# Patient Record
Sex: FEMALE | Race: BLACK OR AFRICAN AMERICAN | NOT HISPANIC OR LATINO | Employment: OTHER | URBAN - METROPOLITAN AREA
[De-identification: names, ages, dates, MRNs, and addresses within clinical notes are randomized per-mention and may not be internally consistent; named-entity substitution may affect disease eponyms.]

---

## 2019-01-09 ENCOUNTER — HOSPITAL ENCOUNTER (OUTPATIENT)
Dept: FAMILY MEDICINE CLINIC | Facility: CLINIC | Age: 69
Setting detail: SPECIMEN
Discharge: HOME | End: 2019-01-09
Attending: FAMILY MEDICINE | Admitting: FAMILY MEDICINE

## 2019-01-09 LAB
ANION GAP SERPL CALC-SCNC: 15 MMOL/L (ref 10–20)
BUN SERPL-MCNC: 8 MG/DL (ref 8–20)
BUN/CREAT SERPL: 10 (ref 5.4–26.2)
CALCIUM SERPL-MCNC: 9.6 MG/DL (ref 8.9–10.3)
CHLORIDE SERPL-SCNC: 103 MMOL/L (ref 101–111)
CONV CO2: 23 MMOL/L (ref 22–32)
CREAT UR-MCNC: 0.8 MG/DL (ref 0.4–1)
GLUCOSE SERPL-MCNC: 190 MG/DL (ref 65–99)
POTASSIUM SERPL-SCNC: 4 MMOL/L (ref 3.6–5.1)
SODIUM SERPL-SCNC: 137 MMOL/L (ref 136–144)

## 2019-01-15 ENCOUNTER — HOSPITAL ENCOUNTER (OUTPATIENT)
Dept: FAMILY MEDICINE CLINIC | Facility: CLINIC | Age: 69
Setting detail: SPECIMEN
Discharge: HOME | End: 2019-01-15
Attending: FAMILY MEDICINE | Admitting: FAMILY MEDICINE

## 2019-08-13 ENCOUNTER — TELEPHONE (OUTPATIENT)
Dept: FAMILY MEDICINE CLINIC | Facility: CLINIC | Age: 69
End: 2019-08-13

## 2019-08-13 NOTE — TELEPHONE ENCOUNTER
Call from patient's insurance nurse practitioner, patient's A1c is 6.8 from a home visit      Advised patient needs an appointment with Dr. Cisneros to discuss treatment    A1c in January was 6.4

## 2019-09-03 ENCOUNTER — LAB (OUTPATIENT)
Dept: FAMILY MEDICINE CLINIC | Facility: CLINIC | Age: 69
End: 2019-09-03

## 2019-09-03 ENCOUNTER — RESULTS ENCOUNTER (OUTPATIENT)
Dept: FAMILY MEDICINE CLINIC | Facility: CLINIC | Age: 69
End: 2019-09-03

## 2019-09-03 ENCOUNTER — APPOINTMENT (OUTPATIENT)
Dept: MAMMOGRAPHY | Facility: HOSPITAL | Age: 69
End: 2019-09-03

## 2019-09-03 ENCOUNTER — OFFICE VISIT (OUTPATIENT)
Dept: FAMILY MEDICINE CLINIC | Facility: CLINIC | Age: 69
End: 2019-09-03

## 2019-09-03 VITALS
SYSTOLIC BLOOD PRESSURE: 151 MMHG | HEART RATE: 95 BPM | HEIGHT: 63 IN | BODY MASS INDEX: 51.91 KG/M2 | OXYGEN SATURATION: 99 % | DIASTOLIC BLOOD PRESSURE: 91 MMHG | WEIGHT: 293 LBS

## 2019-09-03 DIAGNOSIS — Z12.11 SCREENING FOR COLON CANCER: ICD-10-CM

## 2019-09-03 DIAGNOSIS — R21 RASH: ICD-10-CM

## 2019-09-03 DIAGNOSIS — I10 ESSENTIAL HYPERTENSION: ICD-10-CM

## 2019-09-03 DIAGNOSIS — Z12.31 BREAST CANCER SCREENING BY MAMMOGRAM: Primary | ICD-10-CM

## 2019-09-03 DIAGNOSIS — F41.9 ANXIETY: ICD-10-CM

## 2019-09-03 DIAGNOSIS — E11.9 TYPE 2 DIABETES MELLITUS WITHOUT COMPLICATION, WITHOUT LONG-TERM CURRENT USE OF INSULIN (HCC): ICD-10-CM

## 2019-09-03 DIAGNOSIS — M25.562 ACUTE PAIN OF LEFT KNEE: ICD-10-CM

## 2019-09-03 PROBLEM — R73.02 IMPAIRED GLUCOSE TOLERANCE: Status: ACTIVE | Noted: 2019-01-16

## 2019-09-03 PROBLEM — M10.9 GOUT: Status: ACTIVE | Noted: 2019-09-03

## 2019-09-03 PROBLEM — R73.9 HYPERGLYCEMIA: Status: ACTIVE | Noted: 2019-01-12

## 2019-09-03 PROBLEM — M19.90 OSTEOARTHRITIS: Status: ACTIVE | Noted: 2019-01-09

## 2019-09-03 LAB
ALBUMIN SERPL-MCNC: 4 G/DL (ref 3.5–4.8)
ALBUMIN UR-MCNC: 19 MG/L
ALBUMIN/GLOB SERPL: 1.3 G/DL (ref 1–1.7)
ALP SERPL-CCNC: 101 U/L (ref 32–91)
ALT SERPL W P-5'-P-CCNC: 17 U/L (ref 14–54)
ANION GAP SERPL CALCULATED.3IONS-SCNC: 15.5 MMOL/L (ref 5–15)
ARTICHOKE IGE QN: 86 MG/DL (ref 0–100)
AST SERPL-CCNC: 19 U/L (ref 15–41)
BILIRUB SERPL-MCNC: 0.4 MG/DL (ref 0.3–1.2)
BUN BLD-MCNC: 8 MG/DL (ref 8–20)
BUN/CREAT SERPL: 8.9 (ref 5.4–26.2)
CALCIUM SPEC-SCNC: 9.7 MG/DL (ref 8.9–10.3)
CHLORIDE SERPL-SCNC: 104 MMOL/L (ref 101–111)
CHOLEST SERPL-MCNC: 148 MG/DL
CO2 SERPL-SCNC: 24 MMOL/L (ref 22–32)
CREAT BLD-MCNC: 0.9 MG/DL (ref 0.4–1)
GFR SERPL CREATININE-BSD FRML MDRD: 75 ML/MIN/1.73
GLOBULIN UR ELPH-MCNC: 3.1 GM/DL (ref 2.5–3.8)
GLUCOSE BLD-MCNC: 129 MG/DL (ref 65–99)
HDLC SERPL QL: 3.79
HDLC SERPL-MCNC: 39 MG/DL
LDLC/HDLC SERPL: 2.07 {RATIO}
POTASSIUM BLD-SCNC: 4.5 MMOL/L (ref 3.6–5.1)
PROT SERPL-MCNC: 7.1 G/DL (ref 6.1–7.9)
SODIUM BLD-SCNC: 139 MMOL/L (ref 136–144)
TRIGL SERPL-MCNC: 141 MG/DL
VLDLC SERPL-MCNC: 28.2 MG/DL

## 2019-09-03 PROCEDURE — 99214 OFFICE O/P EST MOD 30 MIN: CPT | Performed by: FAMILY MEDICINE

## 2019-09-03 PROCEDURE — 36415 COLL VENOUS BLD VENIPUNCTURE: CPT | Performed by: FAMILY MEDICINE

## 2019-09-03 PROCEDURE — 82043 UR ALBUMIN QUANTITATIVE: CPT | Performed by: FAMILY MEDICINE

## 2019-09-03 PROCEDURE — 80053 COMPREHEN METABOLIC PANEL: CPT | Performed by: FAMILY MEDICINE

## 2019-09-03 PROCEDURE — 80061 LIPID PANEL: CPT | Performed by: FAMILY MEDICINE

## 2019-09-03 RX ORDER — INDOMETHACIN 50 MG/1
1 CAPSULE ORAL EVERY 8 HOURS
COMMUNITY
Start: 2017-10-03 | End: 2019-09-26 | Stop reason: SDUPTHER

## 2019-09-03 RX ORDER — HYDROXYZINE HYDROCHLORIDE 25 MG/1
TABLET, FILM COATED ORAL EVERY 6 HOURS
COMMUNITY
Start: 2017-05-17 | End: 2019-09-03 | Stop reason: SDUPTHER

## 2019-09-03 RX ORDER — ALLOPURINOL 300 MG/1
TABLET ORAL EVERY 24 HOURS
COMMUNITY
Start: 2017-12-02 | End: 2019-09-26 | Stop reason: SDUPTHER

## 2019-09-03 RX ORDER — LISINOPRIL 10 MG/1
10 TABLET ORAL DAILY
Qty: 90 TABLET | Refills: 3 | Status: SHIPPED | OUTPATIENT
Start: 2019-09-03 | End: 2020-07-28

## 2019-09-03 RX ORDER — HYDROXYZINE HYDROCHLORIDE 25 MG/1
25 TABLET, FILM COATED ORAL EVERY 6 HOURS PRN
Start: 2019-09-03 | End: 2020-04-02

## 2019-09-03 RX ORDER — BUPROPION HYDROCHLORIDE 150 MG/1
150 TABLET, EXTENDED RELEASE ORAL 2 TIMES DAILY
Qty: 180 TABLET | Refills: 3 | Status: SHIPPED | OUTPATIENT
Start: 2019-09-03 | End: 2020-12-30

## 2019-09-03 NOTE — PROGRESS NOTES
Subjective   Jeanna Hudson is a 69 y.o. female.     Comes in today for follow-up after she says she had a Medicare wellness exam done at her home and some abnormalities were found  A1C went form 6.4 to 6.8  Left knee pain for a month  Makes it diff to walk  No trauma  usu wears glasses  Has not seen eye doctor in years  Rash - pruritic  No h/o seizures  Cannot take flu vaccine  Still smoking but wants to stop  anxious         The following portions of the patient's history were reviewed and updated as appropriate: allergies, current medications, past family history, past medical history, past social history, past surgical history and problem list.  Past Medical History:   Diagnosis Date   • Gout    • Sciatica      History reviewed. No pertinent surgical history.  History reviewed. No pertinent family history.  Social History     Socioeconomic History   • Marital status:      Spouse name: Not on file   • Number of children: Not on file   • Years of education: Not on file   • Highest education level: Not on file   Tobacco Use   • Smoking status: Current Every Day Smoker   • Smokeless tobacco: Never Used   Substance and Sexual Activity   • Alcohol use: No     Frequency: Never   • Drug use: No   • Sexual activity: Defer         Current Outpatient Medications:   •  allopurinol (ZYLOPRIM) 300 MG tablet, Daily., Disp: , Rfl:   •  fluocinonide (LIDEX) 0.05 % cream, Apply  topically to the appropriate area as directed Every 12 (Twelve) Hours., Disp: 15 g, Rfl: 1  •  hydrOXYzine (ATARAX) 25 MG tablet, Take 1 tablet by mouth Every 6 (Six) Hours As Needed for Itching., Disp: , Rfl:   •  indomethacin (INDOCIN) 50 MG capsule, 1 capsule Every 8 (Eight) Hours., Disp: , Rfl:   •  buPROPion SR (WELLBUTRIN SR) 150 MG 12 hr tablet, Take 1 tablet by mouth 2 (Two) Times a Day. Only take the pill once a day for the first 3 days.  Always take the 2nd dose by 5pm, Disp: 180 tablet, Rfl: 3  •  lisinopril (PRINIVIL,ZESTRIL) 10 MG  "tablet, Take 1 tablet by mouth Daily., Disp: 90 tablet, Rfl: 3  •  metFORMIN (GLUCOPHAGE) 500 MG tablet, Take 1 tablet by mouth 2 (Two) Times a Day With Meals., Disp: 180 tablet, Rfl: 3    Review of Systems   Constitutional: Negative for diaphoresis, fatigue, fever, unexpected weight gain and unexpected weight loss.   Respiratory: Negative for cough, chest tightness and shortness of breath.    Cardiovascular: Negative for chest pain, palpitations and leg swelling.   Gastrointestinal: Negative for nausea and vomiting.   Endocrine: Positive for polydipsia, polyphagia and polyuria. Negative for cold intolerance and heat intolerance.   Musculoskeletal: Positive for arthralgias. Negative for joint swelling.   Skin: Positive for rash.   Neurological: Positive for numbness. Negative for dizziness, syncope and headache.     /91 (BP Location: Left arm, Cuff Size: Large Adult)   Pulse 95   Ht 158.8 cm (62.5\")   Wt (!) 137 kg (301 lb 11.2 oz)   SpO2 99%   BMI 54.30 kg/m²       Objective   Physical Exam   Constitutional: She appears well-developed and well-nourished. No distress. She is obese.  HENT:   Head: Normocephalic and atraumatic.   Neck: Neck supple. No JVD present. No thyromegaly present.   Cardiovascular: Normal rate, regular rhythm, normal heart sounds and intact distal pulses. Exam reveals no gallop and no friction rub.   No murmur heard.  Pulmonary/Chest: Effort normal and breath sounds normal. No respiratory distress. She has no wheezes. She has no rales.   Musculoskeletal: She exhibits no edema.        Left knee: She exhibits decreased range of motion. She exhibits no swelling, no effusion, no ecchymosis, no deformity, no laceration and no erythema. Tenderness found.   Left knee: no laxity  Neg drawer sign     Lymphadenopathy:     She has no cervical adenopathy.   Neurological: She is alert.   Skin: Skin is warm and dry. Rash (few isolated papules on both forearms ) noted.   Psychiatric: Her mood " appears anxious.   Nursing note and vitals reviewed.        Assessment/Plan   Problems Addressed this Visit        Cardiovascular and Mediastinum    Hypertension    Relevant Medications    lisinopril (PRINIVIL,ZESTRIL) 10 MG tablet    Other Relevant Orders    Comprehensive Metabolic Panel (Completed)    Lipid Panel (Completed)       Endocrine    Type 2 diabetes mellitus without complication, without long-term current use of insulin (CMS/[facility])    Relevant Medications    metFORMIN (GLUCOPHAGE) 500 MG tablet    Other Relevant Orders    Comprehensive Metabolic Panel (Completed)    Lipid Panel (Completed)    MicroAlbumin, Urine, Random - Urine, Clean Catch (Completed)    Ambulatory Referral to Diabetic Education       Other    Breast cancer screening by mammogram - Primary    Relevant Orders    Mammo Screening Digital Tomosynthesis Bilateral With CAD    Screening for colon cancer    Relevant Orders    Cologuard - Stool, Per Rectum      Other Visit Diagnoses     Acute pain of left knee        send for x-ray    Relevant Orders    XR Knee 3 View Left    Rash        reassured    Relevant Medications    fluocinonide (LIDEX) 0.05 % cream    Anxiety        will start wellbutrin (generic)  this may also help her stop smoking

## 2019-09-03 NOTE — PATIENT INSTRUCTIONS
Stop smoking  Keep working to lose weight through healthy eating and exercise.  No fried foods and limit pasta, bread, and sweets.  See your eye doctor  Test your blood sugar once a day before a meal  Ok to take 2 ES tylenol 3 times a day if needed for pain

## 2019-09-10 ENCOUNTER — APPOINTMENT (OUTPATIENT)
Dept: MAMMOGRAPHY | Facility: HOSPITAL | Age: 69
End: 2019-09-10

## 2019-09-16 ENCOUNTER — TELEPHONE (OUTPATIENT)
Dept: ENDOCRINOLOGY | Facility: CLINIC | Age: 69
End: 2019-09-16

## 2019-09-16 NOTE — TELEPHONE ENCOUNTER
CALLED PATIENT TO GIVE AN APPOINTMENT REMINDER CALL FOR TOMORROW 09/17 @ NOON. NOT ABLE TO LEAVE HER ANY KIND OF A MESSAGE

## 2019-09-17 ENCOUNTER — HOSPITAL ENCOUNTER (OUTPATIENT)
Dept: GENERAL RADIOLOGY | Facility: HOSPITAL | Age: 69
Discharge: HOME | End: 2019-09-17

## 2019-09-17 ENCOUNTER — HOSPITAL ENCOUNTER (OUTPATIENT)
Dept: MAMMOGRAPHY | Facility: HOSPITAL | Age: 69
Discharge: HOME | End: 2019-09-17
Admitting: FAMILY MEDICINE

## 2019-09-17 DIAGNOSIS — M25.562 ACUTE PAIN OF LEFT KNEE: ICD-10-CM

## 2019-09-17 DIAGNOSIS — Z12.31 BREAST CANCER SCREENING BY MAMMOGRAM: ICD-10-CM

## 2019-09-17 PROCEDURE — 77063 BREAST TOMOSYNTHESIS BI: CPT

## 2019-09-17 PROCEDURE — 73562 X-RAY EXAM OF KNEE 3: CPT

## 2019-09-17 PROCEDURE — 77067 SCR MAMMO BI INCL CAD: CPT

## 2019-09-19 ENCOUNTER — TELEPHONE (OUTPATIENT)
Dept: FAMILY MEDICINE CLINIC | Facility: CLINIC | Age: 69
End: 2019-09-19

## 2019-09-19 DIAGNOSIS — M25.562 ACUTE PAIN OF LEFT KNEE: Primary | ICD-10-CM

## 2019-09-19 NOTE — TELEPHONE ENCOUNTER
PT CALLED BACK. SHE WOULD LIKE THE ORTHO REFERRAL PLEASE. ALSO, SHE IS IN TERRIBLE PAIN AND CAN'T WALK. SHE WANTS TO KNOW IF YOU CAN CALL HER IN SOMETHING.

## 2019-09-23 ENCOUNTER — TELEPHONE (OUTPATIENT)
Dept: ENDOCRINOLOGY | Facility: CLINIC | Age: 69
End: 2019-09-23

## 2019-09-24 ENCOUNTER — OFFICE VISIT (OUTPATIENT)
Dept: ENDOCRINOLOGY | Facility: CLINIC | Age: 69
End: 2019-09-24

## 2019-09-24 DIAGNOSIS — E11.9 TYPE 2 DIABETES MELLITUS WITHOUT COMPLICATION, WITHOUT LONG-TERM CURRENT USE OF INSULIN (HCC): ICD-10-CM

## 2019-09-24 PROCEDURE — G0108 DIAB MANAGE TRN  PER INDIV: HCPCS | Performed by: DIETITIAN, REGISTERED

## 2019-09-24 RX ORDER — INDOMETHACIN 50 MG/1
CAPSULE ORAL
Qty: 270 CAPSULE | OUTPATIENT
Start: 2019-09-24

## 2019-09-24 RX ORDER — ALLOPURINOL 300 MG/1
TABLET ORAL
Qty: 90 TABLET | OUTPATIENT
Start: 2019-09-24

## 2019-09-26 RX ORDER — INDOMETHACIN 50 MG/1
50 CAPSULE ORAL EVERY 8 HOURS
Qty: 270 CAPSULE | Refills: 0 | Status: SHIPPED | OUTPATIENT
Start: 2019-09-26 | End: 2020-11-27

## 2019-09-26 RX ORDER — ALLOPURINOL 300 MG/1
300 TABLET ORAL DAILY
Qty: 90 TABLET | Refills: 0 | Status: SHIPPED | OUTPATIENT
Start: 2019-09-26 | End: 2020-01-26

## 2019-10-10 ENCOUNTER — OFFICE VISIT (OUTPATIENT)
Dept: ORTHOPEDIC SURGERY | Facility: CLINIC | Age: 69
End: 2019-10-10

## 2019-10-10 VITALS
HEIGHT: 55 IN | BODY MASS INDEX: 67.81 KG/M2 | WEIGHT: 293 LBS | HEART RATE: 125 BPM | DIASTOLIC BLOOD PRESSURE: 79 MMHG | SYSTOLIC BLOOD PRESSURE: 149 MMHG

## 2019-10-10 DIAGNOSIS — M17.12 PRIMARY LOCALIZED OSTEOARTHROSIS OF THE KNEE, LEFT: Primary | ICD-10-CM

## 2019-10-10 PROCEDURE — 99203 OFFICE O/P NEW LOW 30 MIN: CPT | Performed by: ORTHOPAEDIC SURGERY

## 2019-10-10 PROCEDURE — 20610 DRAIN/INJ JOINT/BURSA W/O US: CPT | Performed by: ORTHOPAEDIC SURGERY

## 2019-10-10 RX ORDER — MELOXICAM 7.5 MG/1
7.5 TABLET ORAL DAILY PRN
Qty: 30 TABLET | Refills: 4 | Status: SHIPPED | OUTPATIENT
Start: 2019-10-10 | End: 2019-11-01

## 2019-10-10 RX ORDER — TRIAMCINOLONE ACETONIDE 40 MG/ML
80 INJECTION, SUSPENSION INTRA-ARTICULAR; INTRAMUSCULAR ONCE
Status: COMPLETED | OUTPATIENT
Start: 2019-10-10 | End: 2019-10-10

## 2019-10-10 RX ADMIN — TRIAMCINOLONE ACETONIDE 80 MG: 40 INJECTION, SUSPENSION INTRA-ARTICULAR; INTRAMUSCULAR at 13:35

## 2019-10-10 NOTE — PROGRESS NOTES
"     Patient ID: Jeanna Hudson is a 69 y.o. female.    Chief Complaint:    Chief Complaint   Patient presents with   • Consult     left knee pain       HPI:  Jeanna is a 69-year-old female here with several months of left knee pain.  There is no injury.  It is worse when walking.  It pops with bending.  Pain is sharp and a 6/10.  There is been no treatment to date.  Past Medical History:   Diagnosis Date   • Gout    • Sciatica        History reviewed. No pertinent surgical history.    History reviewed. No pertinent family history.       Social History     Occupational History   • Not on file   Tobacco Use   • Smoking status: Current Every Day Smoker   • Smokeless tobacco: Never Used   Substance and Sexual Activity   • Alcohol use: No     Frequency: Never   • Drug use: No   • Sexual activity: Defer      Review of Systems   Cardiovascular: Negative for chest pain.   Musculoskeletal: Positive for arthralgias.       Objective:    /79   Pulse (!) 125   Ht 62.5 cm (24.61\")   Wt 135 kg (297 lb)   .89 kg/m²     Physical Examination:  She is a pleasant female in no distress. She is alert and oriented x3 and appears her stated age.  Left knee demonstrates no scars and no atrophy with mild effusion with medial and lateral joint line tenderness.  Range of motion is 0 to 110 degrees without instability.Sensory and motor exam are intact all distributions. Dorsalis pedis and posterior tibialis pulses are palpable and capillary refill is less than two seconds to all digits    Imaging:  Moderate to severe degenerative joint disease    Assessment:  Left knee degenerative joint disease    Plan: Options discussed.  I wrote for meloxicam.I recommend injection after today's eval.  Risks and benefits were discussed. Under sterile technique and written consent I injected 80mg of Kenalog and 2cc of 1% Lidocaine plain into the knee. It was well tolerated. Postinjection instructions were given.  "

## 2019-11-01 RX ORDER — MELOXICAM 7.5 MG/1
7.5 TABLET ORAL DAILY PRN
Qty: 90 TABLET | Refills: 4 | Status: SHIPPED | OUTPATIENT
Start: 2019-11-01 | End: 2021-01-01 | Stop reason: HOSPADM

## 2019-12-13 ENCOUNTER — TELEPHONE (OUTPATIENT)
Dept: ORTHOPEDIC SURGERY | Facility: CLINIC | Age: 69
End: 2019-12-13

## 2019-12-16 ENCOUNTER — TELEPHONE (OUTPATIENT)
Dept: ORTHOPEDIC SURGERY | Facility: CLINIC | Age: 69
End: 2019-12-16

## 2019-12-16 NOTE — TELEPHONE ENCOUNTER
She will not be able to get visco inj until the beginning of the year, she is having quite a bit of pain, can we do a compound cream to help get her by until she can get the injections?

## 2019-12-16 NOTE — TELEPHONE ENCOUNTER
Called patient went over SOB wants, to proceed with injs. Will order orthovisc. Transferred to make an appointment.

## 2020-01-03 ENCOUNTER — TELEPHONE (OUTPATIENT)
Dept: ORTHOPEDIC SURGERY | Facility: CLINIC | Age: 70
End: 2020-01-03

## 2020-01-10 NOTE — TELEPHONE ENCOUNTER
orthovisc  Was denied, it is not a preferred product by [facility], submitting for euflexxa.

## 2020-01-12 RX ORDER — INDOMETHACIN 50 MG/1
CAPSULE ORAL
Qty: 270 CAPSULE | Refills: 0 | OUTPATIENT
Start: 2020-01-12

## 2020-01-14 ENCOUNTER — TELEPHONE (OUTPATIENT)
Dept: ORTHOPEDIC SURGERY | Facility: CLINIC | Age: 70
End: 2020-01-14

## 2020-01-14 NOTE — TELEPHONE ENCOUNTER
No having any luck with getting visco covered through her insurance, she states that the regular steroid inj gave her about 2 months of relief, and is really wanting some type of injection.  will run for zilretta to see if insurance will cover that. Her last steroid inj, was 10/10/19.

## 2020-01-21 NOTE — TELEPHONE ENCOUNTER
Zilretta covered at 80%, no PA required. Will have office co pay.    Spoke with patient an she would like to proceed. Was transferred up front to make an appointment.

## 2020-01-26 RX ORDER — ALLOPURINOL 300 MG/1
300 TABLET ORAL DAILY
Qty: 90 TABLET | Refills: 1 | Status: SHIPPED | OUTPATIENT
Start: 2020-01-26 | End: 2020-07-28

## 2020-02-06 ENCOUNTER — OFFICE VISIT (OUTPATIENT)
Dept: ORTHOPEDIC SURGERY | Facility: CLINIC | Age: 70
End: 2020-02-06

## 2020-02-06 VITALS
BODY MASS INDEX: 50.02 KG/M2 | HEIGHT: 64 IN | HEART RATE: 118 BPM | SYSTOLIC BLOOD PRESSURE: 158 MMHG | WEIGHT: 293 LBS | DIASTOLIC BLOOD PRESSURE: 77 MMHG

## 2020-02-06 DIAGNOSIS — M17.12 PRIMARY OSTEOARTHRITIS OF LEFT KNEE: Primary | ICD-10-CM

## 2020-02-06 PROCEDURE — 20610 DRAIN/INJ JOINT/BURSA W/O US: CPT | Performed by: ORTHOPAEDIC SURGERY

## 2020-02-06 NOTE — PROGRESS NOTES
"     Patient ID: Jeanan Hudson is a 69 y.o. female.    Left knee pain  Here for Zilretta    Objective:    /77   Pulse 118   Ht 161.3 cm (63.5\")   Wt 135 kg (297 lb)   BMI 51.79 kg/m²     Physical Examination:    Left knee demonstrates no redness mild effusion    Imaging:      Assessment:  Left knee degenerative joint disease    Plan:  Large Joint Arthrocentesis: L knee  Date/Time: 2/6/2020 1:19 PM  Procedure Details  Location: knee - L knee  Needle size: 22 G  Medications administered: 32 mg Triamcinolone Acetonide 32 MG  Patient tolerance: patient tolerated the procedure well with no immediate complications        "

## 2020-04-02 ENCOUNTER — OFFICE VISIT (OUTPATIENT)
Dept: FAMILY MEDICINE CLINIC | Facility: CLINIC | Age: 70
End: 2020-04-02

## 2020-04-02 VITALS
OXYGEN SATURATION: 99 % | TEMPERATURE: 97.5 F | DIASTOLIC BLOOD PRESSURE: 88 MMHG | BODY MASS INDEX: 50.02 KG/M2 | HEART RATE: 99 BPM | WEIGHT: 293 LBS | SYSTOLIC BLOOD PRESSURE: 159 MMHG | HEIGHT: 64 IN

## 2020-04-02 DIAGNOSIS — I10 ESSENTIAL HYPERTENSION: Primary | ICD-10-CM

## 2020-04-02 DIAGNOSIS — E11.9 TYPE 2 DIABETES MELLITUS WITHOUT COMPLICATION, WITHOUT LONG-TERM CURRENT USE OF INSULIN (HCC): ICD-10-CM

## 2020-04-02 DIAGNOSIS — F41.1 GENERALIZED ANXIETY DISORDER: ICD-10-CM

## 2020-04-02 LAB
ALBUMIN SERPL-MCNC: 4.3 G/DL (ref 3.5–5.2)
ALBUMIN/GLOB SERPL: 1.2 G/DL
ALP SERPL-CCNC: 103 U/L (ref 39–117)
ALT SERPL W P-5'-P-CCNC: 11 U/L (ref 1–33)
ANION GAP SERPL CALCULATED.3IONS-SCNC: 14 MMOL/L (ref 5–15)
AST SERPL-CCNC: 13 U/L (ref 1–32)
BILIRUB SERPL-MCNC: 0.2 MG/DL (ref 0.2–1.2)
BUN BLD-MCNC: 10 MG/DL (ref 8–23)
BUN/CREAT SERPL: 11.8 (ref 7–25)
CALCIUM SPEC-SCNC: 9.8 MG/DL (ref 8.6–10.5)
CHLORIDE SERPL-SCNC: 101 MMOL/L (ref 98–107)
CO2 SERPL-SCNC: 23 MMOL/L (ref 22–29)
CREAT BLD-MCNC: 0.85 MG/DL (ref 0.57–1)
GFR SERPL CREATININE-BSD FRML MDRD: 81 ML/MIN/1.73
GLOBULIN UR ELPH-MCNC: 3.7 GM/DL
GLUCOSE BLD-MCNC: 132 MG/DL (ref 65–99)
POTASSIUM BLD-SCNC: 4.5 MMOL/L (ref 3.5–5.2)
PROT SERPL-MCNC: 8 G/DL (ref 6–8.5)
SODIUM BLD-SCNC: 138 MMOL/L (ref 136–145)

## 2020-04-02 PROCEDURE — 36415 COLL VENOUS BLD VENIPUNCTURE: CPT | Performed by: FAMILY MEDICINE

## 2020-04-02 PROCEDURE — 83036 HEMOGLOBIN GLYCOSYLATED A1C: CPT | Performed by: FAMILY MEDICINE

## 2020-04-02 PROCEDURE — 80053 COMPREHEN METABOLIC PANEL: CPT | Performed by: FAMILY MEDICINE

## 2020-04-02 PROCEDURE — 99213 OFFICE O/P EST LOW 20 MIN: CPT | Performed by: FAMILY MEDICINE

## 2020-04-02 NOTE — PATIENT INSTRUCTIONS
Keep working to lose weight through healthy eating and exercise.  See your eye doctor  No fried foods and limit pasta, bread, and sweets.

## 2020-04-02 NOTE — PROGRESS NOTES
Eleno Hudson is a 69 y.o. female.     Here for follow-up on blood pressure and diabetes and anxiety  Does not check BS   Needs eye appt  Feels well  Has cologuard kit at home but has not done it       The following portions of the patient's history were reviewed and updated as appropriate: allergies, current medications, past family history, past medical history, past social history, past surgical history and problem list.  Past Medical History:   Diagnosis Date   • Gout    • Sciatica      History reviewed. No pertinent surgical history.  History reviewed. No pertinent family history.  Social History     Socioeconomic History   • Marital status:      Spouse name: Not on file   • Number of children: Not on file   • Years of education: Not on file   • Highest education level: Not on file   Tobacco Use   • Smoking status: Current Every Day Smoker     Packs/day: 0.25     Types: Cigarettes   • Smokeless tobacco: Never Used   • Tobacco comment: cigs/day trying to quit   Substance and Sexual Activity   • Alcohol use: No     Frequency: Never   • Drug use: No   • Sexual activity: Defer         Current Outpatient Medications:   •  allopurinol (ZYLOPRIM) 300 MG tablet, TAKE 1 TABLET BY MOUTH  DAILY, Disp: 90 tablet, Rfl: 1  •  buPROPion SR (WELLBUTRIN SR) 150 MG 12 hr tablet, Take 1 tablet by mouth 2 (Two) Times a Day. Only take the pill once a day for the first 3 days.  Always take the 2nd dose by 5pm, Disp: 180 tablet, Rfl: 3  •  fluocinonide (LIDEX) 0.05 % cream, APPLY TOPICALLY TO THE  APPROPRIATE AREA AS  DIRECTED EVERY 12 HOURS., Disp: 30 g, Rfl: 0  •  indomethacin (INDOCIN) 50 MG capsule, Take 1 capsule by mouth Every 8 (Eight) Hours., Disp: 270 capsule, Rfl: 0  •  lisinopril (PRINIVIL,ZESTRIL) 10 MG tablet, Take 1 tablet by mouth Daily., Disp: 90 tablet, Rfl: 3  •  metFORMIN (GLUCOPHAGE) 500 MG tablet, Take 1 tablet by mouth 2 (Two) Times a Day With Meals., Disp: 180 tablet, Rfl: 3  •   "meloxicam (MOBIC) 7.5 MG tablet, Take 1 tablet by mouth Daily As Needed for Moderate Pain . 90 day supply, Disp: 90 tablet, Rfl: 4    Review of Systems   Constitutional: Negative for diaphoresis, fatigue, fever, unexpected weight gain and unexpected weight loss.   Respiratory: Negative for cough, chest tightness and shortness of breath.    Cardiovascular: Negative for chest pain, palpitations and leg swelling.   Gastrointestinal: Negative for nausea and vomiting.   Endocrine: Negative.    Neurological: Positive for numbness. Negative for dizziness, syncope and headache.     /88 (BP Location: Left arm, Patient Position: Sitting, Cuff Size: Large Adult)   Pulse 99   Temp 97.5 °F (36.4 °C) (Oral)   Ht 161.3 cm (63.5\")   Wt 135 kg (297 lb)   SpO2 99%   Breastfeeding No   BMI 51.79 kg/m²       Objective   Physical Exam   Constitutional: She appears well-developed and well-nourished. No distress.   HENT:   Head: Normocephalic and atraumatic.   Neck: Neck supple. No JVD present. No thyromegaly present.   Cardiovascular: Normal rate, regular rhythm, normal heart sounds and intact distal pulses. Exam reveals no gallop and no friction rub.   No murmur heard.  Pulmonary/Chest: Effort normal and breath sounds normal. No respiratory distress. She has no wheezes. She has no rales.   Musculoskeletal: She exhibits no edema.    [place] had a diabetic foot exam performed today.   During the foot exam she had a monofilament test not performed.  Vascular Status -  Her right foot exhibits normal foot vasculature  and no edema. Her left foot exhibits normal foot vasculature  and no edema.  Skin Integrity  -  Her right foot skin is intact. She has right foot onychomycosis and callous right foot.  She has no right foot ulcer, no ingrown toenail on right foot, right heel is not dry and cracked, no right foot warmth, no right foot blister and no right foot gangrenous changes.Her left foot skin is intact.She has left foot " onychomycosis and callous left foot. She has no left foot ulcer, no left ingrown toenail, no left heel dry and cracked, no left foot warmth, no left foot blister and no left foot gangrenous changes..   Foot Structure and Biomechanics -  Her right foot has no hallux valgus, no pes cavus, no claw toes and no hammer toes present. Her left foot has no hallux valgus, no pes cavus, no claw toes and no hammer toes.  Lymphadenopathy:     She has no cervical adenopathy.   Neurological: She is alert.   Skin: Skin is warm and dry.   Psychiatric: She has a normal mood and affect.   Nursing note and vitals reviewed.        Assessment/Plan   Problems Addressed this Visit        Cardiovascular and Mediastinum    Hypertension - Primary    Relevant Orders    Comprehensive Metabolic Panel       Endocrine    Type 2 diabetes mellitus without complication, without long-term current use of insulin (CMS/[facility])    Relevant Orders    Comprehensive Metabolic Panel    Hemoglobin A1c       Other    Generalized anxiety disorder        She was counseled on need for improved diet, exercise, weight loss and routine eye appt  She will continue current meds  Will see her back in 6 mo for follow up and medicare wellness  Anxiety is staying well controlled

## 2020-04-03 LAB — HBA1C MFR BLD: 5.5 % (ref 3.5–5.6)

## 2020-05-26 ENCOUNTER — TELEPHONE (OUTPATIENT)
Dept: FAMILY MEDICINE CLINIC | Facility: CLINIC | Age: 70
End: 2020-05-26

## 2020-07-28 RX ORDER — LISINOPRIL 10 MG/1
10 TABLET ORAL DAILY
Qty: 90 TABLET | Refills: 2 | Status: SHIPPED | OUTPATIENT
Start: 2020-07-28 | End: 2020-12-15

## 2020-07-28 RX ORDER — ALLOPURINOL 300 MG/1
300 TABLET ORAL DAILY
Qty: 90 TABLET | Refills: 2 | Status: SHIPPED | OUTPATIENT
Start: 2020-07-28 | End: 2021-02-19

## 2020-10-02 ENCOUNTER — TELEPHONE (OUTPATIENT)
Dept: FAMILY MEDICINE CLINIC | Facility: CLINIC | Age: 70
End: 2020-10-02

## 2020-10-02 NOTE — TELEPHONE ENCOUNTER
No - it is a virus and requires symptomatic care: tylenol, advil, mucinex dm  If she is having trouble breathing, she needs to be seen at the [facility] or the ER

## 2020-10-19 ENCOUNTER — OFFICE VISIT (OUTPATIENT)
Dept: FAMILY MEDICINE CLINIC | Facility: CLINIC | Age: 70
End: 2020-10-19

## 2020-10-19 VITALS
HEIGHT: 64 IN | DIASTOLIC BLOOD PRESSURE: 76 MMHG | HEART RATE: 116 BPM | TEMPERATURE: 96.9 F | BODY MASS INDEX: 49.68 KG/M2 | SYSTOLIC BLOOD PRESSURE: 124 MMHG | OXYGEN SATURATION: 100 % | WEIGHT: 291 LBS

## 2020-10-19 DIAGNOSIS — E11.9 TYPE 2 DIABETES MELLITUS WITHOUT COMPLICATION, WITHOUT LONG-TERM CURRENT USE OF INSULIN (HCC): ICD-10-CM

## 2020-10-19 DIAGNOSIS — Z11.59 NEED FOR HEPATITIS C SCREENING TEST: ICD-10-CM

## 2020-10-19 DIAGNOSIS — I10 ESSENTIAL HYPERTENSION: ICD-10-CM

## 2020-10-19 DIAGNOSIS — Z00.00 ENCOUNTER FOR GENERAL ADULT MEDICAL EXAMINATION WITHOUT ABNORMAL FINDINGS: Primary | ICD-10-CM

## 2020-10-19 PROCEDURE — G0439 PPPS, SUBSEQ VISIT: HCPCS | Performed by: FAMILY MEDICINE

## 2020-10-19 PROCEDURE — 99213 OFFICE O/P EST LOW 20 MIN: CPT | Performed by: FAMILY MEDICINE

## 2020-10-19 NOTE — PATIENT INSTRUCTIONS
Keep working to lose weight through healthy eating and exercise.  Keep working to stop smoking  No fried foods and limit pasta, bread, and sweets.  SEE YOUR EYE DOCTOR SOON    Advance Directive    Advance directives are legal documents that let you make choices ahead of time about your health care and medical treatment in case you become unable to communicate for yourself. Advance directives are a way for you to make known your wishes to family, friends, and health care providers. This can let others know about your end-of-life care if you become unable to communicate.  Discussing and writing advance directives should happen over time rather than all at once. Advance directives can be changed depending on your situation and what you want, even after you have signed the advance directives.  There are different types of advance directives, such as:  · Medical power of .  · Living will.  · Do not resuscitate (DNR) or do not attempt resuscitation (DNAR) order.  Health care proxy and medical power of   A health care proxy is also called a health care agent. This is a person who is appointed to make medical decisions for you in cases where you are unable to make the decisions yourself. Generally, people choose someone they know well and trust to represent their preferences. Make sure to ask this person for an agreement to act as your proxy. A proxy may have to exercise judgment in the event of a medical decision for which your wishes are not known.  A medical power of  is a legal document that names your health care proxy. Depending on the laws in your state, after the document is written, it may also need to be:  · Signed.  · Notarized.  · Dated.  · Copied.  · Witnessed.  · Incorporated into your medical record.  You may also want to appoint someone to manage your money in a situation in which you are unable to do so. This is called a durable power of  for finances. It is a separate legal  document from the durable power of  for health care. You may choose the same person or someone different from your health care proxy to act as your agent in money matters.  If you do not appoint a proxy, or if there is a concern that the proxy is not acting in your best interests, a court may appoint a guardian to act on your behalf.  Living will  A living will is a set of instructions that state your wishes about medical care when you cannot express them yourself. Health care providers should keep a copy of your living will in your medical record. You may want to give a copy to family members or friends. To alert caregivers in case of an emergency, you can place a card in your wallet to let them know that you have a living will and where they can find it. A living will is used if you become:  · Terminally ill.  · Disabled.  · Unable to communicate or make decisions.  Items to consider in your living will include:  · To use or not to use life-support equipment, such as dialysis machines and breathing machines (ventilators).  · A DNR or DNAR order. This tells health care providers not to use cardiopulmonary resuscitation (CPR) if breathing or heartbeat stops.  · To use or not to use tube feeding.  · To be given or not to be given food and fluids.  · Comfort (palliative) care when the goal becomes comfort rather than a cure.  · Donation of organs and tissues.  A living will does not give instructions for distributing your money and property if you should pass away.  DNR or DNAR  A DNR or DNAR order is a request not to have CPR in the event that your heart stops beating or you stop breathing. If a DNR or DNAR order has not been made and shared, a health care provider will try to help any patient whose heart has stopped or who has stopped breathing. If you plan to have surgery, talk with your health care provider about how your DNR or DNAR order will be followed if problems occur.  What if I do not have an  advance directive?  If you do not have an advance directive, some states assign family decision makers to act on your behalf based on how closely you are related to them. Each state has its own laws about advance directives. You may want to check with your health care provider, , or state representative about the laws in your state.  Summary  · Advance directives are the legal documents that allow you to make choices ahead of time about your health care and medical treatment in case you become unable to tell others about your care.  · The process of discussing and writing advance directives should happen over time. You can change the advance directives, even after you have signed them.  · Advance directives include DNR or DNAR orders, living fletcher, and designating an agent as your medical power of .  This information is not intended to replace advice given to you by your health care provider. Make sure you discuss any questions you have with your health care provider.  Document Released: 03/26/2009 Document Revised: 07/16/2020 Document Reviewed: 07/16/2020  [employer]vier Patient Education © 2020 [employer]

## 2020-10-19 NOTE — PROGRESS NOTES
The ABCs of the Annual Wellness Visit  Subsequent Medicare Wellness Visit    Chief Complaint   Patient presents with   • Medicare Wellness-subsequent   • Earache     finished antibx    • Diabetes     wants to stop metformin       Subjective   History of Present Illness:  Jeanna Hudson is a 70 y.o. female who presents for a Subsequent Medicare Wellness Visit.  Also needs f/u on dm and htn  Recently on antibiotics for her ear and her teeth and has finished these  Wants to stop her metformin  She is concerned about the recall even though she is on the immediate release  Was tested for covid at the [facility] by Bernard  After she was exposed  Worked in a  but quit  Pain in right ear  She is awaiting word from dentist  More than a year since her last eye exam      HEALTH RISK ASSESSMENT    Recent Hospitalizations:  No hospitalization(s) within the last year.    Current Medical Providers:  Patient Care Team:  Rene Hawk MD as PCP - General    Smoking Status:  Social History     Tobacco Use   Smoking Status Current Every Day Smoker   • Packs/day: 0.25   • Types: Cigarettes   Smokeless Tobacco Never Used   Tobacco Comment    cigs/day trying to quit 3-4 cigs       Alcohol Consumption:  Social History     Substance and Sexual Activity   Alcohol Use No   • Frequency: Never       Depression Screen:   PHQ-2/PHQ-9 Depression Screening 10/19/2020   Little interest or pleasure in doing things 0   Feeling down, depressed, or hopeless 0   Total Score 0       Fall Risk Screen:  MIKEY Fall Risk Assessment was completed, and patient is at LOW risk for falls.Assessment completed on:4/2/2020    Health Habits and Functional and Cognitive Screening:  Functional & Cognitive Status 10/19/2020   Do you have difficulty preparing food and eating? No   Do you have difficulty bathing yourself, getting dressed or grooming yourself? No   Do you have difficulty using the toilet? No   Do you have difficulty moving around from place  to place? No   Do you have trouble with steps or getting out of a bed or a chair? No   Current Diet Limited Junk Food   Dental Exam Up to date   Eye Exam Not up to date   Exercise (times per week) 0 times per week   Current Exercise Activities Include None   Do you need help using the phone?  No   Are you deaf or do you have serious difficulty hearing?  No   Do you need help with transportation? No   Do you need help shopping? No   Do you need help preparing meals?  No   Do you need help with housework?  No   Do you need help with laundry? No   Do you need help taking your medications? No   Do you need help managing money? No   Do you ever drive or ride in a car without wearing a seat belt? No   Have you felt unusual stress, anger or loneliness in the last month? No   Who do you live with? Child   If you need help, do you have trouble finding someone available to you? No   Have you been bothered in the last four weeks by sexual problems? No   Do you have difficulty concentrating, remembering or making decisions? No         Does the patient have evidence of cognitive impairment? No   mmse done 28/30    Asprin use counseling:Does not need ASA (and currently is not on it)    Age-appropriate Screening Schedule:  Refer to the list below for future screening recommendations based on patient's age, sex and/or medical conditions. Orders for these recommended tests are listed in the plan section. The patient has been provided with a written plan.    Health Maintenance   Topic Date Due   • DIABETIC EYE EXAM  08/13/2019   • URINE MICROALBUMIN  09/03/2020   • HEMOGLOBIN A1C  10/02/2020   • COLONOSCOPY  10/01/2021 (Originally 1950)   • TDAP/TD VACCINES (1 - Tdap) 10/01/2021 (Originally 7/31/1969)   • ZOSTER VACCINE (1 of 2) 10/01/2021 (Originally 7/31/2001)   • DIABETIC FOOT EXAM  04/02/2021   • MAMMOGRAM  09/17/2021   • INFLUENZA VACCINE  Discontinued          The following portions of the patient's history were reviewed  and updated as appropriate: allergies, current medications, past family history, past medical history, past social history, past surgical history and problem list.    Outpatient Medications Prior to Visit   Medication Sig Dispense Refill   • allopurinol (ZYLOPRIM) 300 MG tablet TAKE 1 TABLET BY MOUTH  DAILY 90 tablet 2   • buPROPion SR (WELLBUTRIN SR) 150 MG 12 hr tablet Take 1 tablet by mouth 2 (Two) Times a Day. Only take the pill once a day for the first 3 days.  Always take the 2nd dose by 5pm 180 tablet 3   • fluocinonide (LIDEX) 0.05 % cream APPLY TOPICALLY TO THE  APPROPRIATE AREA AS  DIRECTED EVERY 12 HOURS. 30 g 0   • lisinopril (PRINIVIL,ZESTRIL) 10 MG tablet TAKE 1 TABLET BY MOUTH  DAILY 90 tablet 2   • meloxicam (MOBIC) 7.5 MG tablet Take 1 tablet by mouth Daily As Needed for Moderate Pain . 90 day supply 90 tablet 4   • metFORMIN (GLUCOPHAGE) 500 MG tablet TAKE 1 TABLET BY MOUTH TWO  TIMES DAILY WITH MEALS 180 tablet 2   • indomethacin (INDOCIN) 50 MG capsule Take 1 capsule by mouth Every 8 (Eight) Hours. 270 capsule 0     No facility-administered medications prior to visit.        Patient Active Problem List   Diagnosis   • Encounter for general adult medical examination without abnormal findings   • Gout   • Hypertension   • Lumbar radiculopathy   • Osteoarthritis   • Encounter for screening for malignant neoplasm of colon   • Spondylolisthesis, acquired   • Type 2 diabetes mellitus without complication, without long-term current use of insulin (CMS/[facility])   • Breast cancer screening by mammogram   • Screening for colon cancer   • Generalized anxiety disorder       Advanced Care Planning:  ACP discussion was held with the patient during this visit. Patient does not have an advance directive, information provided.    Review of Systems   Constitutional: Negative.    HENT: Positive for ear pain.    Respiratory: Positive for shortness of breath (occ). Negative for cough, chest tightness, wheezing and  "stridor.    Cardiovascular: Negative.    Gastrointestinal: Negative for nausea and vomiting.   Endocrine: Negative.    Neurological: Negative for dizziness, syncope, light-headedness and headaches.   Psychiatric/Behavioral: Negative.        Compared to one year ago, the patient feels her physical health is better.  Compared to one year ago, the patient feels her mental health is better.    Reviewed chart for potential of high risk medication in the elderly: yes  Reviewed chart for potential of harmful drug interactions in the elderly:yes    Objective         Vitals:    10/19/20 1226   BP: 124/76   BP Location: Left arm   Patient Position: Sitting   Cuff Size: Large Adult   Pulse: 116   Temp: 96.9 °F (36.1 °C)   TempSrc: Temporal   SpO2: 100%   Weight: 132 kg (291 lb)   Height: 161.3 cm (63.5\")       Body mass index is 50.74 kg/m².  Discussed the patient's BMI with her. The BMI is above average; BMI management plan is completed.    Physical Exam  Vitals signs and nursing note reviewed.   Constitutional:       General: She is not in acute distress.     Appearance: Normal appearance.   HENT:      Head: Normocephalic and atraumatic.      Right Ear: Tympanic membrane, ear canal and external ear normal.      Left Ear: Tympanic membrane, ear canal and external ear normal.   Cardiovascular:      Rate and Rhythm: Normal rate and regular rhythm.      Heart sounds: Normal heart sounds.   Pulmonary:      Effort: Pulmonary effort is normal. No respiratory distress.      Breath sounds: Normal breath sounds.   Musculoskeletal:      Right lower leg: No edema.      Left lower leg: No edema.   Skin:     General: Skin is warm and dry.      Findings: No rash.   Neurological:      Mental Status: She is alert and oriented to person, place, and time.   Psychiatric:         Mood and Affect: Mood normal.               Assessment/Plan   Medicare Risks and Personalized Health Plan  CMS Preventative Services Quick Reference  Obesity/Overweight "     The above risks/problems have been discussed with the patient.  Pertinent information has been shared with the patient in the After Visit Summary.  Follow up plans and orders are seen below in the Assessment/Plan Section.    Diagnoses and all orders for this visit:    1. Encounter for general adult medical examination without abnormal findings (Primary)    2. Type 2 diabetes mellitus without complication, without long-term current use of insulin (CMS/[facility])  -     Comprehensive Metabolic Panel  -     Hemoglobin A1c  -     MicroAlbumin, Urine, Random - Urine, Clean Catch; Future    3. Essential hypertension  -     Comprehensive Metabolic Panel    4. Need for hepatitis C screening test  -     Hepatitis C Antibody; Future      Follow Up:  Return in about 6 months (around 4/19/2021) for Recheck.     An After Visit Summary and PPPS were given to the patient.       She was counseled on the need for weight loss and smoking cessation  She was encouraged to see her eye doctor  She was counseled on dietary compliance  She decided not to go off her Metformin when she found out that it was not the immediate release that was recalled  She refuses all vaccines and colonoscopy  Her last mammogram was in 2019  Labs were ordered  I will see her back in 6 months  I feel her ear pain is sec to her poor dentition and encouraged her to see her dentist as soon as possible

## 2020-11-27 ENCOUNTER — TELEPHONE (OUTPATIENT)
Dept: FAMILY MEDICINE CLINIC | Facility: CLINIC | Age: 70
End: 2020-11-27

## 2020-11-27 NOTE — TELEPHONE ENCOUNTER
Pt states having increasing SOB since this am; some better w/ coffee and rescue HFA but still having issues; Pt w/ hx/o Tob abuse and agreeable to go to  for full eval

## 2020-12-15 ENCOUNTER — OFFICE VISIT (OUTPATIENT)
Dept: FAMILY MEDICINE CLINIC | Facility: CLINIC | Age: 70
End: 2020-12-15

## 2020-12-15 ENCOUNTER — LAB (OUTPATIENT)
Dept: FAMILY MEDICINE CLINIC | Facility: CLINIC | Age: 70
End: 2020-12-15

## 2020-12-15 VITALS
WEIGHT: 287 LBS | TEMPERATURE: 96.9 F | HEART RATE: 120 BPM | OXYGEN SATURATION: 99 % | SYSTOLIC BLOOD PRESSURE: 145 MMHG | BODY MASS INDEX: 50.84 KG/M2 | DIASTOLIC BLOOD PRESSURE: 94 MMHG

## 2020-12-15 DIAGNOSIS — R06.02 SHORTNESS OF BREATH: Primary | ICD-10-CM

## 2020-12-15 DIAGNOSIS — R21 RASH: ICD-10-CM

## 2020-12-15 DIAGNOSIS — R06.02 SHORTNESS OF BREATH: ICD-10-CM

## 2020-12-15 PROCEDURE — 99214 OFFICE O/P EST MOD 30 MIN: CPT | Performed by: NURSE PRACTITIONER

## 2020-12-15 PROCEDURE — 85025 COMPLETE CBC W/AUTO DIFF WBC: CPT | Performed by: NURSE PRACTITIONER

## 2020-12-15 PROCEDURE — 36415 COLL VENOUS BLD VENIPUNCTURE: CPT

## 2020-12-15 PROCEDURE — 80048 BASIC METABOLIC PNL TOTAL CA: CPT | Performed by: NURSE PRACTITIONER

## 2020-12-15 RX ORDER — BUDESONIDE AND FORMOTEROL FUMARATE DIHYDRATE 80; 4.5 UG/1; UG/1
2 AEROSOL RESPIRATORY (INHALATION)
Qty: 10.2 G | Refills: 12 | Status: SHIPPED | OUTPATIENT
Start: 2020-12-15 | End: 2022-01-24 | Stop reason: SDUPTHER

## 2020-12-15 RX ORDER — CLOTRIMAZOLE 1 %
CREAM (GRAM) TOPICAL 2 TIMES DAILY
Qty: 24 G | Refills: 0 | Status: SHIPPED | OUTPATIENT
Start: 2020-12-15 | End: 2021-02-19

## 2020-12-15 NOTE — PROGRESS NOTES
Subjective   Jeanna Hudson is a 70 y.o. female.     Patient is here today to follow-up on right lung pneumonia.  She reports that she went to the urgent care center on November 27 with shortness of breath.  She had a chest x-ray completed which showed pneumonia in the right lung and pulmonary edema.  She was sent home on prednisone and doxycycline.  Pt reports that her symptoms improved with the medication.  She states that on Saturday she started feeling some tightness in her chest.  She is not coughing.  She is having some shortness of air.    Tightness is in the epigastric area.  Denies chest pain.  Does not feel like it is her heart.  She was a smoker until 2-3 weeks ago.  She has never been diagnosed with COPD.  She has albuterol.  She has been using it twice a day  It helps to relieve her symptoms.   Denies fever or chills.  She has been having some wheezing at night.  Denies swelling in the legs.  Has had a negative covid test.    Pt reports that she has a rash in the groin.  She states it has been there a week.  It itches occasionally.        The following portions of the patient's history were reviewed and updated as appropriate: allergies, current medications, past family history, past medical history, past social history, past surgical history and problem list.    Review of Systems   Constitutional: Negative for chills and fever.   HENT: Negative for congestion, dental problem, ear discharge, ear pain and sore throat.    Respiratory: Positive for shortness of breath (occasional) and wheezing. Negative for cough and chest tightness.    Cardiovascular: Negative for chest pain and palpitations.   Gastrointestinal: Negative for abdominal pain, diarrhea, nausea and vomiting.   Skin: Positive for rash.   Neurological: Negative for dizziness and headache.       Objective   /94 (BP Location: Left arm, Patient Position: Sitting, Cuff Size: Large Adult)   Pulse 120   Temp 96.9 °F (36.1 °C) (Tympanic)    Wt 130 kg (287 lb)   SpO2 99%   BMI 50.84 kg/m²   Physical Exam  Vitals signs reviewed.   Constitutional:       General: She is not in acute distress.     Appearance: Normal appearance. She is well-developed. She is not diaphoretic.   HENT:      Head: Normocephalic and atraumatic.   Eyes:      General:         Right eye: No discharge.         Left eye: No discharge.      Conjunctiva/sclera: Conjunctivae normal.      Pupils: Pupils are equal, round, and reactive to light.   Neck:      Musculoskeletal: Normal range of motion and neck supple.   Cardiovascular:      Rate and Rhythm: Normal rate and regular rhythm.   Pulmonary:      Effort: Pulmonary effort is normal. No respiratory distress.      Breath sounds: Normal breath sounds. No wheezing or rales.   Abdominal:      General: Bowel sounds are normal. There is no distension.      Palpations: Abdomen is soft.      Tenderness: There is no abdominal tenderness.   Musculoskeletal: Normal range of motion.   Skin:     General: Skin is warm and dry.      Findings: Rash (pink skin discoloration in groin) present.   Neurological:      General: No focal deficit present.      Mental Status: She is alert and oriented to person, place, and time.   Psychiatric:         Mood and Affect: Mood normal.         Behavior: Behavior normal.         Thought Content: Thought content normal.         Judgment: Judgment normal.           Assessment/Plan     Diagnoses and all orders for this visit:    1. Shortness of breath (Primary)  Comments:  recent PNA  feeling well, just SOA at times  recheck CXR  recently quit smoking- get PFTs  start symbicort for possible COPD  check labs  echo d/t last CXR showing possible pulmonary edema    2. Rash  Comments:  possibly fungal  treat with clotrimazole  call if no improvement

## 2020-12-15 NOTE — PATIENT INSTRUCTIONS
Start using symbicort twice daily  Get chest xray  Complete echocardiogram and pulmonary function test  Complete blood work  Go to ER for worsening symptoms  Call for any issues or concerns  Apply clotrimazole cream 2 times daily- call if no improvement  Keep area clean and dry

## 2020-12-16 LAB
ANION GAP SERPL CALCULATED.3IONS-SCNC: 11.6 MMOL/L (ref 5–15)
BASOPHILS # BLD AUTO: 0.06 10*3/MM3 (ref 0–0.2)
BASOPHILS NFR BLD AUTO: 0.7 % (ref 0–1.5)
BUN SERPL-MCNC: 10 MG/DL (ref 8–23)
BUN/CREAT SERPL: 10.8 (ref 7–25)
CALCIUM SPEC-SCNC: 9.9 MG/DL (ref 8.6–10.5)
CHLORIDE SERPL-SCNC: 105 MMOL/L (ref 98–107)
CO2 SERPL-SCNC: 25.4 MMOL/L (ref 22–29)
CREAT SERPL-MCNC: 0.93 MG/DL (ref 0.57–1)
DEPRECATED RDW RBC AUTO: 40.8 FL (ref 37–54)
EOSINOPHIL # BLD AUTO: 0 10*3/MM3 (ref 0–0.4)
EOSINOPHIL NFR BLD AUTO: 0 % (ref 0.3–6.2)
ERYTHROCYTE [DISTWIDTH] IN BLOOD BY AUTOMATED COUNT: 13.3 % (ref 12.3–15.4)
GFR SERPL CREATININE-BSD FRML MDRD: 72 ML/MIN/1.73
GLUCOSE SERPL-MCNC: 94 MG/DL (ref 65–99)
HCT VFR BLD AUTO: 36.5 % (ref 34–46.6)
HGB BLD-MCNC: 11.9 G/DL (ref 12–15.9)
LYMPHOCYTES # BLD AUTO: 1.67 10*3/MM3 (ref 0.7–3.1)
LYMPHOCYTES NFR BLD AUTO: 18.6 % (ref 19.6–45.3)
MCH RBC QN AUTO: 28.3 PG (ref 26.6–33)
MCHC RBC AUTO-ENTMCNC: 32.6 G/DL (ref 31.5–35.7)
MCV RBC AUTO: 86.7 FL (ref 79–97)
MONOCYTES # BLD AUTO: 0.69 10*3/MM3 (ref 0.1–0.9)
MONOCYTES NFR BLD AUTO: 7.7 % (ref 5–12)
NEUTROPHILS NFR BLD AUTO: 6.45 10*3/MM3 (ref 1.7–7)
NEUTROPHILS NFR BLD AUTO: 71.9 % (ref 42.7–76)
PLATELET # BLD AUTO: 199 10*3/MM3 (ref 140–450)
PMV BLD AUTO: 13.6 FL (ref 6–12)
POTASSIUM SERPL-SCNC: 4.6 MMOL/L (ref 3.5–5.2)
RBC # BLD AUTO: 4.21 10*6/MM3 (ref 3.77–5.28)
SODIUM SERPL-SCNC: 142 MMOL/L (ref 136–145)
WBC # BLD AUTO: 8.97 10*3/MM3 (ref 3.4–10.8)

## 2020-12-17 ENCOUNTER — TRANSCRIBE ORDERS (OUTPATIENT)
Dept: PULMONOLOGY | Facility: HOSPITAL | Age: 70
End: 2020-12-17

## 2020-12-17 DIAGNOSIS — Z01.818 OTHER SPECIFIED PRE-OPERATIVE EXAMINATION: Primary | ICD-10-CM

## 2020-12-18 DIAGNOSIS — D70.9: Primary | ICD-10-CM

## 2020-12-26 ENCOUNTER — LAB (OUTPATIENT)
Dept: LAB | Facility: HOSPITAL | Age: 70
End: 2020-12-26

## 2020-12-26 ENCOUNTER — HOSPITAL ENCOUNTER (OUTPATIENT)
Dept: GENERAL RADIOLOGY | Facility: HOSPITAL | Age: 70
Discharge: HOME | End: 2020-12-26

## 2020-12-26 DIAGNOSIS — R06.02 SHORTNESS OF BREATH: ICD-10-CM

## 2020-12-26 DIAGNOSIS — I51.7 CARDIOMEGALY: Primary | ICD-10-CM

## 2020-12-26 DIAGNOSIS — R93.89 ABNORMAL CHEST X-RAY: ICD-10-CM

## 2020-12-26 DIAGNOSIS — Z00.00 ENCOUNTER FOR GENERAL ADULT MEDICAL EXAMINATION WITHOUT ABNORMAL FINDINGS: Primary | ICD-10-CM

## 2020-12-26 LAB — SARS-COV-2 ORF1AB RESP QL NAA+PROBE: NOT DETECTED

## 2020-12-26 PROCEDURE — U0004 COV-19 TEST NON-CDC HGH THRU: HCPCS

## 2020-12-26 PROCEDURE — 71046 X-RAY EXAM CHEST 2 VIEWS: CPT

## 2020-12-26 PROCEDURE — C9803 HOPD COVID-19 SPEC COLLECT: HCPCS

## 2020-12-29 ENCOUNTER — HOSPITAL ENCOUNTER (OUTPATIENT)
Dept: RESPIRATORY THERAPY | Facility: HOSPITAL | Age: 70
Discharge: HOME | End: 2020-12-29

## 2020-12-29 ENCOUNTER — HOSPITAL ENCOUNTER (OUTPATIENT)
Dept: CARDIOLOGY | Facility: HOSPITAL | Age: 70
Discharge: HOME | End: 2020-12-29

## 2020-12-29 VITALS
SYSTOLIC BLOOD PRESSURE: 141 MMHG | HEIGHT: 68 IN | BODY MASS INDEX: 43.5 KG/M2 | DIASTOLIC BLOOD PRESSURE: 83 MMHG | WEIGHT: 287 LBS | HEART RATE: 114 BPM

## 2020-12-29 DIAGNOSIS — R06.02 SHORTNESS OF BREATH: ICD-10-CM

## 2020-12-29 PROCEDURE — 63710000001 ALBUTEROL SULFATE HFA 108 (90 BASE) MCG/ACT AEROSOL SOLUTION 6.7 G INHALER: Performed by: NURSE PRACTITIONER

## 2020-12-29 PROCEDURE — 94060 EVALUATION OF WHEEZING: CPT

## 2020-12-29 PROCEDURE — A9270 NON-COVERED ITEM OR SERVICE: HCPCS | Performed by: NURSE PRACTITIONER

## 2020-12-29 PROCEDURE — 94727 GAS DIL/WSHOT DETER LNG VOL: CPT

## 2020-12-29 PROCEDURE — 25010000002 SULFUR HEXAFLUORIDE MICROSPH 60.7-25 MG RECONSTITUTED SUSPENSION: Performed by: INTERNAL MEDICINE

## 2020-12-29 PROCEDURE — 93306 TTE W/DOPPLER COMPLETE: CPT

## 2020-12-29 PROCEDURE — 94729 DIFFUSING CAPACITY: CPT

## 2020-12-29 PROCEDURE — 93306 TTE W/DOPPLER COMPLETE: CPT | Performed by: INTERNAL MEDICINE

## 2020-12-29 RX ORDER — ALBUTEROL SULFATE 90 UG/1
2 AEROSOL, METERED RESPIRATORY (INHALATION) ONCE
Status: COMPLETED | OUTPATIENT
Start: 2020-12-29 | End: 2020-12-29

## 2020-12-29 RX ADMIN — SULFUR HEXAFLUORIDE 2 ML: KIT at 15:45

## 2020-12-29 RX ADMIN — ALBUTEROL SULFATE 2 PUFF: 90 AEROSOL, METERED RESPIRATORY (INHALATION) at 16:21

## 2020-12-30 ENCOUNTER — HOSPITAL ENCOUNTER (INPATIENT)
Facility: HOSPITAL | Age: 70
LOS: 1 days | Discharge: HOME HEALTH | End: 2021-01-01
Attending: EMERGENCY MEDICINE | Admitting: INTERNAL MEDICINE

## 2020-12-30 ENCOUNTER — APPOINTMENT (OUTPATIENT)
Dept: CT IMAGING | Facility: HOSPITAL | Age: 70
End: 2020-12-30

## 2020-12-30 DIAGNOSIS — I50.9 CONGESTIVE HEART FAILURE, UNSPECIFIED HF CHRONICITY, UNSPECIFIED HEART FAILURE TYPE (HCC): Primary | ICD-10-CM

## 2020-12-30 PROBLEM — E66.01 OBESITY, MORBID, BMI 50 OR HIGHER: Chronic | Status: ACTIVE | Noted: 2020-12-30

## 2020-12-30 PROBLEM — E11.9 TYPE 2 DIABETES MELLITUS WITHOUT COMPLICATION, WITHOUT LONG-TERM CURRENT USE OF INSULIN: Chronic | Status: ACTIVE | Noted: 2019-09-03

## 2020-12-30 PROBLEM — F41.1 GENERALIZED ANXIETY DISORDER: Chronic | Status: ACTIVE | Noted: 2020-04-02

## 2020-12-30 PROBLEM — M10.9 GOUT: Chronic | Status: ACTIVE | Noted: 2019-09-03

## 2020-12-30 LAB
ALBUMIN SERPL-MCNC: 3.8 G/DL (ref 3.5–5.2)
ALBUMIN/GLOB SERPL: 1.4 G/DL
ALP SERPL-CCNC: 97 U/L (ref 39–117)
ALT SERPL W P-5'-P-CCNC: 20 U/L (ref 1–33)
ANION GAP SERPL CALCULATED.3IONS-SCNC: 11 MMOL/L (ref 5–15)
APTT PPP: 23.2 SECONDS (ref 24–31)
AST SERPL-CCNC: 20 U/L (ref 1–32)
BASOPHILS # BLD AUTO: 0.1 10*3/MM3 (ref 0–0.2)
BASOPHILS NFR BLD AUTO: 0.6 % (ref 0–1.5)
BH CV ECHO MEAS - ACS: 2 CM
BH CV ECHO MEAS - AO MAX PG (FULL): 0.3 MMHG
BH CV ECHO MEAS - AO MAX PG: 4.6 MMHG
BH CV ECHO MEAS - AO MEAN PG (FULL): 1.3 MMHG
BH CV ECHO MEAS - AO MEAN PG: 3.4 MMHG
BH CV ECHO MEAS - AO ROOT AREA (BSA CORRECTED): 1.2
BH CV ECHO MEAS - AO ROOT AREA: 6.2 CM^2
BH CV ECHO MEAS - AO ROOT DIAM: 2.8 CM
BH CV ECHO MEAS - AO V2 MAX: 107.6 CM/SEC
BH CV ECHO MEAS - AO V2 MEAN: 90.7 CM/SEC
BH CV ECHO MEAS - AO V2 VTI: 20.1 CM
BH CV ECHO MEAS - ASC AORTA: 3.3 CM
BH CV ECHO MEAS - AVA(I,A): 2.7 CM^2
BH CV ECHO MEAS - AVA(I,D): 2.7 CM^2
BH CV ECHO MEAS - AVA(V,A): 3.1 CM^2
BH CV ECHO MEAS - AVA(V,D): 3.1 CM^2
BH CV ECHO MEAS - BSA(HAYCOCK): 2.6 M^2
BH CV ECHO MEAS - BSA: 2.4 M^2
BH CV ECHO MEAS - BZI_BMI: 43.6 KILOGRAMS/M^2
BH CV ECHO MEAS - BZI_METRIC_HEIGHT: 172.7 CM
BH CV ECHO MEAS - BZI_METRIC_WEIGHT: 130.2 KG
BH CV ECHO MEAS - EDV(CUBED): 137.8 ML
BH CV ECHO MEAS - EDV(MOD-SP2): 86.6 ML
BH CV ECHO MEAS - EDV(MOD-SP4): 69 ML
BH CV ECHO MEAS - EDV(TEICH): 127.5 ML
BH CV ECHO MEAS - EF(CUBED): 38.9 %
BH CV ECHO MEAS - EF(MOD-BP): 40 %
BH CV ECHO MEAS - EF(MOD-SP2): 39.7 %
BH CV ECHO MEAS - EF(MOD-SP4): 39 %
BH CV ECHO MEAS - EF(TEICH): 31.9 %
BH CV ECHO MEAS - ESV(CUBED): 84.1 ML
BH CV ECHO MEAS - ESV(MOD-SP2): 52.2 ML
BH CV ECHO MEAS - ESV(MOD-SP4): 42.1 ML
BH CV ECHO MEAS - ESV(TEICH): 86.8 ML
BH CV ECHO MEAS - FS: 15.2 %
BH CV ECHO MEAS - IVS/LVPW: 1
BH CV ECHO MEAS - IVSD: 1.4 CM
BH CV ECHO MEAS - LA DIMENSION(2D): 4 CM
BH CV ECHO MEAS - LA DIMENSION: 3.7 CM
BH CV ECHO MEAS - LA/AO: 1.3
BH CV ECHO MEAS - LV DIASTOLIC VOL/BSA (35-75): 29 ML/M^2
BH CV ECHO MEAS - LV MASS(C)D: 290.6 GRAMS
BH CV ECHO MEAS - LV MASS(C)DI: 121.9 GRAMS/M^2
BH CV ECHO MEAS - LV MAX PG: 4.3 MMHG
BH CV ECHO MEAS - LV MEAN PG: 2.1 MMHG
BH CV ECHO MEAS - LV SYSTOLIC VOL/BSA (12-30): 17.7 ML/M^2
BH CV ECHO MEAS - LV V1 MAX: 104 CM/SEC
BH CV ECHO MEAS - LV V1 MEAN: 63.5 CM/SEC
BH CV ECHO MEAS - LV V1 VTI: 16.9 CM
BH CV ECHO MEAS - LVIDD: 5.2 CM
BH CV ECHO MEAS - LVIDS: 4.4 CM
BH CV ECHO MEAS - LVOT AREA: 3.2 CM^2
BH CV ECHO MEAS - LVOT DIAM: 2 CM
BH CV ECHO MEAS - LVPWD: 1.3 CM
BH CV ECHO MEAS - MR MAX PG: 129.5 MMHG
BH CV ECHO MEAS - MR MAX VEL: 569 CM/SEC
BH CV ECHO MEAS - MR MEAN PG: 92.6 MMHG
BH CV ECHO MEAS - MR MEAN VEL: 451.1 CM/SEC
BH CV ECHO MEAS - MR VTI: 162.3 CM
BH CV ECHO MEAS - MV A MAX VEL: 81.5 CM/SEC
BH CV ECHO MEAS - MV DEC SLOPE: 930.8 CM/SEC^2
BH CV ECHO MEAS - MV DEC TIME: 0.13 SEC
BH CV ECHO MEAS - MV E MAX VEL: 125.2 CM/SEC
BH CV ECHO MEAS - MV E/A: 1.5
BH CV ECHO MEAS - MV MAX PG: 10.9 MMHG
BH CV ECHO MEAS - MV MEAN PG: 4.6 MMHG
BH CV ECHO MEAS - MV V2 MAX: 164.9 CM/SEC
BH CV ECHO MEAS - MV V2 MEAN: 100 CM/SEC
BH CV ECHO MEAS - MV V2 VTI: 26.3 CM
BH CV ECHO MEAS - MVA(VTI): 2.1 CM^2
BH CV ECHO MEAS - PA MAX PG: 6.8 MMHG
BH CV ECHO MEAS - PA MEAN PG: 3.8 MMHG
BH CV ECHO MEAS - PA V2 MAX: 130.5 CM/SEC
BH CV ECHO MEAS - PA V2 MEAN: 90.6 CM/SEC
BH CV ECHO MEAS - PA V2 VTI: 21.7 CM
BH CV ECHO MEAS - PI END-D VEL: 178.8 CM/SEC
BH CV ECHO MEAS - PI MAX PG: 29.3 MMHG
BH CV ECHO MEAS - PI MAX VEL: 270.7 CM/SEC
BH CV ECHO MEAS - RAP SYSTOLE: 15 MMHG
BH CV ECHO MEAS - RVDD: 2.9 CM
BH CV ECHO MEAS - RVSP: 72.7 MMHG
BH CV ECHO MEAS - SI(AO): 52.1 ML/M^2
BH CV ECHO MEAS - SI(CUBED): 22.5 ML/M^2
BH CV ECHO MEAS - SI(LVOT): 22.9 ML/M^2
BH CV ECHO MEAS - SI(MOD-SP2): 14.4 ML/M^2
BH CV ECHO MEAS - SI(MOD-SP4): 11.3 ML/M^2
BH CV ECHO MEAS - SI(TEICH): 17.1 ML/M^2
BH CV ECHO MEAS - SV(AO): 124.2 ML
BH CV ECHO MEAS - SV(CUBED): 53.6 ML
BH CV ECHO MEAS - SV(LVOT): 54.6 ML
BH CV ECHO MEAS - SV(MOD-SP2): 34.4 ML
BH CV ECHO MEAS - SV(MOD-SP4): 26.9 ML
BH CV ECHO MEAS - SV(TEICH): 40.7 ML
BH CV ECHO MEAS - TR MAX VEL: 379.5 CM/SEC
BILIRUB SERPL-MCNC: 0.4 MG/DL (ref 0–1.2)
BILIRUB UR QL STRIP: NEGATIVE
BUN SERPL-MCNC: 12 MG/DL (ref 8–23)
BUN/CREAT SERPL: 13.2 (ref 7–25)
CALCIUM SPEC-SCNC: 9.2 MG/DL (ref 8.6–10.5)
CHLORIDE SERPL-SCNC: 105 MMOL/L (ref 98–107)
CLARITY UR: CLEAR
CO2 SERPL-SCNC: 24 MMOL/L (ref 22–29)
COLOR UR: YELLOW
CREAT SERPL-MCNC: 0.91 MG/DL (ref 0.57–1)
DEPRECATED RDW RBC AUTO: 48.6 FL (ref 37–54)
EOSINOPHIL # BLD AUTO: 0 10*3/MM3 (ref 0–0.4)
EOSINOPHIL NFR BLD AUTO: 0.1 % (ref 0.3–6.2)
ERYTHROCYTE [DISTWIDTH] IN BLOOD BY AUTOMATED COUNT: 15.7 % (ref 12.3–15.4)
GFR SERPL CREATININE-BSD FRML MDRD: 74 ML/MIN/1.73
GLOBULIN UR ELPH-MCNC: 2.7 GM/DL
GLUCOSE BLDC GLUCOMTR-MCNC: 95 MG/DL (ref 70–105)
GLUCOSE SERPL-MCNC: 130 MG/DL (ref 65–99)
GLUCOSE UR STRIP-MCNC: NEGATIVE MG/DL
HBA1C MFR BLD: 5.4 % (ref 3.5–5.6)
HCT VFR BLD AUTO: 35.9 % (ref 34–46.6)
HGB BLD-MCNC: 11.6 G/DL (ref 12–15.9)
HGB UR QL STRIP.AUTO: NEGATIVE
INR PPP: 0.95 (ref 0.93–1.1)
KETONES UR QL STRIP: NEGATIVE
LEUKOCYTE ESTERASE UR QL STRIP.AUTO: NEGATIVE
LYMPHOCYTES # BLD AUTO: 1.4 10*3/MM3 (ref 0.7–3.1)
LYMPHOCYTES NFR BLD AUTO: 14.9 % (ref 19.6–45.3)
MCH RBC QN AUTO: 28.9 PG (ref 26.6–33)
MCHC RBC AUTO-ENTMCNC: 32.4 G/DL (ref 31.5–35.7)
MCV RBC AUTO: 89.1 FL (ref 79–97)
MONOCYTES # BLD AUTO: 0.5 10*3/MM3 (ref 0.1–0.9)
MONOCYTES NFR BLD AUTO: 6 % (ref 5–12)
NEUTROPHILS NFR BLD AUTO: 7.2 10*3/MM3 (ref 1.7–7)
NEUTROPHILS NFR BLD AUTO: 78.4 % (ref 42.7–76)
NITRITE UR QL STRIP: NEGATIVE
NRBC BLD AUTO-RTO: 0.1 /100 WBC (ref 0–0.2)
NT-PROBNP SERPL-MCNC: 1716 PG/ML (ref 0–900)
PH UR STRIP.AUTO: 7 [PH] (ref 5–8)
PLATELET # BLD AUTO: 232 10*3/MM3 (ref 140–450)
PMV BLD AUTO: 9.7 FL (ref 6–12)
POTASSIUM SERPL-SCNC: 3.9 MMOL/L (ref 3.5–5.2)
PROCALCITONIN SERPL-MCNC: 0.1 NG/ML (ref 0–0.25)
PROT SERPL-MCNC: 6.5 G/DL (ref 6–8.5)
PROT UR QL STRIP: NEGATIVE
PROTHROMBIN TIME: 10.5 SECONDS (ref 9.6–11.7)
RBC # BLD AUTO: 4.03 10*6/MM3 (ref 3.77–5.28)
SARS-COV-2 RNA PNL SPEC NAA+PROBE: NORMAL
SODIUM SERPL-SCNC: 140 MMOL/L (ref 136–145)
SP GR UR STRIP: 1.01 (ref 1–1.03)
TROPONIN T SERPL-MCNC: <0.01 NG/ML (ref 0–0.03)
UROBILINOGEN UR QL STRIP: NORMAL
WBC # BLD AUTO: 9.2 10*3/MM3 (ref 3.4–10.8)

## 2020-12-30 PROCEDURE — 71275 CT ANGIOGRAPHY CHEST: CPT

## 2020-12-30 PROCEDURE — 81003 URINALYSIS AUTO W/O SCOPE: CPT | Performed by: EMERGENCY MEDICINE

## 2020-12-30 PROCEDURE — 99223 1ST HOSP IP/OBS HIGH 75: CPT | Performed by: INTERNAL MEDICINE

## 2020-12-30 PROCEDURE — 83880 ASSAY OF NATRIURETIC PEPTIDE: CPT | Performed by: EMERGENCY MEDICINE

## 2020-12-30 PROCEDURE — 99222 1ST HOSP IP/OBS MODERATE 55: CPT | Performed by: INTERNAL MEDICINE

## 2020-12-30 PROCEDURE — 84145 PROCALCITONIN (PCT): CPT | Performed by: NURSE PRACTITIONER

## 2020-12-30 PROCEDURE — 83036 HEMOGLOBIN GLYCOSYLATED A1C: CPT | Performed by: NURSE PRACTITIONER

## 2020-12-30 PROCEDURE — 85025 COMPLETE CBC W/AUTO DIFF WBC: CPT | Performed by: EMERGENCY MEDICINE

## 2020-12-30 PROCEDURE — 85610 PROTHROMBIN TIME: CPT | Performed by: EMERGENCY MEDICINE

## 2020-12-30 PROCEDURE — 25010000002 FUROSEMIDE PER 20 MG: Performed by: EMERGENCY MEDICINE

## 2020-12-30 PROCEDURE — 0 IOPAMIDOL PER 1 ML: Performed by: EMERGENCY MEDICINE

## 2020-12-30 PROCEDURE — 82962 GLUCOSE BLOOD TEST: CPT

## 2020-12-30 PROCEDURE — 85730 THROMBOPLASTIN TIME PARTIAL: CPT | Performed by: EMERGENCY MEDICINE

## 2020-12-30 PROCEDURE — 93005 ELECTROCARDIOGRAM TRACING: CPT | Performed by: EMERGENCY MEDICINE

## 2020-12-30 PROCEDURE — 84484 ASSAY OF TROPONIN QUANT: CPT | Performed by: EMERGENCY MEDICINE

## 2020-12-30 PROCEDURE — 80053 COMPREHEN METABOLIC PANEL: CPT | Performed by: EMERGENCY MEDICINE

## 2020-12-30 PROCEDURE — 87635 SARS-COV-2 COVID-19 AMP PRB: CPT | Performed by: EMERGENCY MEDICINE

## 2020-12-30 PROCEDURE — 99285 EMERGENCY DEPT VISIT HI MDM: CPT

## 2020-12-30 RX ORDER — ONDANSETRON 2 MG/ML
4 INJECTION INTRAMUSCULAR; INTRAVENOUS EVERY 6 HOURS PRN
Status: DISCONTINUED | OUTPATIENT
Start: 2020-12-30 | End: 2021-01-01 | Stop reason: HOSPADM

## 2020-12-30 RX ORDER — ALLOPURINOL 300 MG/1
300 TABLET ORAL DAILY
Status: DISCONTINUED | OUTPATIENT
Start: 2020-12-30 | End: 2021-01-01 | Stop reason: HOSPADM

## 2020-12-30 RX ORDER — SODIUM CHLORIDE 0.9 % (FLUSH) 0.9 %
10 SYRINGE (ML) INJECTION AS NEEDED
Status: DISCONTINUED | OUTPATIENT
Start: 2020-12-30 | End: 2021-01-01 | Stop reason: HOSPADM

## 2020-12-30 RX ORDER — ACETAMINOPHEN 325 MG/1
650 TABLET ORAL EVERY 4 HOURS PRN
Status: DISCONTINUED | OUTPATIENT
Start: 2020-12-30 | End: 2021-01-01 | Stop reason: HOSPADM

## 2020-12-30 RX ORDER — INSULIN LISPRO 100 [IU]/ML
0-9 INJECTION, SOLUTION INTRAVENOUS; SUBCUTANEOUS
Status: DISCONTINUED | OUTPATIENT
Start: 2020-12-30 | End: 2021-01-01 | Stop reason: HOSPADM

## 2020-12-30 RX ORDER — FUROSEMIDE 10 MG/ML
40 INJECTION INTRAMUSCULAR; INTRAVENOUS ONCE
Status: COMPLETED | OUTPATIENT
Start: 2020-12-30 | End: 2020-12-30

## 2020-12-30 RX ORDER — ONDANSETRON 4 MG/1
4 TABLET, FILM COATED ORAL EVERY 6 HOURS PRN
Status: DISCONTINUED | OUTPATIENT
Start: 2020-12-30 | End: 2021-01-01 | Stop reason: HOSPADM

## 2020-12-30 RX ORDER — ACETAMINOPHEN 650 MG/1
650 SUPPOSITORY RECTAL EVERY 4 HOURS PRN
Status: DISCONTINUED | OUTPATIENT
Start: 2020-12-30 | End: 2021-01-01 | Stop reason: HOSPADM

## 2020-12-30 RX ORDER — DEXTROSE MONOHYDRATE 25 G/50ML
25 INJECTION, SOLUTION INTRAVENOUS
Status: DISCONTINUED | OUTPATIENT
Start: 2020-12-30 | End: 2021-01-01 | Stop reason: HOSPADM

## 2020-12-30 RX ORDER — INSULIN LISPRO 100 [IU]/ML
0-9 INJECTION, SOLUTION INTRAVENOUS; SUBCUTANEOUS AS NEEDED
Status: DISCONTINUED | OUTPATIENT
Start: 2020-12-30 | End: 2021-01-01 | Stop reason: HOSPADM

## 2020-12-30 RX ORDER — NICOTINE POLACRILEX 4 MG
15 LOZENGE BUCCAL
Status: DISCONTINUED | OUTPATIENT
Start: 2020-12-30 | End: 2021-01-01 | Stop reason: HOSPADM

## 2020-12-30 RX ORDER — LOSARTAN POTASSIUM 25 MG/1
25 TABLET ORAL
Status: DISCONTINUED | OUTPATIENT
Start: 2020-12-30 | End: 2021-01-01 | Stop reason: HOSPADM

## 2020-12-30 RX ORDER — ACETAMINOPHEN 160 MG/5ML
650 SOLUTION ORAL EVERY 4 HOURS PRN
Status: DISCONTINUED | OUTPATIENT
Start: 2020-12-30 | End: 2021-01-01 | Stop reason: HOSPADM

## 2020-12-30 RX ORDER — SODIUM CHLORIDE 0.9 % (FLUSH) 0.9 %
10 SYRINGE (ML) INJECTION EVERY 12 HOURS SCHEDULED
Status: DISCONTINUED | OUTPATIENT
Start: 2020-12-30 | End: 2021-01-01 | Stop reason: HOSPADM

## 2020-12-30 RX ADMIN — IOPAMIDOL 100 ML: 755 INJECTION, SOLUTION INTRAVENOUS at 13:49

## 2020-12-30 RX ADMIN — METOPROLOL TARTRATE 12.5 MG: 25 TABLET, FILM COATED ORAL at 21:06

## 2020-12-30 RX ADMIN — FUROSEMIDE 40 MG: 10 INJECTION, SOLUTION INTRAMUSCULAR; INTRAVENOUS at 14:50

## 2020-12-31 LAB
ANION GAP SERPL CALCULATED.3IONS-SCNC: 11 MMOL/L (ref 5–15)
B PARAPERT DNA SPEC QL NAA+PROBE: NOT DETECTED
B PERT DNA SPEC QL NAA+PROBE: NOT DETECTED
BUN SERPL-MCNC: 12 MG/DL (ref 8–23)
BUN/CREAT SERPL: 11.8 (ref 7–25)
C PNEUM DNA NPH QL NAA+NON-PROBE: NOT DETECTED
CALCIUM SPEC-SCNC: 9.5 MG/DL (ref 8.6–10.5)
CHLORIDE SERPL-SCNC: 104 MMOL/L (ref 98–107)
CHOLEST SERPL-MCNC: 137 MG/DL (ref 0–200)
CO2 SERPL-SCNC: 26 MMOL/L (ref 22–29)
CREAT SERPL-MCNC: 1.02 MG/DL (ref 0.57–1)
DEPRECATED RDW RBC AUTO: 51.6 FL (ref 37–54)
ERYTHROCYTE [DISTWIDTH] IN BLOOD BY AUTOMATED COUNT: 16.3 % (ref 12.3–15.4)
FLUAV SUBTYP SPEC NAA+PROBE: NOT DETECTED
FLUBV RNA ISLT QL NAA+PROBE: NOT DETECTED
GFR SERPL CREATININE-BSD FRML MDRD: 65 ML/MIN/1.73
GLUCOSE BLDC GLUCOMTR-MCNC: 116 MG/DL (ref 70–105)
GLUCOSE BLDC GLUCOMTR-MCNC: 117 MG/DL (ref 70–105)
GLUCOSE BLDC GLUCOMTR-MCNC: 155 MG/DL (ref 70–105)
GLUCOSE BLDC GLUCOMTR-MCNC: 97 MG/DL (ref 70–105)
GLUCOSE SERPL-MCNC: 121 MG/DL (ref 65–99)
HADV DNA SPEC NAA+PROBE: NOT DETECTED
HCOV 229E RNA SPEC QL NAA+PROBE: NOT DETECTED
HCOV HKU1 RNA SPEC QL NAA+PROBE: NOT DETECTED
HCOV NL63 RNA SPEC QL NAA+PROBE: NOT DETECTED
HCOV OC43 RNA SPEC QL NAA+PROBE: NOT DETECTED
HCT VFR BLD AUTO: 33.4 % (ref 34–46.6)
HDLC SERPL-MCNC: 46 MG/DL (ref 40–60)
HGB BLD-MCNC: 10.8 G/DL (ref 12–15.9)
HMPV RNA NPH QL NAA+NON-PROBE: NOT DETECTED
HPIV1 RNA SPEC QL NAA+PROBE: NOT DETECTED
HPIV2 RNA SPEC QL NAA+PROBE: NOT DETECTED
HPIV3 RNA NPH QL NAA+PROBE: NOT DETECTED
HPIV4 P GENE NPH QL NAA+PROBE: NOT DETECTED
LDLC SERPL CALC-MCNC: 68 MG/DL (ref 0–100)
LDLC/HDLC SERPL: 1.42 {RATIO}
M PNEUMO IGG SER IA-ACNC: NOT DETECTED
MAGNESIUM SERPL-MCNC: 1.8 MG/DL (ref 1.6–2.4)
MCH RBC QN AUTO: 28.7 PG (ref 26.6–33)
MCHC RBC AUTO-ENTMCNC: 32.2 G/DL (ref 31.5–35.7)
MCV RBC AUTO: 88.9 FL (ref 79–97)
NT-PROBNP SERPL-MCNC: 1603 PG/ML (ref 0–900)
PLATELET # BLD AUTO: 227 10*3/MM3 (ref 140–450)
PMV BLD AUTO: 9.8 FL (ref 6–12)
POTASSIUM SERPL-SCNC: 3.4 MMOL/L (ref 3.5–5.2)
RBC # BLD AUTO: 3.76 10*6/MM3 (ref 3.77–5.28)
RHINOVIRUS RNA SPEC NAA+PROBE: NOT DETECTED
RSV RNA NPH QL NAA+NON-PROBE: NOT DETECTED
SARS-COV-2 RNA NPH QL NAA+NON-PROBE: NOT DETECTED
SODIUM SERPL-SCNC: 141 MMOL/L (ref 136–145)
TRIGL SERPL-MCNC: 129 MG/DL (ref 0–150)
TROPONIN T SERPL-MCNC: <0.01 NG/ML (ref 0–0.03)
VLDLC SERPL-MCNC: 23 MG/DL (ref 5–40)
WBC # BLD AUTO: 9 10*3/MM3 (ref 3.4–10.8)

## 2020-12-31 PROCEDURE — 25010000002 ENOXAPARIN PER 10 MG: Performed by: INTERNAL MEDICINE

## 2020-12-31 PROCEDURE — 80061 LIPID PANEL: CPT | Performed by: NURSE PRACTITIONER

## 2020-12-31 PROCEDURE — 80048 BASIC METABOLIC PNL TOTAL CA: CPT | Performed by: NURSE PRACTITIONER

## 2020-12-31 PROCEDURE — 99233 SBSQ HOSP IP/OBS HIGH 50: CPT | Performed by: INTERNAL MEDICINE

## 2020-12-31 PROCEDURE — 83880 ASSAY OF NATRIURETIC PEPTIDE: CPT | Performed by: NURSE PRACTITIONER

## 2020-12-31 PROCEDURE — 25010000002 FUROSEMIDE PER 20 MG: Performed by: INTERNAL MEDICINE

## 2020-12-31 PROCEDURE — 83735 ASSAY OF MAGNESIUM: CPT | Performed by: NURSE PRACTITIONER

## 2020-12-31 PROCEDURE — 25010000002 ENOXAPARIN PER 10 MG: Performed by: NURSE PRACTITIONER

## 2020-12-31 PROCEDURE — 84484 ASSAY OF TROPONIN QUANT: CPT | Performed by: NURSE PRACTITIONER

## 2020-12-31 PROCEDURE — 0202U NFCT DS 22 TRGT SARS-COV-2: CPT | Performed by: EMERGENCY MEDICINE

## 2020-12-31 PROCEDURE — 63710000001 INSULIN LISPRO (HUMAN) PER 5 UNITS: Performed by: NURSE PRACTITIONER

## 2020-12-31 PROCEDURE — 82962 GLUCOSE BLOOD TEST: CPT

## 2020-12-31 PROCEDURE — 85027 COMPLETE CBC AUTOMATED: CPT | Performed by: NURSE PRACTITIONER

## 2020-12-31 RX ORDER — FUROSEMIDE 10 MG/ML
40 INJECTION INTRAMUSCULAR; INTRAVENOUS EVERY 12 HOURS SCHEDULED
Status: DISCONTINUED | OUTPATIENT
Start: 2020-12-31 | End: 2021-01-01 | Stop reason: HOSPADM

## 2020-12-31 RX ORDER — LISINOPRIL 5 MG/1
5 TABLET ORAL DAILY
Qty: 30 TABLET | Refills: 11 | Status: SHIPPED | OUTPATIENT
Start: 2020-12-31 | End: 2020-12-31 | Stop reason: HOSPADM

## 2020-12-31 RX ORDER — NYSTATIN 100000 [USP'U]/G
POWDER TOPICAL EVERY 8 HOURS SCHEDULED
Status: DISCONTINUED | OUTPATIENT
Start: 2020-12-31 | End: 2021-01-01 | Stop reason: HOSPADM

## 2020-12-31 RX ADMIN — LOSARTAN POTASSIUM 25 MG: 25 TABLET, FILM COATED ORAL at 00:34

## 2020-12-31 RX ADMIN — Medication 10 ML: at 00:34

## 2020-12-31 RX ADMIN — INSULIN LISPRO 2 UNITS: 100 INJECTION, SOLUTION INTRAVENOUS; SUBCUTANEOUS at 13:22

## 2020-12-31 RX ADMIN — ENOXAPARIN SODIUM 40 MG: 40 INJECTION SUBCUTANEOUS at 00:57

## 2020-12-31 RX ADMIN — NYSTATIN: 100000 POWDER TOPICAL at 21:28

## 2020-12-31 RX ADMIN — METOPROLOL TARTRATE 12.5 MG: 25 TABLET, FILM COATED ORAL at 10:14

## 2020-12-31 RX ADMIN — FUROSEMIDE 40 MG: 10 INJECTION, SOLUTION INTRAMUSCULAR; INTRAVENOUS at 21:23

## 2020-12-31 RX ADMIN — Medication 10 ML: at 10:14

## 2020-12-31 RX ADMIN — Medication 10 ML: at 21:29

## 2020-12-31 RX ADMIN — ENOXAPARIN SODIUM 40 MG: 40 INJECTION SUBCUTANEOUS at 13:23

## 2020-12-31 RX ADMIN — ALLOPURINOL 300 MG: 300 TABLET ORAL at 00:34

## 2020-12-31 RX ADMIN — METOPROLOL TARTRATE 12.5 MG: 25 TABLET, FILM COATED ORAL at 21:24

## 2021-01-01 ENCOUNTER — READMISSION MANAGEMENT (OUTPATIENT)
Dept: CALL CENTER | Facility: HOSPITAL | Age: 71
End: 2021-01-01

## 2021-01-01 VITALS
WEIGHT: 283.07 LBS | OXYGEN SATURATION: 95 % | TEMPERATURE: 97.4 F | HEIGHT: 63 IN | BODY MASS INDEX: 50.16 KG/M2 | HEART RATE: 86 BPM | RESPIRATION RATE: 19 BRPM | DIASTOLIC BLOOD PRESSURE: 90 MMHG | SYSTOLIC BLOOD PRESSURE: 125 MMHG

## 2021-01-01 LAB
ANION GAP SERPL CALCULATED.3IONS-SCNC: 8 MMOL/L (ref 5–15)
BUN SERPL-MCNC: 15 MG/DL (ref 8–23)
BUN/CREAT SERPL: 14.7 (ref 7–25)
CALCIUM SPEC-SCNC: 9.6 MG/DL (ref 8.6–10.5)
CHLORIDE SERPL-SCNC: 106 MMOL/L (ref 98–107)
CO2 SERPL-SCNC: 27 MMOL/L (ref 22–29)
CREAT SERPL-MCNC: 1.02 MG/DL (ref 0.57–1)
GFR SERPL CREATININE-BSD FRML MDRD: 65 ML/MIN/1.73
GLUCOSE BLDC GLUCOMTR-MCNC: 128 MG/DL (ref 70–105)
GLUCOSE BLDC GLUCOMTR-MCNC: 132 MG/DL (ref 70–105)
GLUCOSE BLDC GLUCOMTR-MCNC: 139 MG/DL (ref 70–105)
GLUCOSE SERPL-MCNC: 108 MG/DL (ref 65–99)
MAGNESIUM SERPL-MCNC: 2.1 MG/DL (ref 1.6–2.4)
POTASSIUM SERPL-SCNC: 3.7 MMOL/L (ref 3.5–5.2)
QT INTERVAL: 356 MS
SODIUM SERPL-SCNC: 141 MMOL/L (ref 136–145)

## 2021-01-01 PROCEDURE — 80048 BASIC METABOLIC PNL TOTAL CA: CPT | Performed by: NURSE PRACTITIONER

## 2021-01-01 PROCEDURE — 25010000002 FUROSEMIDE PER 20 MG: Performed by: INTERNAL MEDICINE

## 2021-01-01 PROCEDURE — 25010000002 ENOXAPARIN PER 10 MG: Performed by: INTERNAL MEDICINE

## 2021-01-01 PROCEDURE — 99233 SBSQ HOSP IP/OBS HIGH 50: CPT | Performed by: INTERNAL MEDICINE

## 2021-01-01 PROCEDURE — 82962 GLUCOSE BLOOD TEST: CPT

## 2021-01-01 PROCEDURE — 99239 HOSP IP/OBS DSCHRG MGMT >30: CPT | Performed by: INTERNAL MEDICINE

## 2021-01-01 PROCEDURE — 83735 ASSAY OF MAGNESIUM: CPT | Performed by: NURSE PRACTITIONER

## 2021-01-01 RX ORDER — BUMETANIDE 2 MG/1
2 TABLET ORAL DAILY
Qty: 30 TABLET | Refills: 1 | Status: SHIPPED | OUTPATIENT
Start: 2021-01-01 | End: 2021-02-19

## 2021-01-01 RX ORDER — LOSARTAN POTASSIUM 25 MG/1
25 TABLET ORAL
Qty: 30 TABLET | Refills: 1 | Status: SHIPPED | OUTPATIENT
Start: 2021-01-02 | End: 2021-02-19

## 2021-01-01 RX ORDER — NYSTATIN 100000 [USP'U]/G
POWDER TOPICAL EVERY 8 HOURS SCHEDULED
Qty: 30 G | Refills: 3 | Status: SHIPPED | OUTPATIENT
Start: 2021-01-01 | End: 2021-02-19

## 2021-01-01 RX ADMIN — ENOXAPARIN SODIUM 40 MG: 40 INJECTION SUBCUTANEOUS at 13:37

## 2021-01-01 RX ADMIN — ACETAMINOPHEN 650 MG: 325 TABLET, FILM COATED ORAL at 08:34

## 2021-01-01 RX ADMIN — LOSARTAN POTASSIUM 25 MG: 25 TABLET, FILM COATED ORAL at 08:34

## 2021-01-01 RX ADMIN — FUROSEMIDE 40 MG: 10 INJECTION, SOLUTION INTRAMUSCULAR; INTRAVENOUS at 05:13

## 2021-01-01 RX ADMIN — ALLOPURINOL 300 MG: 300 TABLET ORAL at 08:34

## 2021-01-01 RX ADMIN — ENOXAPARIN SODIUM 40 MG: 40 INJECTION SUBCUTANEOUS at 02:15

## 2021-01-01 RX ADMIN — NYSTATIN: 100000 POWDER TOPICAL at 13:37

## 2021-01-01 RX ADMIN — NYSTATIN: 100000 POWDER TOPICAL at 05:14

## 2021-01-01 RX ADMIN — Medication 10 ML: at 09:18

## 2021-01-01 RX ADMIN — METOPROLOL TARTRATE 12.5 MG: 25 TABLET, FILM COATED ORAL at 08:34

## 2021-01-01 NOTE — PLAN OF CARE
Problem: Adult Inpatient Plan of Care  Goal: Plan of Care Review  Outcome: Ongoing, Progressing  Goal: Patient-Specific Goal (Individualized)  Outcome: Ongoing, Progressing  Goal: Absence of Hospital-Acquired Illness or Injury  Outcome: Ongoing, Progressing  Goal: Optimal Comfort and Wellbeing  Outcome: Ongoing, Progressing  Goal: Readiness for Transition of Care  Outcome: Ongoing, Progressing     Problem: Fluid Imbalance (Heart Failure)  Goal: Fluid Balance  Outcome: Ongoing, Progressing     Problem: Adult Inpatient Plan of Care  Goal: Plan of Care Review  Outcome: Ongoing, Progressing  Goal: Patient-Specific Goal (Individualized)  Outcome: Ongoing, Progressing  Goal: Absence of Hospital-Acquired Illness or Injury  Outcome: Ongoing, Progressing  Goal: Optimal Comfort and Wellbeing  Outcome: Ongoing, Progressing  Goal: Readiness for Transition of Care  Outcome: Ongoing, Progressing     Problem: Fluid Imbalance (Heart Failure)  Goal: Fluid Balance  Outcome: Ongoing, Progressing   Goal Outcome Evaluation:

## 2021-01-01 NOTE — PROGRESS NOTES
"    Reason for follow-up: Acute systolic congestive heart failure  Severe pulmonary hypertension  Mitral insufficiency     Patient Care Team:  Latanya Hawk MD as PCP - General (Family Medicine)    Subjective .  Patient seen and examined.  Chart reviewed.  Labs reviewed.  Patient is feeling better wants to go home and come back as outpatient for cardiac ischemic work-up.     Review of Systems   Constitution: Negative for chills and fever.   Cardiovascular: Negative for chest pain and palpitations.   Respiratory: Negative for cough and hemoptysis.    Gastrointestinal: Negative for nausea and vomiting.         Patient has no known allergies.    Scheduled Meds:allopurinol, 300 mg, Oral, Daily  enoxaparin, 40 mg, Subcutaneous, Q12H  furosemide, 40 mg, Intravenous, Q12H  insulin lispro, 0-9 Units, Subcutaneous, TID AC  losartan, 25 mg, Oral, Q24H  metoprolol tartrate, 12.5 mg, Oral, Q12H  nystatin, , Topical, Q8H  sodium chloride, 10 mL, Intravenous, Q12H      Continuous Infusions:   PRN Meds:.•  acetaminophen **OR** acetaminophen **OR** acetaminophen  •  dextrose  •  dextrose  •  glucagon (human recombinant)  •  insulin lispro **AND** insulin lispro  •  ondansetron **OR** ondansetron  •  [COMPLETED] Insert peripheral IV **AND** sodium chloride  •  sodium chloride    Objective   Looks comfortable lying in the bed    VITAL SIGNS  Vitals:    12/31/20 2124 12/31/20 2215 01/01/21 0212 01/01/21 0606   BP: 112/69  122/88 108/67   BP Location:   Right arm Right arm   Patient Position:   Lying Lying   Pulse: 95 90 89 82   Resp:  16 16 20   Temp:  97.8 °F (36.6 °C) 98.2 °F (36.8 °C) 98.2 °F (36.8 °C)   TempSrc:  Oral  Oral   SpO2:  93%  94%   Weight:    128 kg (283 lb 1.1 oz)   Height:           Flowsheet Rows      First Filed Value   Admission Height  160 cm (63\") Documented at 12/30/2020 1215   Admission Weight  133 kg (293 lb 10.4 oz) Documented at 12/30/2020 1215           TELEMETRY: Sinus rhythm    Physical " Exam:  Physical Exam   Constitutional:       Appearance: Well-developed.,  Obese  Eyes:      General: No scleral icterus.     Conjunctiva/sclera: Conjunctivae normal.   HENT:      Head: Normocephalic and atraumatic.    Mouth/Throat:      Mouth: No oral lesions.      Pharynx: Uvula midline.   Neck:      Musculoskeletal: Neck supple.      Thyroid: No thyromegaly.      Vascular: No carotid bruit or JVD.      Trachea: Trachea normal.   Pulmonary:      Effort: Pulmonary effort is normal.      Breath sounds: Rhonchi present. Rales present.   Cardiovascular:      Normal rate. Regular rhythm.      Murmurs: There is a systolic murmur.      No gallop.   Pulses:     Intact distal pulses.   Edema:     Pretibial: bilateral 1+ edema of the pretibial area.     Ankle: bilateral 1+ edema of the ankle.  Abdominal:      General: Bowel sounds are normal.      Palpations: Abdomen is soft.   Musculoskeletal: Normal range of motion.   Skin:     General: Skin is warm. There is no cyanosis.   Neurological:      Mental Status: Alert and oriented to person, place, and time.      Comments: No focal deficits   Psychiatric:         Behavior: Behavior is cooperative          LAB RESULTS (LAST 7 DAYS)    CBC  Results from last 7 days   Lab Units 12/31/20  0351 12/30/20  1256   WBC 10*3/mm3 9.00 9.20   RBC 10*6/mm3 3.76* 4.03   HEMOGLOBIN g/dL 10.8* 11.6*   HEMATOCRIT % 33.4* 35.9   MCV fL 88.9 89.1   PLATELETS 10*3/mm3 227 232       BMP  Results from last 7 days   Lab Units 01/01/21  0320 12/31/20  0351 12/30/20  1351   SODIUM mmol/L 141 141 140   POTASSIUM mmol/L 3.7 3.4* 3.9   CHLORIDE mmol/L 106 104 105   CO2 mmol/L 27.0 26.0 24.0   BUN mg/dL 15 12 12   CREATININE mg/dL 1.02* 1.02* 0.91   GLUCOSE mg/dL 108* 121* 130*   MAGNESIUM mg/dL 2.1 1.8  --        CMP   Results from last 7 days   Lab Units 01/01/21  0320 12/31/20  0351 12/30/20  1351   SODIUM mmol/L 141 141 140   POTASSIUM mmol/L 3.7 3.4* 3.9   CHLORIDE mmol/L 106 104 105   CO2 mmol/L  27.0 26.0 24.0   BUN mg/dL 15 12 12   CREATININE mg/dL 1.02* 1.02* 0.91   GLUCOSE mg/dL 108* 121* 130*   ALBUMIN g/dL  --   --  3.80   BILIRUBIN mg/dL  --   --  0.4   ALK PHOS U/L  --   --  97   AST (SGOT) U/L  --   --  20   ALT (SGPT) U/L  --   --  20         BNP        TROPONIN  Results from last 7 days   Lab Units 12/31/20  0351   TROPONIN T ng/mL <0.010       CoAg  Results from last 7 days   Lab Units 12/30/20  1256   INR  0.95   APTT seconds 23.2*       Creatinine Clearance  Estimated Creatinine Clearance: 67.9 mL/min (A) (by C-G formula based on SCr of 1.02 mg/dL (H)).    ABG        Radiology  Ct Chest Pulmonary Embolism    Result Date: 12/30/2020   1. No pulmonary embolism. 2. No right hilar mass or adenopathy. The right heart or fullness on the previous chest x-ray likely accounted for by prominence of pulmonary vascular structures. 3. The central pulmonary arteries appear mildly dilated. Correlate clinically for pulmonary artery hypertension. 4. Mild interstitial thickening favored to reflect mild interstitial edema. Small bilateral pleural effusions. 4. Mild linear atelectatic changes in the lower lobes and within the lingular segment left upper lobe.  Electronically Signed By-Daiana Brown MD On:12/30/2020 1:55 PM This report was finalized on [id number] by  Daiana Brown MD.            EKG        I personally viewed and interpreted the patient's EKG/Telemetry data:    ECHOCARDIOGRAM:    Results for orders placed during the hospital encounter of 12/29/20   Adult Transthoracic Echo Complete W/ Cont if Necessary Per Protocol    Narrative · Estimated right ventricular systolic pressure from tricuspid   regurgitation is markedly elevated (>55 mmHg).  · Severe pulmonary hypertension is present.  · Moderate to severe tricuspid valve regurgitation is present.  · Moderate to severe mitral valve regurgitation is present with a   posteriorly-directed jet noted.  · The left ventricular cavity is mildly  dilated.  · Estimated left ventricular EF was in agreement with the calculated left   ventricular EF. Left ventricular ejection fraction appears to be 36 - 40%.   Left ventricular systolic function is moderately decreased.  · Left ventricular diastolic function is consistent with (grade III w/high   LAP) reversible restrictive pattern.  · Left atrial volume is mildly increased.  · The right ventricular cavity is mild to moderately dilated.  · The right atrial cavity is mildly dilated.        STRESS MYOVIEW:    CARDIAC CATHETERIZATION:    OTHER:         Assessment/Plan       Acute exacerbation of CHF (congestive heart failure) (CMS/[facility])    Gout    Hypertension    Type 2 diabetes mellitus without complication, without long-term current use of insulin (CMS/[facility])    Obesity, morbid, BMI 50 or higher (CMS/[facility])      Acute systolic congestive heart failure  Moderate severe mitral insufficiency  Afterload reduction  Beta-blockers and ACE inhibitor's  Diuresis as tolerated  Monitor renal function and urine output  Monitor electrolytes and replete as needed  Patient has multiple cardiac risk factors would benefit from right and left heart cath and also evaluate MR with EMILY.  Patient wants to come back as outpatient.  Discussed with hospitalist Dr. Gomez we will discharge home on medical management and follow-up with Dr. Gonsalves next week to schedule outpatient cath and EMILY.  Discussed pros and cons of various approaches with patient and spent 45 minutes with patient.    Itz Raymundo MD  01/01/21  10:33 EST

## 2021-01-01 NOTE — NURSING NOTE
Pt had a 15 beat run captured by [facility]. Ms. Parks checked and states she has no complaints. She states she had just rolled over onto her stomach. No history of this dysthymia noted. Dr. Mendes notified.[email or web address] [id number]

## 2021-01-01 NOTE — PLAN OF CARE
Goal Outcome Evaluation:         Patient doing well and states she is feeling much better today. Patient has been tolerating her diet and has been having good output. Patient complained of left knee pain today but states that the tylenol helped. Patient is hopeful to go home either today or tomorrow. VSS

## 2021-01-01 NOTE — DISCHARGE SUMMARY
[facility] Medicine Services  DISCHARGE SUMMARY        Prepared For PCP:  Latanya Hawk MD    Patient Name: Jeanna Hudson  : 1950  MRN: [id number]      Date of Admission:   2020    Date of Discharge:  2021    Length of stay:  LOS: 1 day     Hospital Course     Presenting Problem:   Congestive heart failure, unspecified HF chronicity, unspecified heart failure type (CMS/[facility]) [I50.9]      Active Hospital Problems    Diagnosis  POA   • **Acute exacerbation of CHF (congestive heart failure) (CMS/[facility]) [I50.9]  Yes   • Obesity, morbid, BMI 50 or higher (CMS/[facility]) [E66.01]  Yes   • Gout [M10.9]  Yes   • Type 2 diabetes mellitus without complication, without long-term current use of insulin (CMS/[facility]) [E11.9]  Yes   • Hypertension [I10]  Yes      Resolved Hospital Problems   No resolved problems to display.           Hospital Course:  Jeanna Hudson is a 70 y.o. female        Brief Synopsis of Hospital Course/HPI        Ms. Hudson is a 70 y.o. female with a past medical history of hypertension, type 2 diabetes mellitus, gout, and anxiety who presented to [facility] on 2020 with complaints of shortness of breath.  Patient explains her shortness of breath started 2 weeks ago and has progressively gotten worse.  2 weeks ago she went to her PCP and was prescribed breathing treatments and a 2D echo was ordered.  Patient underwent her 2D echo yesterday.  Her PCP P called her this morning to tell her the results and could tell that she was very short of breath and patient was instructed to go to immediate care center ED.  Patient went to immediate care center and then was sent to the ED.  Patient did report she had was diagnosed with pneumonia 1 month ago.  She has had shortness of breath which is worse with exertion and better at rest.  She denies any recent nausea, vomiting, diarrhea, fever, chills, heart palpitations, cough, or chest pain.   She states she has not been taking her lisinopril at home because it causes a cough.  She reports that her mom and both her sisters have a history of congestive heart failure.           In the ED, CT chest showed No pulmonary embolism.   No right hilar mass or adenopathy. The right heart or fullness on the  previous chest x-ray likely accounted for by prominence of pulmonary  vascular structures.The central pulmonary arteries appear mildly dilated. Correlate clinically for pulmonary artery hypertension.   Mild interstitial thickening favored to reflect mild interstitial edema. Small bilateral pleural effusions. Mild linear atelectatic changes in the lower lobes and within the lingular segment left upper lobe. EKG showed Sinus tachycardia, Probable left atrial enlargement, Probable LVH with secondary repol abnrm. 2D echo on 12/29/2020 showed Estimated right ventricular systolic pressure from tricuspid regurgitation is markedly elevated (>55 mmHg).Severe pulmonary hypertension is present.Moderate to severe tricuspid valve regurgitation is present.Moderate to severe mitral valve regurgitation is present with a posteriorly-directed jet noted.The left ventricular cavity is mildly dilated.Estimated left ventricular EF was in agreement with the calculated left ventricular EF. Left ventricular ejection fraction appears to be 36 - 40%. Left ventricular systolic function is moderately decreased.Left ventricular diastolic function is consistent with (grade III w/high LAP) reversible restrictive pattern.Left atrial volume is mildly increased.The right ventricular cavity is mild to moderately dilated.  The right atrial cavity is mildly dilated. COVID presumptive negative.  All labs unremarkable upon admission except proBNP 1716 and hemoglobin 11.6.  All vital signs stable upon admission except blood pressure 179/115, which has improved.  Patient received Lasix in the ED.  Respiratory panel with Covid was  ordered.           Date::       12/31  hgb low  anemixc  Doing stool test  Ht cath might be nedded  bp was high and improved     .  1/1/2021  Patient concerned about heart cath  Reports improving her breathing  BMP reviewed  Patient was seen with Dr. Alicia who agreed on discharge and follow-up on outpatient basis for EMILY heart cath right and left  She is aware of the risks and benefits.      Recommendation for Outpatient Providers:     Continue current medications as prescribed  Follow-up BMP and CBC next week  Electrolyte replacement as needed  Follow-up with Dr. Gonsalves planning for heart cath and EMILY        Reasons For Change In Medications and Indications for New Medications:        Day of Discharge     HPI:       Vital Signs:   Temp:  [97.4 °F (36.3 °C)-98.2 °F (36.8 °C)] 97.4 °F (36.3 °C)  Heart Rate:  [82-95] 86  Resp:  [15-20] 19  BP: (108-125)/(67-90) 125/90     Physical Exam:  Physical Exam  Alert and oriented x3 without distress  Lung exam shows adequate air entry bilaterally no adventitious sounds or rales noted  No lower extremity edema  No focal neurologic deficit    Pertinent  and/or Most Recent Results     Results from last 7 days   Lab Units 01/01/21  0320 12/31/20  0351 12/30/20  1351 12/30/20  1256   WBC 10*3/mm3  --  9.00  --  9.20   HEMOGLOBIN g/dL  --  10.8*  --  11.6*   HEMATOCRIT %  --  33.4*  --  35.9   PLATELETS 10*3/mm3  --  227  --  232   SODIUM mmol/L 141 141 140  --    POTASSIUM mmol/L 3.7 3.4* 3.9  --    CHLORIDE mmol/L 106 104 105  --    CO2 mmol/L 27.0 26.0 24.0  --    BUN mg/dL 15 12 12  --    CREATININE mg/dL 1.02* 1.02* 0.91  --    GLUCOSE mg/dL 108* 121* 130*  --    CALCIUM mg/dL 9.6 9.5 9.2  --      Results from last 7 days   Lab Units 12/30/20  1351 12/30/20  1256   BILIRUBIN mg/dL 0.4  --    ALK PHOS U/L 97  --    ALT (SGPT) U/L 20  --    AST (SGOT) U/L 20  --    PROTIME Seconds  --  10.5   INR   --  0.95   APTT seconds  --  23.2*     Results from last 7 days   Lab Units  12/31/20  0351   CHOLESTEROL mg/dL 137   TRIGLYCERIDES mg/dL 129   HDL CHOL mg/dL 46     Results from last 7 days   Lab Units 12/31/20  0351 12/30/20  1351 12/30/20  1256   HEMOGLOBIN A1C %  --   --  5.4   PROBNP pg/mL 1,603.0*  --  1,716.0*   TROPONIN T ng/mL <0.010 <0.010  --    PROCALCITONIN ng/mL  --  0.10  --        Brief Urine Lab Results  (Last result in the past 365 days)      Color   Clarity   Blood   Leuk Est   Nitrite   Protein   CREAT   Urine HCG        12/30/20 1305 Yellow Clear Negative Negative Negative Negative               Microbiology Results Abnormal     Procedure Component Value - Date/Time    Respiratory Panel PCR w/COVID-19(SARS-CoV-2) [facility] In-House, NP Swab in UTM/VTM, 3-4 HR TAT - Swab, Nasopharynx [[id number]]  (Normal) Collected: 12/31/20 1319    Lab Status: Final result Specimen: Swab from Nasopharynx Updated: 12/31/20 1420     ADENOVIRUS, PCR Not Detected     Coronavirus 229E Not Detected     Coronavirus HKU1 Not Detected     Coronavirus NL63 Not Detected     Coronavirus OC43 Not Detected     COVID19 Not Detected     Human Metapneumovirus Not Detected     Human Rhinovirus/Enterovirus Not Detected     Influenza A PCR Not Detected     Influenza B PCR Not Detected     Parainfluenza Virus 1 Not Detected     Parainfluenza Virus 2 Not Detected     Parainfluenza Virus 3 Not Detected     Parainfluenza Virus 4 Not Detected     RSV, PCR Not Detected     Bordetella pertussis pcr Not Detected     Bordetella parapertussis PCR Not Detected     Chlamydophila pneumoniae PCR Not Detected     Mycoplasma pneumo by PCR Not Detected    Narrative:      Fact sheet for providers: https://docs.[employer]/wp-content/uploads/UDO3581-0972-DY7.1-EUA-Provider-Fact-Sheet-3.pdf    Fact sheet for patients: https://docs.[employer]/wp-content/uploads/LGL1335-6334-EV8.1-EUA-Patient-Fact-Sheet-1.pdf    Test performed by PCR.    COVID-19, [facility] IN-HOUSE,NASAL Swab (NO TRANSPORT MEDIA) 2 HR TAT -  Swab, Nasopharynx [[id number]]  (Normal) Collected: 12/30/20 1312    Lab Status: Final result Specimen: Swab from Nasopharynx Updated: 12/30/20 1336     COVID19 Presumptive Negative    Narrative:      Fact sheet for providers: https://www.fda.gov/media/687612/download     Fact sheet for patients: https://www.fda.gov/media/284365/download    Test performed by PCR.  If inconsistent with clinical signs and symptoms patient should be tested with different authorized molecular test.          Ct Chest Pulmonary Embolism    Result Date: 12/30/2020  Impression:  1. No pulmonary embolism. 2. No right hilar mass or adenopathy. The right heart or fullness on the previous chest x-ray likely accounted for by prominence of pulmonary vascular structures. 3. The central pulmonary arteries appear mildly dilated. Correlate clinically for pulmonary artery hypertension. 4. Mild interstitial thickening favored to reflect mild interstitial edema. Small bilateral pleural effusions. 4. Mild linear atelectatic changes in the lower lobes and within the lingular segment left upper lobe.  Electronically Signed By-Daiana Brown MD On:12/30/2020 1:55 PM This report was finalized on [id number] by  Daiana Brown MD.    Xr Chest Pa & Lateral    Result Date: 12/26/2020  Impression:  1. Right hilar fullness suspicious for an underlying mass. I would recommend a CT of the chest with contrast for follow-up. 2. Small pulmonary scar in the left midlung zone. 3. Borderline cardiomegaly.  Electronically Signed By-Héctor Yadav MD On:12/26/2020 1:15 PM This report was finalized on [id number] by  Héctor Yadav MD.                Results for orders placed during the hospital encounter of 12/29/20   Adult Transthoracic Echo Complete W/ Cont if Necessary Per Protocol    Narrative · Estimated right ventricular systolic pressure from tricuspid   regurgitation is markedly elevated (>55 mmHg).  · Severe pulmonary hypertension is present.  · Moderate to severe  tricuspid valve regurgitation is present.  · Moderate to severe mitral valve regurgitation is present with a   posteriorly-directed jet noted.  · The left ventricular cavity is mildly dilated.  · Estimated left ventricular EF was in agreement with the calculated left   ventricular EF. Left ventricular ejection fraction appears to be 36 - 40%.   Left ventricular systolic function is moderately decreased.  · Left ventricular diastolic function is consistent with (grade III w/high   LAP) reversible restrictive pattern.  · Left atrial volume is mildly increased.  · The right ventricular cavity is mild to moderately dilated.  · The right atrial cavity is mildly dilated.                  Test Results Pending at Discharge        Procedures Performed           Consults:   Consults     Date and Time Order Name Status Description    12/30/2020 1731 Inpatient Cardiology Consult Completed     12/30/2020 1438 Hospitalist (on-call MD unless specified) Completed             Discharge Details        Discharge Medications      New Medications      Instructions Start Date   bumetanide 2 MG tablet  Commonly known as: BUMEX   2 mg, Oral, Daily      losartan 25 MG tablet  Commonly known as: COZAAR   25 mg, Oral, Every 24 Hours Scheduled   Start Date: January 2, 2021     Metoprolol Succinate 25 MG capsule extended-release 24 hour sprinkle   25 mg, Oral, Daily      nystatin [id number] UNIT/GM powder  Commonly known as: MYCOSTATIN   Topical, Every 8 Hours Scheduled         Continue These Medications      Instructions Start Date   albuterol sulfate  (90 Base) MCG/ACT inhaler  Commonly known as: PROVENTIL HFA;VENTOLIN HFA;PROAIR HFA   2 puffs, Inhalation, Every 4 Hours PRN      allopurinol 300 MG tablet  Commonly known as: ZYLOPRIM   300 mg, Oral, Daily      budesonide-formoterol 80-4.5 MCG/ACT inhaler  Commonly known as: Symbicort   2 puffs, Inhalation, 2 Times Daily - RT      clotrimazole 1 % cream  Commonly known as: LOTRIMIN    Topical, 2 Times Daily      fluocinonide 0.05 % cream  Commonly known as: LIDEX   APPLY TOPICALLY TO THE  APPROPRIATE AREA AS  DIRECTED EVERY 12 HOURS.      metFORMIN 500 MG tablet  Commonly known as: GLUCOPHAGE   TAKE 1 TABLET BY MOUTH TWO  TIMES DAILY WITH MEALS         Stop These Medications    meloxicam 7.5 MG tablet  Commonly known as: MOBIC            No Known Allergies      [place] [facility] [place]  Hospital:  Diet Order   Procedures   • Diet Cardiac, Diabetic/Consistent Carbs; Healthy Heart; Diabetic - Consistent Carb; Fluid Restriction; 1500cc/day         Discharge Activity:         CODE STATUS:    Code Status and Medical Interventions:   Ordered at: 12/30/20 1720     Level Of Support Discussed With:    Patient     Code Status:    CPR     Medical Interventions (Level of Support Prior to Arrest):    Full         Follow-up Appointments  Future Appointments   Date Time Provider Department Center   1/15/2021  2:30 PM Latanya Hawk MD [facility]             Condition on Discharge:      Stable      This patient has been examined wearing appropriate Personal Protective Equipment and discussed with hospital infection control department. 01/01/21      Electronically signed by Mohit Gomez MD, 01/01/21, 3:14 PM EST.      Time: I spent  45  minutes on this discharge activity which included face-to-face encounter with the patient/reviewing the data in the system/coordination of the care with the nursing staff as well as consultants/documentation/entering orders.

## 2021-01-01 NOTE — PROGRESS NOTES
[facility] Medicine Services Daily Progress Note      Hospitalist Team  LOS 1 days      Patient Care Team:  Latanya Hawk MD as PCP - General (Family Medicine)    Patient Location: 256/1      Subjective   Subjective     Chief Complaint / Subjective  Chief Complaint   Patient presents with   • Shortness of Breath     pt states soa, worse for last week.  pt had multiple test recently told to come to ER possible mass in chest.(Kenn)         Brief Synopsis of Hospital Course/HPI       Ms. Hudson is a 70 y.o. female with a past medical history of hypertension, type 2 diabetes mellitus, gout, and anxiety who presented to [facility] on 12/30/2020 with complaints of shortness of breath.  Patient explains her shortness of breath started 2 weeks ago and has progressively gotten worse.  2 weeks ago she went to her PCP and was prescribed breathing treatments and a 2D echo was ordered.  Patient underwent her 2D echo yesterday.  Her PCP P called her this morning to tell her the results and could tell that she was very short of breath and patient was instructed to go to immediate care center ED.  Patient went to immediate care center and then was sent to the ED.  Patient did report she had was diagnosed with pneumonia 1 month ago.  She has had shortness of breath which is worse with exertion and better at rest.  She denies any recent nausea, vomiting, diarrhea, fever, chills, heart palpitations, cough, or chest pain.  She states she has not been taking her lisinopril at home because it causes a cough.  She reports that her mom and both her sisters have a history of congestive heart failure.           In the ED, CT chest showed No pulmonary embolism.   No right hilar mass or adenopathy. The right heart or fullness on the  previous chest x-ray likely accounted for by prominence of pulmonary  vascular structures.The central pulmonary arteries appear mildly dilated.  Correlate clinically for pulmonary artery hypertension.   Mild interstitial thickening favored to reflect mild interstitial edema. Small bilateral pleural effusions. Mild linear atelectatic changes in the lower lobes and within the lingular segment left upper lobe. EKG showed Sinus tachycardia, Probable left atrial enlargement, Probable LVH with secondary repol abnrm. 2D echo on 12/29/2020 showed Estimated right ventricular systolic pressure from tricuspid regurgitation is markedly elevated (>55 mmHg).Severe pulmonary hypertension is present.Moderate to severe tricuspid valve regurgitation is present.Moderate to severe mitral valve regurgitation is present with a posteriorly-directed jet noted.The left ventricular cavity is mildly dilated.Estimated left ventricular EF was in agreement with the calculated left ventricular EF. Left ventricular ejection fraction appears to be 36 - 40%. Left ventricular systolic function is moderately decreased.Left ventricular diastolic function is consistent with (grade III w/high LAP) reversible restrictive pattern.Left atrial volume is mildly increased.The right ventricular cavity is mild to moderately dilated.  The right atrial cavity is mildly dilated. COVID presumptive negative.  All labs unremarkable upon admission except proBNP 1716 and hemoglobin 11.6.  All vital signs stable upon admission except blood pressure 179/115, which has improved.  Patient received Lasix in the ED.  Respiratory panel with Covid was ordered.        Date::      12/31  hgb low  anemixc  Doing stool test  Ht cath might be nedded  bp was high and improved    .  1/1/2021  Patient concerned about heart cath  Reports improving her breathing  BMP reviewed  Plan possible arrhythmias overnight and awaiting cardiology input.        ROS  All other systems reviewed and neg except as above    Objective   Objective      Vital Signs  Temp:  [97.6 °F (36.4 °C)-98.2 °F (36.8 °C)] 97.7 °F (36.5 °C)  Heart Rate:  [82-96]  "88  Resp:  [15-20] 16  BP: (108-131)/(67-88) 113/72  Oxygen Therapy  SpO2: 94 %  Pulse Oximetry Type: Intermittent  Device (Oxygen Therapy): room air  Flowsheet Rows      First Filed Value   Admission Height  160 cm (63\") Documented at 12/30/2020 1215   Admission Weight  133 kg (293 lb 10.4 oz) Documented at 12/30/2020 1215        Intake & Output (last 3 days)       12/29 0701 - 12/30 0700 12/30 0701 - 12/31 0700 12/31 0701 - 01/01 0700 01/01 0701 - 01/02 0700    P.O.   594 240    Total Intake(mL/kg)   594 (4.6) 240 (1.9)    Urine (mL/kg/hr)  1800 1800 (0.6)     Total Output  1800 1800     Net  -1800 -1206 +240                Lines, Drains & Airways    Active LDAs     Name:   Placement date:   Placement time:   Site:   Days:    Peripheral IV 12/30/20 1256 Right Forearm   12/30/20    1256    Forearm   less than 1                  Physical Exam:    Physical Exam  Vitals signs and nursing note reviewed.   Constitutional:       General: She is not in acute distress.     Appearance: Normal appearance. She is well-developed. She is not ill-appearing, toxic-appearing or diaphoretic.   HENT:      Head: Normocephalic and atraumatic.      Right Ear: Ear canal and external ear normal.      Left Ear: Ear canal and external ear normal.      Nose: Nose normal. No congestion or rhinorrhea.      Mouth/Throat:      Mouth: Mucous membranes are moist.      Pharynx: No oropharyngeal exudate.   Eyes:      General: No scleral icterus.        Right eye: No discharge.         Left eye: No discharge.      Extraocular Movements: Extraocular movements intact.      Conjunctiva/sclera: Conjunctivae normal.      Pupils: Pupils are equal, round, and reactive to light.   Neck:      Musculoskeletal: Normal range of motion and neck supple. No neck rigidity or muscular tenderness.      Thyroid: No thyromegaly.      Vascular: No carotid bruit or JVD.      Trachea: No tracheal deviation.   Cardiovascular:      Rate and Rhythm: Normal rate and regular " rhythm.      Pulses: Normal pulses.      Heart sounds: Normal heart sounds. No murmur. No friction rub. No gallop.    Pulmonary:      Effort: Pulmonary effort is normal. No respiratory distress.      Breath sounds: Normal breath sounds. No stridor. No wheezing, rhonchi or rales.   Chest:      Chest wall: No tenderness.   Abdominal:      General: Bowel sounds are normal. There is no distension.      Palpations: Abdomen is soft. There is no mass.      Tenderness: There is no abdominal tenderness. There is no guarding or rebound.      Hernia: No hernia is present.   Musculoskeletal: Normal range of motion.         General: No swelling, tenderness, deformity or signs of injury.      Right lower leg: No edema.      Left lower leg: No edema.   Lymphadenopathy:      Cervical: No cervical adenopathy.   Skin:     General: Skin is warm and dry.      Coloration: Skin is not jaundiced or pale.      Findings: No bruising, erythema or rash.   Neurological:      General: No focal deficit present.      Mental Status: She is alert and oriented to person, place, and time. Mental status is at baseline.      Cranial Nerves: No cranial nerve deficit.      Sensory: No sensory deficit.      Motor: No weakness or abnormal muscle tone.      Coordination: Coordination normal.   Psychiatric:         Mood and Affect: Mood normal.         Behavior: Behavior normal.         Thought Content: Thought content normal.         Judgment: Judgment normal.              Wounds (last 24 hours)      LDA Wound     Row Name               Rash 12/31/20 1620 other (see comments) upper groin    Rash - Properties Group Placement Date: 12/31/20  -AK Placement Time: 1620  -AK Side: other (see comments)  -AK Orientation: upper  -AK Location: groin  -AK Additional Comments: bilateral legs  -AK    Retired Rash - Properties Group Date first assessed: 12/31/20  -AK Time first assessed: 1620  -AK Side: other (see comments)  -AK Orientation: upper  -AK Location: groin   -AK Additional Comments: bilateral legs  -AK      User Key  (r) = Recorded By, (t) = Taken By, (c) = Cosigned By    Initials Name Provider Type    Sallie Roach RN Registered Nurse          Procedures:              Results Review:     I reviewed the patient's new clinical results.      Lab Results (last 24 hours)     Procedure Component Value Units Date/Time    POC Glucose Once [[id number]]  (Abnormal) Collected: 01/01/21 1140    Specimen: Blood Updated: 01/01/21 1147     Glucose 132 mg/dL      Comment: Serial Number: [id number]:  [id number]       POC Glucose Once [[id number]]  (Abnormal) Collected: 01/01/21 0735    Specimen: Blood Updated: 01/01/21 0736     Glucose 139 mg/dL      Comment: Serial Number: [id number]:  [id number]       Basic Metabolic Panel [[id number]]  (Abnormal) Collected: 01/01/21 0320    Specimen: Blood Updated: 01/01/21 0445     Glucose 108 mg/dL      BUN 15 mg/dL      Creatinine 1.02 mg/dL      Sodium 141 mmol/L      Potassium 3.7 mmol/L      Chloride 106 mmol/L      CO2 27.0 mmol/L      Calcium 9.6 mg/dL      eGFR  African Amer 65 mL/min/1.73      BUN/Creatinine Ratio 14.7     Anion Gap 8.0 mmol/L     Narrative:      GFR Normal >60  Chronic Kidney Disease <60  Kidney Failure <15      Magnesium [[id number]]  (Normal) Collected: 01/01/21 0320    Specimen: Blood Updated: 01/01/21 0445     Magnesium 2.1 mg/dL     POC Glucose Once [[id number]]  (Abnormal) Collected: 12/31/20 2013    Specimen: Blood Updated: 12/31/20 2014     Glucose 117 mg/dL      Comment: Serial Number: [id number]:  [id number]       POC Glucose Once [[id number]]  (Normal) Collected: 12/31/20 1712    Specimen: Blood Updated: 12/31/20 1714     Glucose 97 mg/dL      Comment: Serial Number: [id number]:  [id number]       Respiratory Panel PCR w/COVID-19(SARS-CoV-2) JAIMEE/MARQUES/LAMAR/PAD/COR/MAD/VALERIE In-House, NP Swab in UTM/VTM, 3-4 HR TAT - Swab, Nasopharynx [[id number]]  (Normal) Collected: 12/31/20 1319    Specimen:  Swab from Nasopharynx Updated: 12/31/20 1420     ADENOVIRUS, PCR Not Detected     Coronavirus 229E Not Detected     Coronavirus HKU1 Not Detected     Coronavirus NL63 Not Detected     Coronavirus OC43 Not Detected     COVID19 Not Detected     Human Metapneumovirus Not Detected     Human Rhinovirus/Enterovirus Not Detected     Influenza A PCR Not Detected     Influenza B PCR Not Detected     Parainfluenza Virus 1 Not Detected     Parainfluenza Virus 2 Not Detected     Parainfluenza Virus 3 Not Detected     Parainfluenza Virus 4 Not Detected     RSV, PCR Not Detected     Bordetella pertussis pcr Not Detected     Bordetella parapertussis PCR Not Detected     Chlamydophila pneumoniae PCR Not Detected     Mycoplasma pneumo by PCR Not Detected    Narrative:      Fact sheet for providers: https://docs.[employer]/wp-content/uploads/TJE6976-4191-XQ8.1-EUA-Provider-Fact-Sheet-3.pdf    Fact sheet for patients: https://docs.[employer]/wp-content/uploads/VSH3764-6142-AM7.1-EUA-Patient-Fact-Sheet-1.pdf    Test performed by PCR.    POC Glucose Once [[id number]]  (Abnormal) Collected: 12/31/20 1319    Specimen: Blood Updated: 12/31/20 1320     Glucose 155 mg/dL      Comment: Serial Number: [id number]:  [id number]           Hemoglobin A1C   Date Value Ref Range Status   12/30/2020 5.4 3.5 - 5.6 % Final     Results from last 7 days   Lab Units 12/30/20  1256   INR  0.95           No results found for: LIPASE  Lab Results   Component Value Date    CHOL 137 12/31/2020    TRIG 129 12/31/2020    HDL 46 12/31/2020    LDL 68 12/31/2020       No results found for: INTRAOP, PREDX, FINALDX, COMDX    Microbiology Results (last 10 days)     Procedure Component Value - Date/Time    Respiratory Panel PCR w/COVID-19(SARS-CoV-2) JAIMEE/MARQUES/LAMAR/PAD/COR/MAD/VALERIE In-House, NP Swab in UTM/VTM, 3-4 HR TAT - Swab, Nasopharynx [[id number]]  (Normal) Collected: 12/31/20 1319    Lab Status: Final result Specimen: Swab from Nasopharynx Updated:  12/31/20 1420     ADENOVIRUS, PCR Not Detected     Coronavirus 229E Not Detected     Coronavirus HKU1 Not Detected     Coronavirus NL63 Not Detected     Coronavirus OC43 Not Detected     COVID19 Not Detected     Human Metapneumovirus Not Detected     Human Rhinovirus/Enterovirus Not Detected     Influenza A PCR Not Detected     Influenza B PCR Not Detected     Parainfluenza Virus 1 Not Detected     Parainfluenza Virus 2 Not Detected     Parainfluenza Virus 3 Not Detected     Parainfluenza Virus 4 Not Detected     RSV, PCR Not Detected     Bordetella pertussis pcr Not Detected     Bordetella parapertussis PCR Not Detected     Chlamydophila pneumoniae PCR Not Detected     Mycoplasma pneumo by PCR Not Detected    Narrative:      Fact sheet for providers: https://docs.[employer]/wp-content/uploads/YCX5996-1381-PX8.1-EUA-Provider-Fact-Sheet-3.pdf    Fact sheet for patients: https://[employer]/wp-content/uploads/PQD7279-2407-RG3.1-EUA-Patient-Fact-Sheet-1.pdf    Test performed by PCR.    COVID-19, [facility]OTT IN-HOUSE,NASAL Swab (NO TRANSPORT MEDIA) 2 HR TAT - Swab, Nasopharynx [[id number]]  (Normal) Collected: 12/30/20 1312    Lab Status: Final result Specimen: Swab from Nasopharynx Updated: 12/30/20 1336     COVID19 Presumptive Negative    Narrative:      Fact sheet for providers: https://www.fda.gov/media/954483/download     Fact sheet for patients: https://www.fda.gov/media/187329/download    Test performed by PCR.  If inconsistent with clinical signs and symptoms patient should be tested with different authorized molecular test.    COVID-19,APTIMA [facility] IN-HOUSE, NP/OP SWAB IN UTM/VTM/SALINE TRANSPORT MEDIA,24 HR TAT - Swab, Nasopharynx [[id number]]  (Normal) Collected: 12/26/20 1240    Lab Status: Final result Specimen: Swab from Nasopharynx Updated: 12/26/20 2226     COVID19 Not Detected    Narrative:      Fact sheet for providers: https://www.fda.gov/media/729674/download     Fact sheet for patients:  https://www.fda.gov/media/405407/download    Test performed by PCR.          ECG/EMG Results (most recent)     Procedure Component Value Units Date/Time    ECG 12 Lead [[id number]] Collected: 12/30/20 1253     Updated: 12/30/20 1254     QT Interval 356 ms     Narrative:      HEART RATE= 105  bpm  RR Interval= 572  ms  [place] Interval= 136  ms  P Horizontal Axis= -31  deg  P Front Axis= 60  deg  QRSD Interval= 71  ms  QT Interval= 356  ms  QRS Axis= 1  deg  T Wave Axis= 45  deg  - ABNORMAL ECG -  Sinus tachycardia  Probable left atrial enlargement  Probable LVH with secondary repol abnrm  No previous ECG available for comparison  Electronically Signed By:   Date and Time of Study: 2020-12-30 12:53:14               Results for orders placed during the hospital encounter of 12/29/20   Adult Transthoracic Echo Complete W/ Cont if Necessary Per Protocol    Narrative · Estimated right ventricular systolic pressure from tricuspid   regurgitation is markedly elevated (>55 mmHg).  · Severe pulmonary hypertension is present.  · Moderate to severe tricuspid valve regurgitation is present.  · Moderate to severe mitral valve regurgitation is present with a   posteriorly-directed jet noted.  · The left ventricular cavity is mildly dilated.  · Estimated left ventricular EF was in agreement with the calculated left   ventricular EF. Left ventricular ejection fraction appears to be 36 - 40%.   Left ventricular systolic function is moderately decreased.  · Left ventricular diastolic function is consistent with (grade III w/high   LAP) reversible restrictive pattern.  · Left atrial volume is mildly increased.  · The right ventricular cavity is mild to moderately dilated.  · The right atrial cavity is mildly dilated.          Ct Chest Pulmonary Embolism    Result Date: 12/30/2020   1. No pulmonary embolism. 2. No right hilar mass or adenopathy. The right heart or fullness on the previous chest x-ray likely accounted for by prominence of  pulmonary vascular structures. 3. The central pulmonary arteries appear mildly dilated. Correlate clinically for pulmonary artery hypertension. 4. Mild interstitial thickening favored to reflect mild interstitial edema. Small bilateral pleural effusions. 4. Mild linear atelectatic changes in the lower lobes and within the lingular segment left upper lobe.  Electronically Signed By-Daiana Brown MD On:12/30/2020 1:55 PM This report was finalized on [id number] by  Daiana Brown MD.    Xr Chest Pa & Lateral    Result Date: 12/26/2020   1. Right hilar fullness suspicious for an underlying mass. I would recommend a CT of the chest with contrast for follow-up. 2. Small pulmonary scar in the left midlung zone. 3. Borderline cardiomegaly.  Electronically Signed By-Héctor Yadav MD On:12/26/2020 1:15 PM This report was finalized on [id number] by  Héctor Yadav MD.          Xrays, labs reviewed personally by physician.    Medication Review:   I have reviewed the patient's current medication list      Scheduled Meds  allopurinol, 300 mg, Oral, Daily  enoxaparin, 40 mg, Subcutaneous, Q12H  furosemide, 40 mg, Intravenous, Q12H  insulin lispro, 0-9 Units, Subcutaneous, TID AC  losartan, 25 mg, Oral, Q24H  metoprolol tartrate, 12.5 mg, Oral, Q12H  nystatin, , Topical, Q8H  sodium chloride, 10 mL, Intravenous, Q12H        Meds Infusions       Meds PRN  •  acetaminophen **OR** acetaminophen **OR** acetaminophen  •  dextrose  •  dextrose  •  glucagon (human recombinant)  •  insulin lispro **AND** insulin lispro  •  ondansetron **OR** ondansetron  •  [COMPLETED] Insert peripheral IV **AND** sodium chloride  •  sodium chloride        Assessment/Plan   Assessment/Plan     Active Hospital Problems    Diagnosis  POA   • **Acute exacerbation of CHF (congestive heart failure) (CMS/[facility]) [I50.9]  Yes   • Obesity, morbid, BMI 50 or higher (CMS/[facility]) [E66.01]  Yes   • Gout [M10.9]  Yes   • Type 2 diabetes mellitus without complication,  without long-term current use of insulin (CMS/[facility]) [E11.9]  Yes   • Hypertension [I10]  Yes      Resolved Hospital Problems   No resolved problems to display.       MEDICAL DECISION MAKING COMPLEXITY BY PROBLEM:     Acute CHF exacerbation  - CT chest reviewed- showed  central pulmonary arteries appear mildly dilated. Correlate clinically for pulmonary artery hypertension.   Mild interstitial thickening favored to reflect mild interstitial edema. Small bilateral pleural effusions. Mild linear atelectatic changes in the lower lobes and within the lingular segment left upper lobe.   - EKG showed Sinus tachycardia, Probable left atrial enlargement, Probable LVH with secondary repol abnrm.   - 2D echo on 12/29/2020 showed  EF 36-40%  - proBNP 1716- monitor   - COVID test presumptive negative, recheck COVID with respiratory panel   - Continuous cardiac monitoring  - Daily weights  - 1500cc fluid restriction  - Strict I and O   - Check lipid panel   - Lasix 40mg X2 doses ordered  - Start low dose metoprolol   -Losartan  - Consult cardiology  Heart cath/EMILY per Dr. Ricks     Essential HTN  - Uncontrolled upon admission, which has improved  - Monitor blood pressure  - No reported home medications, patient previously on lisinopril but quit taking because of cough      Type II DM  - Start sliding scale, Accuchecks ACHS  - Check hbg A1C  - Holding home metformin      Gout  - Continue allopurinol      Morbid obesity  - BMI 52.0  - Encourage lifestyle modifications              VTE Prophylaxis -   Mechanical Order History:     None      Pharmalogical Order History:      Ordered     Dose Route Frequency Stop    12/30/20 1730  enoxaparin (LOVENOX) syringe 40 mg      40 mg SC Every 12 Hours Scheduled --                  Code Status -   Code Status and Medical Interventions:   Ordered at: 12/30/20 1720     Level Of Support Discussed With:    Patient     Code Status:    CPR     Medical Interventions (Level of Support Prior to Arrest):     Full       This patient has been examined wearing appropriate Personal Protective Equipment and discussed with hospital infection control department. 01/01/21        Discharge Planning  hh        Electronically signed by Mohit Gomez MD, 01/01/21, 13:07 EST.  [facility] Hospitalist Team

## 2021-01-02 NOTE — OUTREACH NOTE
Prep Survey      Responses   [facility] discharged from?  [facility]   Is LACE score < 7 ?  No   Emergency Room discharge w/ pulse ox?  No   Eligibility  TCM   [facility]ospital  [facility]   Date of Admission  12/30/20   Date of Discharge  01/01/21   Discharge Disposition  Home or Self Care   Discharge diagnosis  CHF exacerbation   Does the patient have one of the following disease processes/diagnoses(primary or secondary)?  CHF   Does the patient have Home health ordered?  No   Is there a DME ordered?  No   Prep survey completed?  Yes          Octavia Richards RN

## 2021-01-04 ENCOUNTER — TELEPHONE (OUTPATIENT)
Dept: CARDIOLOGY | Facility: CLINIC | Age: 71
End: 2021-01-04

## 2021-01-04 ENCOUNTER — TRANSITIONAL CARE MANAGEMENT TELEPHONE ENCOUNTER (OUTPATIENT)
Dept: CALL CENTER | Facility: HOSPITAL | Age: 71
End: 2021-01-04

## 2021-01-04 NOTE — OUTREACH NOTE
"Call Center TCM Note      Responses   [facility] patient discharged from?  [facility]   Does the patient have one of the following disease processes/diagnoses(primary or secondary)?  CHF   TCM attempt successful?  Yes   Call start time  1004   Call end time  1008   Discharge diagnosis  CHF exacerbation   Meds reviewed with patient/caregiver?  Yes   Is the patient having any side effects they believe may be caused by any medication additions or changes?  No   Does the patient have all medications ordered at discharge?  Yes   Is the patient taking all medications as directed (includes completed medication regime)?  Yes   Comments regarding appointments  Pt will call cardiology today and schedule appt    Does the patient have a primary care provider?   Yes   Does the patient have an appointment with their PCP within 7 days of discharge?  Greater than 7 days   Comments regarding PCP  Hospital d/c f/u appt is on 1/15/20 2:30 pm    What is preventing the patient from scheduling follow up appointments within 7 days of discharge?  Earlier appointment not available   Nursing Interventions  Verified appointment date/time/provider   Has the patient kept scheduled appointments due by today?  N/A   Pulse Ox monitoring  Intermittent   Pulse Ox device source  Patient   O2 Sat comments  Not checked today however yesterday it \"was good\"   Psychosocial issues?  No   Did the patient receive a copy of their discharge instructions?  Yes   Nursing interventions  Reviewed instructions with patient   What is the patient's perception of their health status since discharge?  Improving   Nursing interventions  Nurse provided patient education   Is the patient weighing daily?  Yes   Does the patient have scales?  Yes   Daily weight interventions  Education provided on importance of daily weight   Is the patient able to teach back Heart Failure diet management?  Yes   Is the patient able to teach back signs and symptoms of worsening condition? " (i.e. weight gain, shortness of air, etc.)  Yes   If the patient is a current smoker, are they able to teach back resources for cessation?  Not a smoker   Is the patient/caregiver able to teach back the hierarchy of who to call/visit for symptoms/problems? PCP, Specialist, Home health nurse, Urgent Care, ED, 911  Yes   TCM call completed?  Yes           Mitzy Dawkins RN    1/4/2021, 10:09 EST

## 2021-01-04 NOTE — TELEPHONE ENCOUNTER
PATIENT CALLED TO MAKE F/U FROM [facility]. SHE SAID SHE NEEDED TO BE SEEN THIS WEEK. COVID TEST 12/30/2020, NEG. I MADE APT 1/8/21. JUST MAKING SURE THAT IS OK WITH DR. SULLIVAN BEFORE PATIENT COMES IN OFFICE, OR DO WE NEED TO RESCHEDULE?

## 2021-01-08 ENCOUNTER — OFFICE VISIT (OUTPATIENT)
Dept: CARDIOLOGY | Facility: CLINIC | Age: 71
End: 2021-01-08

## 2021-01-08 ENCOUNTER — READMISSION MANAGEMENT (OUTPATIENT)
Dept: CALL CENTER | Facility: HOSPITAL | Age: 71
End: 2021-01-08

## 2021-01-08 VITALS
TEMPERATURE: 97.1 F | SYSTOLIC BLOOD PRESSURE: 120 MMHG | HEART RATE: 92 BPM | OXYGEN SATURATION: 96 % | BODY MASS INDEX: 49.26 KG/M2 | DIASTOLIC BLOOD PRESSURE: 77 MMHG | HEIGHT: 63 IN | WEIGHT: 278 LBS

## 2021-01-08 DIAGNOSIS — I42.0 DILATED CARDIOMYOPATHY (HCC): ICD-10-CM

## 2021-01-08 DIAGNOSIS — I36.1 NONRHEUMATIC TRICUSPID VALVE REGURGITATION: ICD-10-CM

## 2021-01-08 DIAGNOSIS — E11.9 TYPE 2 DIABETES MELLITUS WITHOUT COMPLICATION, WITHOUT LONG-TERM CURRENT USE OF INSULIN (HCC): ICD-10-CM

## 2021-01-08 DIAGNOSIS — I10 ESSENTIAL HYPERTENSION: Primary | Chronic | ICD-10-CM

## 2021-01-08 DIAGNOSIS — I34.0 NONRHEUMATIC MITRAL VALVE REGURGITATION: ICD-10-CM

## 2021-01-08 DIAGNOSIS — I50.42 CHRONIC COMBINED SYSTOLIC AND DIASTOLIC CONGESTIVE HEART FAILURE (HCC): ICD-10-CM

## 2021-01-08 PROCEDURE — 99214 OFFICE O/P EST MOD 30 MIN: CPT | Performed by: INTERNAL MEDICINE

## 2021-01-08 NOTE — OUTREACH NOTE
CHF Week 2 Survey      Responses   [facility] patient discharged from?  [facility]   Does the patient have one of the following disease processes/diagnoses(primary or secondary)?  CHF   Week 2 attempt successful?  No   Unsuccessful attempts  Attempt 1          Hayder Amin RN

## 2021-01-08 NOTE — PROGRESS NOTES
"    Subjective:     Encounter Date:01/08/2021      Patient ID: Jeanna Hudson is a 70 y.o. female.    Chief Complaint:  History of Present Illness  70-year-old -American female patient with a known history of hypertension recently admitted hospital with acute systolic congestive heart failure  Patient echocardiogram showed EF is more than 55% severe pulmonary hypertension moderate to severe tricuspid insufficiency moderate severe mitral insufficiency and EF is 35 to 40% RV sizes mild to moderately dilated biatrial enlargement    Patient was treated with diuresis blood pressure control patient decided to go home and follow-up as an outpatient  Advised patient to be scheduled for right and left cardiac cath and EMILY  Bumex dose will be decreased since she is euvolemic    The following portions of the patient's history were reviewed and updated as appropriate: Allergies current medications past family history past medical history past social history past surgical history problem list and review of systems  Past Medical History:   Diagnosis Date   • Arthritis    • Congestive heart failure (CMS/[facility]) 12/30/2020   • Gout    • Hypertension 9/3/2015   • Sciatica    • Type 2 diabetes mellitus without complication, without long-term current use of insulin (CMS/[facility]) 9/3/2019    glucometer, test strips and lancets sent new onset start metformin DM education encouraged weight loss     History reviewed. No pertinent surgical history.  /77 (BP Location: Left arm, Patient Position: Sitting)   Pulse 92   Temp 97.1 °F (36.2 °C)   Ht 160 cm (63\")   Wt 126 kg (278 lb)   SpO2 96%   BMI 49.25 kg/m²   Family History   Problem Relation Age of Onset   • Heart disease Mother    • Heart failure Mother    • Heart disease Sister    • Heart failure Sister    • Heart disease Sister    • Heart failure Sister        Current Outpatient Medications:   •  albuterol sulfate  (90 Base) MCG/ACT inhaler, Inhale 2 puffs Every 4 " (Four) Hours As Needed for Wheezing., Disp: 18 g, Rfl: 0  •  allopurinol (ZYLOPRIM) 300 MG tablet, TAKE 1 TABLET BY MOUTH  DAILY, Disp: 90 tablet, Rfl: 2  •  budesonide-formoterol (Symbicort) 80-4.5 MCG/ACT inhaler, Inhale 2 puffs 2 (Two) Times a Day., Disp: 10.2 g, Rfl: 12  •  bumetanide (BUMEX) 2 MG tablet, Take 1 tablet by mouth Daily for 33 days., Disp: 30 tablet, Rfl: 1  •  clotrimazole (LOTRIMIN) 1 % cream, Apply  topically to the appropriate area as directed 2 (Two) Times a Day., Disp: 24 g, Rfl: 0  •  fluocinonide (LIDEX) 0.05 % cream, APPLY TOPICALLY TO THE  APPROPRIATE AREA AS  DIRECTED EVERY 12 HOURS., Disp: 30 g, Rfl: 0  •  losartan (COZAAR) 25 MG tablet, Take 1 tablet by mouth Daily for 33 days., Disp: 30 tablet, Rfl: 1  •  metFORMIN (GLUCOPHAGE) 500 MG tablet, TAKE 1 TABLET BY MOUTH TWO  TIMES DAILY WITH MEALS, Disp: 180 tablet, Rfl: 2  •  Metoprolol Succinate 25 MG capsule extended-release 24 hour sprinkle, Take 25 mg by mouth Daily for 33 days., Disp: 33 capsule, Rfl: 3  •  nystatin (MYCOSTATIN) 796944 UNIT/GM powder, Apply  topically to the appropriate area as directed Every 8 (Eight) Hours., Disp: 30 g, Rfl: 3  Social History     Socioeconomic History   • Marital status:      Spouse name: Not on file   • Number of children: Not on file   • Years of education: Not on file   • Highest education level: Not on file   Tobacco Use   • Smoking status: Former Smoker     Packs/day: 0.25     Types: Cigarettes   • Smokeless tobacco: Never Used   • Tobacco comment: quit a month ago   Substance and Sexual Activity   • Alcohol use: No     Frequency: Never   • Drug use: No   • Sexual activity: Defer     No Known Allergies  Review of Systems   Constitution: Negative for fever and malaise/fatigue.   Cardiovascular: Negative for chest pain, dyspnea on exertion and palpitations.   Respiratory: Negative for cough and shortness of breath.    Skin: Negative for rash.   Gastrointestinal: Negative for abdominal  pain, nausea and vomiting.   Neurological: Negative for focal weakness and headaches.   All other systems reviewed and are negative.             Objective:     Physical Exam  Patient is alert not in any acute distress vital stable neck no JVP elevation lungs but and mostly clear heart sounds S1-S2 present 2/6 systolic murmur left lower sternal border extremities no significant edema bilateral pulses present equal  Procedures    Lab Review:       Assessment:          Diagnosis Plan   1. Essential hypertension  Case Request Cath Lab: Right and Left Heart Cath    CBC (No Diff)    Basic Metabolic Panel    ECG 12 Lead    Adult Transesophageal Echo (EMILY) W/ Cont if Necessary Per Protocol   2. Type 2 diabetes mellitus without complication, without long-term current use of insulin (CMS/HCC)  Case Request Cath Lab: Right and Left Heart Cath    CBC (No Diff)    Basic Metabolic Panel    ECG 12 Lead    COVID PRE-OP / PRE-PROCEDURE SCREENING ORDER (NO ISOLATION) - Swab, Nasopharynx    Adult Transesophageal Echo (EMILY) W/ Cont if Necessary Per Protocol   3. Dilated cardiomyopathy (CMS/HCC)  Case Request Cath Lab: Right and Left Heart Cath    CBC (No Diff)    Basic Metabolic Panel    ECG 12 Lead    COVID PRE-OP / PRE-PROCEDURE SCREENING ORDER (NO ISOLATION) - Swab, Nasopharynx    Adult Transesophageal Echo (EMILY) W/ Cont if Necessary Per Protocol   4. Chronic combined systolic and diastolic congestive heart failure (CMS/HCC)  Case Request Cath Lab: Right and Left Heart Cath    CBC (No Diff)    Basic Metabolic Panel    ECG 12 Lead    COVID PRE-OP / PRE-PROCEDURE SCREENING ORDER (NO ISOLATION) - Swab, Nasopharynx    Adult Transesophageal Echo (EMILY) W/ Cont if Necessary Per Protocol   5. Nonrheumatic mitral valve regurgitation  Adult Transesophageal Echo (EMILY) W/ Cont if Necessary Per Protocol   6. Nonrheumatic tricuspid valve regurgitation  Adult Transesophageal Echo (EMILY) W/ Cont if Necessary Per Protocol          Plan:        MDM  Number of Diagnoses or Management Options  Chronic combined systolic and diastolic congestive heart failure (CMS/HCC): established, improving  Dilated cardiomyopathy (CMS/HCC): established, improving  Essential hypertension: established, improving  Nonrheumatic mitral valve regurgitation: new, needed workup  Nonrheumatic tricuspid valve regurgitation: new, needed workup  Type 2 diabetes mellitus without complication, without long-term current use of insulin (CMS/HCC): established, improving     Amount and/or Complexity of Data Reviewed  Clinical lab tests: ordered and reviewed  Review and summarize past medical records: yes  Independent visualization of images, tracings, or specimens: yes    Risk of Complications, Morbidity, and/or Mortality  Presenting problems: moderate  Management options: moderate    Patient Progress  Patient progress: stable    Patient will continue current medical treatment I will schedule EMILY and right and left cardiac cath in the near future all the risks and benefits alternate options explained to the patient she fully understand and agreed to proceed with the procedure

## 2021-01-09 PROBLEM — I36.1 NONRHEUMATIC TRICUSPID VALVE REGURGITATION: Status: ACTIVE | Noted: 2021-01-09

## 2021-01-09 PROBLEM — I34.0 NONRHEUMATIC MITRAL VALVE REGURGITATION: Status: ACTIVE | Noted: 2021-01-09

## 2021-01-11 NOTE — PROGRESS NOTES
"Enter Query Response Below      Query Response:     other __biventricular heart failure  Electronically signed by Mohit Gomez MD, 21, 8:47 AM EST.  _____         If applicable, please update the problem list.        Patient: Jeanna Hudson        : 1950  Account: [id number]           Admit Date: 2020        Options to Respond to Query:    1. Access the Encounter     a. From the To-Do Side bar, click Respond With Note.     b. Click New Note     c. Answer query within the yellow box.                d. Update the Problem List if applicable.     Dr. Gomez,    Patient with hypertension and acute exacerbation of systolic heart failure.  Patient underwent TTE on 2020 which showed:    \"Interpretation Summary    · Estimated right ventricular systolic pressure from tricuspid regurgitation is markedly elevated (>55 mmHg).  · Severe pulmonary hypertension is present.  · Moderate to severe tricuspid valve regurgitation is present.  · Moderate to severe mitral valve regurgitation is present with a posteriorly-directed jet noted.  · The left ventricular cavity is mildly dilated.  · Estimated left ventricular EF was in agreement with the calculated left ventricular EF. Left ventricular ejection fraction appears to be 36 - 40%. Left ventricular systolic function is moderately decreased.  · Left ventricular diastolic function is consistent with (grade III w/high LAP) reversible restrictive pattern.  · Left atrial volume is mildly increased.  · The right ventricular cavity is mild to moderately dilated.  · The right atrial cavity is mildly dilated.\"    Can this be further clarified as:    - heart failure due to hypertension  - heart failure due to pulmonary hypertension  - heart failure due to mitral and tricuspid regurgitation  - other _________________________  - unable to clinically determine    By submitting this query, we are merely seeking further clarification of documentation to accurately " reflect all conditions that you are monitoring, evaluating, treating or that extend the hospitalization or utilize additional resources of care. Please utilize your independent clinical judgment when addressing the question(s) above.     This query and your response, once completed, will be entered into the legal medical record.    Sincerely,  Aditi Arriaga RN CCDS [facility]  Clinical Documentation Integrity Program   [email or web address]

## 2021-01-12 ENCOUNTER — TELEPHONE (OUTPATIENT)
Dept: ORTHOPEDIC SURGERY | Facility: CLINIC | Age: 71
End: 2021-01-12

## 2021-01-12 ENCOUNTER — READMISSION MANAGEMENT (OUTPATIENT)
Dept: CALL CENTER | Facility: HOSPITAL | Age: 71
End: 2021-01-12

## 2021-01-12 NOTE — OUTREACH NOTE
"CHF Week 2 Survey      Responses   [facility] patient discharged from?  [facility]   Does the patient have one of the following disease processes/diagnoses(primary or secondary)?  CHF   Week 2 attempt successful?  Yes   Call start time  1059   Call end time  1104   Discharge diagnosis  CHF exacerbation   Meds reviewed with patient/caregiver?  Yes   Is the patient taking all medications as directed (includes completed medication regime)?  Yes   Has the patient kept scheduled appointments due by today?  Yes   Pulse Ox monitoring  Intermittent   Pulse Ox device source  Patient   O2 Sat comments  doing \"well\"    O2 Sat: education provided  Sat levels, When to seek care   O2 Sat education comments  keep sats above 90%   Psychosocial issues?  No   What is the patient's perception of their health status since discharge?  New symptoms unrelated to diagnosis [having issues with her knee--going to see Ortho]   Is the patient weighing daily?  Yes   Is the patient able to teach back Heart Failure Zones?  Yes   Is the patient able to teach back signs and symptoms of worsening condition? (i.e. weight gain, shortness of air, etc.)  Yes   Is the patient/caregiver able to teach back the hierarchy of who to call/visit for symptoms/problems? PCP, Specialist, Home health nurse, Urgent Care, ED, 911  Yes   Additional teach back comments  green zone--doing well with breathing but knee really bothering her   CHF Week 2 call completed?  Yes          Fiordaliza Deleon RN  "

## 2021-01-12 NOTE — TELEPHONE ENCOUNTER
PATIENT WOULD LIKE TO DISCUSS IF HER INSURANCE WILL COVER GEL INJECTIONS FOR LEFT KNEE. IF SO, SHE WOULD LIKE TO SCHEDULE APPOINTMENT WITH DR. GUERRERO.     BOUCHRA KIRBY MAY BE REACHED AT:  [phone]

## 2021-01-13 NOTE — TELEPHONE ENCOUNTER
Caller: BOUCHRA KIRBY   Relationship to Patient: SELF    Phone Number: [phone]  Reason for Call: PATIENT CALLING BACK LOOKING FOR INFORMATION ABOUT INSURANCE COVERAGE

## 2021-01-14 NOTE — TELEPHONE ENCOUNTER
Caller: ADRIAN KIRBY   Relationship to Patient: SELF    Phone Number: [phone]    Reason for Call: PATIENT CALLING BACK TO GO AHEAD AND SCHEDULE THE GEL INJECTION. UNABLE TO WARM TRANSFER TO THE OFFICE

## 2021-01-19 ENCOUNTER — READMISSION MANAGEMENT (OUTPATIENT)
Dept: CALL CENTER | Facility: HOSPITAL | Age: 71
End: 2021-01-19

## 2021-01-19 NOTE — OUTREACH NOTE
CHF Week 3 Survey      Responses   [facility] patient discharged from?  [facility]   Does the patient have one of the following disease processes/diagnoses(primary or secondary)?  CHF   Week 3 attempt successful?  Yes   Call start time  1751   Call end time  1752   Discharge diagnosis  CHF exacerbation   Is the patient taking all medications as directed (includes completed medication regime)?  Yes   Has the patient kept scheduled appointments due by today?  Yes   Psychosocial issues?  No   What is the patient's perception of their health status since discharge?  Improving   Nursing interventions  Nurse provided patient education   Is the patient weighing daily?  Yes   Does the patient have scales?  Yes   Daily weight interventions  Education provided on importance of daily weight   Is the patient able to teach back signs and symptoms of worsening condition? (i.e. weight gain, shortness of air, etc.)  Yes   Is the patient/caregiver able to teach back the hierarchy of who to call/visit for symptoms/problems? PCP, Specialist, Home health nurse, Urgent Care, ED, 911  Yes   CHF Week 3 call completed?  Yes   Wrap up additional comments  States she is doing well, she is going to see her ortho doctor tomorrow.          Dejah Barron RN

## 2021-01-20 ENCOUNTER — OFFICE VISIT (OUTPATIENT)
Dept: ORTHOPEDIC SURGERY | Facility: CLINIC | Age: 71
End: 2021-01-20

## 2021-01-20 VITALS
SYSTOLIC BLOOD PRESSURE: 112 MMHG | BODY MASS INDEX: 48.73 KG/M2 | HEIGHT: 63 IN | WEIGHT: 275 LBS | HEART RATE: 125 BPM | DIASTOLIC BLOOD PRESSURE: 70 MMHG

## 2021-01-20 DIAGNOSIS — M17.12 PRIMARY OSTEOARTHRITIS OF LEFT KNEE: Primary | ICD-10-CM

## 2021-01-20 PROCEDURE — 99213 OFFICE O/P EST LOW 20 MIN: CPT | Performed by: ORTHOPAEDIC SURGERY

## 2021-01-20 NOTE — PROGRESS NOTES
"     Patient ID: Jeanna Hudson is a 70 y.o. female.  Left knee pain Jeanna is a 70-year-old female with longstanding left knee pain.  She did very well with steroid injection of February of last year      Review of Systems:  Left knee pain      Objective:    /70   Pulse (!) 125   Ht 160 cm (63\")   Wt 125 kg (275 lb)   BMI 48.71 kg/m²     Physical Examination:  Patient pleasant female no distress.  Left knee demonstrates mild varus alignment with a mild effusion, she has medial lateral joint line tenderness with range of motion 0 to 115 degrees.  No gross instability.Sensory and motor exam are intact all distributions. Dorsalis pedis and posterior tibialis pulses are palpable and capillary refill is less than two seconds to all digits       Imaging:   left Knee X-Ray  Indication: Chronic left knee pain  AP, Lateral views, [place]  Findings: Further arthritic progression with mild varus alignment and severe end-stage degenerative disease  no bony lesion  Soft tissues normal  decreased joint spaces  Hardware appropriately positioned not applicable      yes prior studies available for comparison    Assessment:    Left knee degenerative disease    Plan:   Treatment options were discussed.  She would like to avoid surgery at all costs.  This point she would like to proceed with viscosupplementation      Procedures          Disclaimer: Please note that areas of this note were completed with computer voice recognition software.  Quite often unanticipated grammatical, syntax, homophones, and other interpretive errors are inadvertently transcribed by the computer software. Please excuse any errors that have escaped final proofreading.  "

## 2021-01-22 ENCOUNTER — TELEPHONE (OUTPATIENT)
Dept: ORTHOPEDIC SURGERY | Facility: CLINIC | Age: 71
End: 2021-01-22

## 2021-01-29 ENCOUNTER — TELEPHONE (OUTPATIENT)
Dept: ORTHOPEDIC SURGERY | Facility: CLINIC | Age: 71
End: 2021-01-29

## 2021-02-05 NOTE — TELEPHONE ENCOUNTER
Called [facility] to try and start PA. Representative states that Monovisc is not a preferred drug. Will try getting Durolane approved.     Spoke with patient to update her.

## 2021-02-09 ENCOUNTER — TELEPHONE (OUTPATIENT)
Dept: FAMILY MEDICINE CLINIC | Facility: CLINIC | Age: 71
End: 2021-02-09

## 2021-02-09 DIAGNOSIS — G89.29 CHRONIC PAIN OF LEFT KNEE: Primary | ICD-10-CM

## 2021-02-09 DIAGNOSIS — M25.562 CHRONIC PAIN OF LEFT KNEE: Primary | ICD-10-CM

## 2021-02-09 NOTE — TELEPHONE ENCOUNTER
Durolane not covered through patients pharmacy benefits.    Patient stated that she would like to try Zilretta again, in she did get a couple months of relief from it last time.    Will submit on [employer].

## 2021-02-12 ENCOUNTER — TELEPHONE (OUTPATIENT)
Dept: ORTHOPEDIC SURGERY | Facility: CLINIC | Age: 71
End: 2021-02-12

## 2021-02-12 NOTE — TELEPHONE ENCOUNTER
PT advised that Zilretta Request just came back and that PT would be responsible for a $60 copay. PT also advised that she would be contacted to set up appointment when Zilretta Injection Arrived. PT voiced understanding and wanted to make sure that we understood that she is constant pain and really needs to be communicated with. CMA voiced understanding and had no further.

## 2021-02-12 NOTE — TELEPHONE ENCOUNTER
No prior authorization or referrals needed. Patient has a Fully Funded Medicare Advantage HMO with an effective date of 1/1/21. Plan follows Medicare guidelines. Patient responsibility for  (Zilretta) will be 20% with the remaining covered 80% by the payer at the concentrated rate. Patient is responsible for a $30 co pay for CPT code 42273 with the remaining covered at 100% by the payer at contracted rate. If all services are billed on the same day, all co pays will apply. PT will be responsible for a total co pay of $60 per date of service. PT has an out of pocket maximum of $6700 and has accumulated $67.38. If out of pocket is met, coverage goes to 100% and co pays will no longer apply.

## 2021-02-19 ENCOUNTER — APPOINTMENT (OUTPATIENT)
Dept: GENERAL RADIOLOGY | Facility: HOSPITAL | Age: 71
End: 2021-02-19

## 2021-02-19 ENCOUNTER — OFFICE VISIT (OUTPATIENT)
Dept: FAMILY MEDICINE CLINIC | Facility: CLINIC | Age: 71
End: 2021-02-19

## 2021-02-19 ENCOUNTER — HOSPITAL ENCOUNTER (INPATIENT)
Facility: HOSPITAL | Age: 71
LOS: 11 days | Discharge: HOME | End: 2021-03-02
Attending: EMERGENCY MEDICINE | Admitting: HOSPITALIST

## 2021-02-19 VITALS
HEIGHT: 63 IN | BODY MASS INDEX: 48.73 KG/M2 | SYSTOLIC BLOOD PRESSURE: 154 MMHG | HEART RATE: 126 BPM | DIASTOLIC BLOOD PRESSURE: 102 MMHG | WEIGHT: 275 LBS | OXYGEN SATURATION: 95 % | TEMPERATURE: 97.5 F

## 2021-02-19 DIAGNOSIS — I34.0 SEVERE MITRAL REGURGITATION: ICD-10-CM

## 2021-02-19 DIAGNOSIS — R06.00 DYSPNEA, UNSPECIFIED TYPE: Primary | ICD-10-CM

## 2021-02-19 DIAGNOSIS — I10 ESSENTIAL HYPERTENSION: ICD-10-CM

## 2021-02-19 DIAGNOSIS — I27.20 PULMONARY HYPERTENSION (HCC): Chronic | ICD-10-CM

## 2021-02-19 DIAGNOSIS — I42.0 DILATED CARDIOMYOPATHY (HCC): ICD-10-CM

## 2021-02-19 DIAGNOSIS — I26.09 ACUTE PULMONARY EMBOLISM WITH ACUTE COR PULMONALE, UNSPECIFIED PULMONARY EMBOLISM TYPE (HCC): ICD-10-CM

## 2021-02-19 DIAGNOSIS — R06.02 SHORTNESS OF BREATH: Primary | ICD-10-CM

## 2021-02-19 DIAGNOSIS — I50.43 ACUTE ON CHRONIC COMBINED SYSTOLIC AND DIASTOLIC CHF (CONGESTIVE HEART FAILURE) (HCC): ICD-10-CM

## 2021-02-19 DIAGNOSIS — I50.9 ACUTE ON CHRONIC CONGESTIVE HEART FAILURE, UNSPECIFIED HEART FAILURE TYPE (HCC): ICD-10-CM

## 2021-02-19 PROBLEM — E11.9 TYPE 2 DIABETES MELLITUS WITHOUT COMPLICATION, WITHOUT LONG-TERM CURRENT USE OF INSULIN: Chronic | Status: ACTIVE | Noted: 2021-01-08

## 2021-02-19 PROBLEM — I07.1 SEVERE TRICUSPID REGURGITATION: Chronic | Status: ACTIVE | Noted: 2021-02-19

## 2021-02-19 PROBLEM — N17.9 AKI (ACUTE KIDNEY INJURY): Status: ACTIVE | Noted: 2021-02-19

## 2021-02-19 PROBLEM — I27.81 COR PULMONALE (CHRONIC): Chronic | Status: ACTIVE | Noted: 2021-02-19

## 2021-02-19 LAB
ANION GAP SERPL CALCULATED.3IONS-SCNC: 14 MMOL/L (ref 5–15)
ANION GAP SERPL CALCULATED.3IONS-SCNC: 15 MMOL/L (ref 5–15)
BASOPHILS # BLD AUTO: 0.1 10*3/MM3 (ref 0–0.2)
BASOPHILS NFR BLD AUTO: 1 % (ref 0–1.5)
BUN SERPL-MCNC: 26 MG/DL (ref 8–23)
BUN SERPL-MCNC: 27 MG/DL (ref 8–23)
BUN/CREAT SERPL: 16.9 (ref 7–25)
BUN/CREAT SERPL: 17.4 (ref 7–25)
CALCIUM SPEC-SCNC: 10 MG/DL (ref 8.6–10.5)
CALCIUM SPEC-SCNC: 10.1 MG/DL (ref 8.6–10.5)
CHLORIDE SERPL-SCNC: 103 MMOL/L (ref 98–107)
CHLORIDE SERPL-SCNC: 105 MMOL/L (ref 98–107)
CO2 SERPL-SCNC: 21 MMOL/L (ref 22–29)
CO2 SERPL-SCNC: 24 MMOL/L (ref 22–29)
CREAT SERPL-MCNC: 1.54 MG/DL (ref 0.57–1)
CREAT SERPL-MCNC: 1.55 MG/DL (ref 0.57–1)
CRP SERPL-MCNC: 3.67 MG/DL (ref 0–0.5)
D DIMER PPP FEU-MCNC: 2.44 MG/L (FEU) (ref 0–0.59)
DEPRECATED RDW RBC AUTO: 48.6 FL (ref 37–54)
EOSINOPHIL # BLD AUTO: 0 10*3/MM3 (ref 0–0.4)
EOSINOPHIL NFR BLD AUTO: 0.3 % (ref 0.3–6.2)
ERYTHROCYTE [DISTWIDTH] IN BLOOD BY AUTOMATED COUNT: 16 % (ref 12.3–15.4)
GFR SERPL CREATININE-BSD FRML MDRD: 40 ML/MIN/1.73
GFR SERPL CREATININE-BSD FRML MDRD: 40 ML/MIN/1.73
GLUCOSE BLDC GLUCOMTR-MCNC: 198 MG/DL (ref 70–105)
GLUCOSE SERPL-MCNC: 114 MG/DL (ref 65–99)
GLUCOSE SERPL-MCNC: 178 MG/DL (ref 65–99)
HCT VFR BLD AUTO: 36.8 % (ref 34–46.6)
HGB BLD-MCNC: 12.3 G/DL (ref 12–15.9)
LYMPHOCYTES # BLD AUTO: 1.4 10*3/MM3 (ref 0.7–3.1)
LYMPHOCYTES NFR BLD AUTO: 13.7 % (ref 19.6–45.3)
MCH RBC QN AUTO: 28.9 PG (ref 26.6–33)
MCHC RBC AUTO-ENTMCNC: 33.3 G/DL (ref 31.5–35.7)
MCV RBC AUTO: 86.9 FL (ref 79–97)
MONOCYTES # BLD AUTO: 0.5 10*3/MM3 (ref 0.1–0.9)
MONOCYTES NFR BLD AUTO: 5 % (ref 5–12)
NEUTROPHILS NFR BLD AUTO: 8.3 10*3/MM3 (ref 1.7–7)
NEUTROPHILS NFR BLD AUTO: 80 % (ref 42.7–76)
NRBC BLD AUTO-RTO: 0.1 /100 WBC (ref 0–0.2)
NT-PROBNP SERPL-MCNC: 6689 PG/ML (ref 0–900)
PLATELET # BLD AUTO: 209 10*3/MM3 (ref 140–450)
PMV BLD AUTO: 9.7 FL (ref 6–12)
POTASSIUM SERPL-SCNC: 4.2 MMOL/L (ref 3.5–5.2)
POTASSIUM SERPL-SCNC: 4.2 MMOL/L (ref 3.5–5.2)
PROCALCITONIN SERPL-MCNC: 0.16 NG/ML (ref 0–0.25)
QT INTERVAL: 330 MS
RBC # BLD AUTO: 4.24 10*6/MM3 (ref 3.77–5.28)
SARS-COV-2 ORF1AB RESP QL NAA+PROBE: NOT DETECTED
SODIUM SERPL-SCNC: 139 MMOL/L (ref 136–145)
SODIUM SERPL-SCNC: 143 MMOL/L (ref 136–145)
TROPONIN T SERPL-MCNC: 0.02 NG/ML (ref 0–0.03)
TROPONIN T SERPL-MCNC: <0.01 NG/ML (ref 0–0.03)
WBC # BLD AUTO: 10.4 10*3/MM3 (ref 3.4–10.8)

## 2021-02-19 PROCEDURE — 80048 BASIC METABOLIC PNL TOTAL CA: CPT | Performed by: NURSE PRACTITIONER

## 2021-02-19 PROCEDURE — 84484 ASSAY OF TROPONIN QUANT: CPT | Performed by: NURSE PRACTITIONER

## 2021-02-19 PROCEDURE — 25010000002 FUROSEMIDE PER 20 MG: Performed by: NURSE PRACTITIONER

## 2021-02-19 PROCEDURE — 84484 ASSAY OF TROPONIN QUANT: CPT | Performed by: EMERGENCY MEDICINE

## 2021-02-19 PROCEDURE — 99223 1ST HOSP IP/OBS HIGH 75: CPT | Performed by: INTERNAL MEDICINE

## 2021-02-19 PROCEDURE — 25010000002 FUROSEMIDE PER 20 MG: Performed by: EMERGENCY MEDICINE

## 2021-02-19 PROCEDURE — 86140 C-REACTIVE PROTEIN: CPT | Performed by: NURSE PRACTITIONER

## 2021-02-19 PROCEDURE — 99284 EMERGENCY DEPT VISIT MOD MDM: CPT

## 2021-02-19 PROCEDURE — 93005 ELECTROCARDIOGRAM TRACING: CPT

## 2021-02-19 PROCEDURE — 85379 FIBRIN DEGRADATION QUANT: CPT | Performed by: NURSE PRACTITIONER

## 2021-02-19 PROCEDURE — 71045 X-RAY EXAM CHEST 1 VIEW: CPT

## 2021-02-19 PROCEDURE — 83880 ASSAY OF NATRIURETIC PEPTIDE: CPT | Performed by: EMERGENCY MEDICINE

## 2021-02-19 PROCEDURE — 99213 OFFICE O/P EST LOW 20 MIN: CPT | Performed by: FAMILY MEDICINE

## 2021-02-19 PROCEDURE — 84145 PROCALCITONIN (PCT): CPT | Performed by: NURSE PRACTITIONER

## 2021-02-19 PROCEDURE — 82962 GLUCOSE BLOOD TEST: CPT

## 2021-02-19 PROCEDURE — 94799 UNLISTED PULMONARY SVC/PX: CPT

## 2021-02-19 PROCEDURE — 93005 ELECTROCARDIOGRAM TRACING: CPT | Performed by: EMERGENCY MEDICINE

## 2021-02-19 PROCEDURE — U0004 COV-19 TEST NON-CDC HGH THRU: HCPCS | Performed by: EMERGENCY MEDICINE

## 2021-02-19 PROCEDURE — 94640 AIRWAY INHALATION TREATMENT: CPT

## 2021-02-19 PROCEDURE — 80048 BASIC METABOLIC PNL TOTAL CA: CPT | Performed by: EMERGENCY MEDICINE

## 2021-02-19 PROCEDURE — 25010000002 HEPARIN (PORCINE) PER 1000 UNITS: Performed by: NURSE PRACTITIONER

## 2021-02-19 PROCEDURE — 85025 COMPLETE CBC W/AUTO DIFF WBC: CPT | Performed by: EMERGENCY MEDICINE

## 2021-02-19 RX ORDER — INSULIN LISPRO 100 [IU]/ML
0-9 INJECTION, SOLUTION INTRAVENOUS; SUBCUTANEOUS
Status: DISCONTINUED | OUTPATIENT
Start: 2021-02-19 | End: 2021-03-02 | Stop reason: HOSPADM

## 2021-02-19 RX ORDER — MAGNESIUM SULFATE 1 G/100ML
1 INJECTION INTRAVENOUS AS NEEDED
Status: DISCONTINUED | OUTPATIENT
Start: 2021-02-19 | End: 2021-03-02 | Stop reason: HOSPADM

## 2021-02-19 RX ORDER — FUROSEMIDE 10 MG/ML
40 INJECTION INTRAMUSCULAR; INTRAVENOUS EVERY 12 HOURS
Status: COMPLETED | OUTPATIENT
Start: 2021-02-19 | End: 2021-02-20

## 2021-02-19 RX ORDER — INSULIN LISPRO 100 [IU]/ML
0-9 INJECTION, SOLUTION INTRAVENOUS; SUBCUTANEOUS AS NEEDED
Status: DISCONTINUED | OUTPATIENT
Start: 2021-02-19 | End: 2021-03-02 | Stop reason: HOSPADM

## 2021-02-19 RX ORDER — SPIRONOLACTONE 25 MG/1
25 TABLET ORAL DAILY
Status: DISCONTINUED | OUTPATIENT
Start: 2021-02-19 | End: 2021-03-02 | Stop reason: HOSPADM

## 2021-02-19 RX ORDER — ACETAMINOPHEN 325 MG/1
650 TABLET ORAL EVERY 4 HOURS PRN
Status: DISCONTINUED | OUTPATIENT
Start: 2021-02-19 | End: 2021-03-02 | Stop reason: HOSPADM

## 2021-02-19 RX ORDER — ALBUTEROL SULFATE 90 UG/1
2 AEROSOL, METERED RESPIRATORY (INHALATION) EVERY 4 HOURS PRN
Status: DISCONTINUED | OUTPATIENT
Start: 2021-02-19 | End: 2021-03-02 | Stop reason: HOSPADM

## 2021-02-19 RX ORDER — ASPIRIN 325 MG
325 TABLET, DELAYED RELEASE (ENTERIC COATED) ORAL DAILY
Status: DISCONTINUED | OUTPATIENT
Start: 2021-02-19 | End: 2021-03-02 | Stop reason: HOSPADM

## 2021-02-19 RX ORDER — ACETAMINOPHEN 650 MG/1
650 SUPPOSITORY RECTAL EVERY 4 HOURS PRN
Status: DISCONTINUED | OUTPATIENT
Start: 2021-02-19 | End: 2021-03-02 | Stop reason: HOSPADM

## 2021-02-19 RX ORDER — METOPROLOL SUCCINATE 25 MG/1
25 TABLET, EXTENDED RELEASE ORAL DAILY
Status: DISCONTINUED | OUTPATIENT
Start: 2021-02-19 | End: 2021-03-02 | Stop reason: HOSPADM

## 2021-02-19 RX ORDER — MAGNESIUM SULFATE HEPTAHYDRATE 40 MG/ML
2 INJECTION, SOLUTION INTRAVENOUS AS NEEDED
Status: DISCONTINUED | OUTPATIENT
Start: 2021-02-19 | End: 2021-03-02 | Stop reason: HOSPADM

## 2021-02-19 RX ORDER — ONDANSETRON 2 MG/ML
4 INJECTION INTRAMUSCULAR; INTRAVENOUS EVERY 6 HOURS PRN
Status: DISCONTINUED | OUTPATIENT
Start: 2021-02-19 | End: 2021-02-22

## 2021-02-19 RX ORDER — SODIUM CHLORIDE 0.9 % (FLUSH) 0.9 %
10 SYRINGE (ML) INJECTION AS NEEDED
Status: DISCONTINUED | OUTPATIENT
Start: 2021-02-19 | End: 2021-03-02 | Stop reason: HOSPADM

## 2021-02-19 RX ORDER — BUMETANIDE 2 MG/1
2 TABLET ORAL DAILY
COMMUNITY
End: 2021-03-02 | Stop reason: HOSPADM

## 2021-02-19 RX ORDER — NITROGLYCERIN 0.4 MG/1
0.4 TABLET SUBLINGUAL
Status: DISCONTINUED | OUTPATIENT
Start: 2021-02-19 | End: 2021-03-02 | Stop reason: HOSPADM

## 2021-02-19 RX ORDER — NICOTINE POLACRILEX 4 MG
15 LOZENGE BUCCAL
Status: DISCONTINUED | OUTPATIENT
Start: 2021-02-19 | End: 2021-03-02 | Stop reason: HOSPADM

## 2021-02-19 RX ORDER — ACETAMINOPHEN 160 MG/5ML
650 SOLUTION ORAL EVERY 4 HOURS PRN
Status: DISCONTINUED | OUTPATIENT
Start: 2021-02-19 | End: 2021-03-02 | Stop reason: HOSPADM

## 2021-02-19 RX ORDER — POTASSIUM CHLORIDE 20 MEQ/1
40 TABLET, EXTENDED RELEASE ORAL AS NEEDED
Status: DISCONTINUED | OUTPATIENT
Start: 2021-02-19 | End: 2021-03-02 | Stop reason: HOSPADM

## 2021-02-19 RX ORDER — ONDANSETRON 4 MG/1
4 TABLET, FILM COATED ORAL EVERY 6 HOURS PRN
Status: DISCONTINUED | OUTPATIENT
Start: 2021-02-19 | End: 2021-02-22

## 2021-02-19 RX ORDER — HEPARIN SODIUM 5000 [USP'U]/ML
5000 INJECTION, SOLUTION INTRAVENOUS; SUBCUTANEOUS EVERY 12 HOURS SCHEDULED
Status: DISCONTINUED | OUTPATIENT
Start: 2021-02-19 | End: 2021-02-20

## 2021-02-19 RX ORDER — SODIUM CHLORIDE 0.9 % (FLUSH) 0.9 %
10 SYRINGE (ML) INJECTION EVERY 12 HOURS SCHEDULED
Status: DISCONTINUED | OUTPATIENT
Start: 2021-02-19 | End: 2021-03-02 | Stop reason: HOSPADM

## 2021-02-19 RX ORDER — METOPROLOL SUCCINATE 25 MG/1
25 TABLET, EXTENDED RELEASE ORAL DAILY
COMMUNITY
End: 2021-04-22 | Stop reason: SDUPTHER

## 2021-02-19 RX ORDER — BUDESONIDE AND FORMOTEROL FUMARATE DIHYDRATE 80; 4.5 UG/1; UG/1
2 AEROSOL RESPIRATORY (INHALATION)
Status: DISCONTINUED | OUTPATIENT
Start: 2021-02-19 | End: 2021-03-02 | Stop reason: HOSPADM

## 2021-02-19 RX ORDER — FUROSEMIDE 10 MG/ML
40 INJECTION INTRAMUSCULAR; INTRAVENOUS ONCE
Status: COMPLETED | OUTPATIENT
Start: 2021-02-19 | End: 2021-02-19

## 2021-02-19 RX ORDER — DEXTROSE MONOHYDRATE 25 G/50ML
25 INJECTION, SOLUTION INTRAVENOUS
Status: DISCONTINUED | OUTPATIENT
Start: 2021-02-19 | End: 2021-03-02 | Stop reason: HOSPADM

## 2021-02-19 RX ORDER — LOSARTAN POTASSIUM 25 MG/1
25 TABLET ORAL DAILY
COMMUNITY
End: 2021-03-02 | Stop reason: HOSPADM

## 2021-02-19 RX ADMIN — Medication 10 ML: at 20:48

## 2021-02-19 RX ADMIN — BUDESONIDE AND FORMOTEROL FUMARATE DIHYDRATE 2 PUFF: 80; 4.5 AEROSOL RESPIRATORY (INHALATION) at 18:51

## 2021-02-19 RX ADMIN — FUROSEMIDE 40 MG: 10 INJECTION, SOLUTION INTRAMUSCULAR; INTRAVENOUS at 20:47

## 2021-02-19 RX ADMIN — HEPARIN SODIUM 5000 UNITS: 5000 INJECTION INTRAVENOUS; SUBCUTANEOUS at 20:47

## 2021-02-19 RX ADMIN — SPIRONOLACTONE 25 MG: 25 TABLET ORAL at 20:47

## 2021-02-19 RX ADMIN — FUROSEMIDE 40 MG: 10 INJECTION, SOLUTION INTRAMUSCULAR; INTRAVENOUS at 14:53

## 2021-02-19 RX ADMIN — METOPROLOL SUCCINATE 25 MG: 25 TABLET, EXTENDED RELEASE ORAL at 20:47

## 2021-02-19 NOTE — PROGRESS NOTES
Subjective   Jeanna Parks is a 70 y.o. female.     Here with complaints of trouble with her breathing  3 days  Getting worse  Worse with ambulation  Right -sided cp  1/10  Sleeping well  No fever  N/V last night  No sick contacts  Used her inhaler this morning  Denies any change in meds       The following portions of the patient's history were reviewed and updated as appropriate: allergies, current medications, past family history, past medical history, past social history, past surgical history and problem list.  Past Medical History:   Diagnosis Date   • Arthritis    • Congestive heart failure (CMS/[facility]) 12/30/2020   • Gout    • Hypertension 9/3/2015   • Sciatica    • Type 2 diabetes mellitus without complication, without long-term current use of insulin (CMS/[facility]) 9/3/2019    glucometer, test strips and lancets sent new onset start metformin DM education encouraged weight loss     History reviewed. No pertinent surgical history.  Family History   Problem Relation Age of Onset   • Heart disease Mother    • Heart failure Mother    • Heart disease Sister    • Heart failure Sister    • Heart disease Sister    • Heart failure Sister      Social History     Socioeconomic History   • Marital status:      Spouse name: Not on file   • Number of children: Not on file   • Years of education: Not on file   • Highest education level: Not on file   Tobacco Use   • Smoking status: Former Smoker     Packs/day: 0.25     Types: Cigarettes   • Smokeless tobacco: Never Used   • Tobacco comment: quit a month ago   Substance and Sexual Activity   • Alcohol use: No     Frequency: Never   • Drug use: No   • Sexual activity: Defer         Current Outpatient Medications:   •  albuterol sulfate  (90 Base) MCG/ACT inhaler, Inhale 2 puffs Every 4 (Four) Hours As Needed for Wheezing., Disp: 18 g, Rfl: 0  •  allopurinol (ZYLOPRIM) 300 MG tablet, TAKE 1 TABLET BY MOUTH  DAILY, Disp: 90 tablet, Rfl: 2  •   "budesonide-formoterol (Symbicort) 80-4.5 MCG/ACT inhaler, Inhale 2 puffs 2 (Two) Times a Day., Disp: 10.2 g, Rfl: 12  •  clotrimazole (LOTRIMIN) 1 % cream, Apply  topically to the appropriate area as directed 2 (Two) Times a Day., Disp: 24 g, Rfl: 0  •  fluocinonide (LIDEX) 0.05 % cream, APPLY TOPICALLY TO THE  APPROPRIATE AREA AS  DIRECTED EVERY 12 HOURS., Disp: 30 g, Rfl: 0  •  metFORMIN (GLUCOPHAGE) 500 MG tablet, TAKE 1 TABLET BY MOUTH TWO  TIMES DAILY WITH MEALS, Disp: 180 tablet, Rfl: 2  •  nystatin (MYCOSTATIN) [id number] UNIT/GM powder, Apply  topically to the appropriate area as directed Every 8 (Eight) Hours., Disp: 30 g, Rfl: 3  •  bumetanide (BUMEX) 2 MG tablet, Take 1 tablet by mouth Daily for 33 days., Disp: 30 tablet, Rfl: 1  •  losartan (COZAAR) 25 MG tablet, Take 1 tablet by mouth Daily for 33 days., Disp: 30 tablet, Rfl: 1  •  Metoprolol Succinate 25 MG capsule extended-release 24 hour sprinkle, Take 25 mg by mouth Daily for 33 days., Disp: 33 capsule, Rfl: 3    Review of Systems   Constitutional: Negative for diaphoresis, fatigue, fever, unexpected weight gain and unexpected weight loss.   Respiratory: Positive for shortness of breath. Negative for cough and chest tightness.    Cardiovascular: Positive for chest pain. Negative for palpitations and leg swelling.   Gastrointestinal: Positive for nausea and vomiting. Negative for abdominal pain and diarrhea.   Neurological: Negative for dizziness, syncope and headache.     BP (!) 154/102 (BP Location: Left arm, Patient Position: Sitting, Cuff Size: Large Adult)   Pulse (!) 126   Temp 97.5 °F (36.4 °C) (Temporal)   Ht 160 cm (63\")   Wt 125 kg (275 lb)   SpO2 95%   Breastfeeding No   BMI 48.71 kg/m²       Objective   Physical Exam  Vitals signs and nursing note reviewed.   Constitutional:       General: She is in acute distress.      Appearance: She is well-developed.      Comments: Audibly short of breath and having trouble completing a sentence "   HENT:      Head: Normocephalic and atraumatic.   Neck:      Musculoskeletal: Neck supple.   Cardiovascular:      Rate and Rhythm: Regular rhythm. Tachycardia present.      Heart sounds: Normal heart sounds. No murmur. No friction rub. No gallop.    Pulmonary:      Effort: Respiratory distress present.      Breath sounds: Normal breath sounds. No wheezing or rales.   Skin:     General: Skin is warm and dry.   Neurological:      Mental Status: She is alert.           Assessment/Plan   Problems Addressed this Visit     None      Visit Diagnoses     Shortness of breath    -  Primary      Diagnoses       Codes Comments    Shortness of breath    -  Primary ICD-10-CM: R06.02  ICD-9-CM: 786.05         No obvious source for her dyspnea  Her oxygen level is 95%  She is tachycardic at 126 with slight elevation her blood pressure  With the overall appearance today as well as the history of increasing severity I will go ahead and send her to the emergency room  I spoke to one of the ER physicians at [facility]  Her son is taking her directly there by car  She did not want to go by ambulance

## 2021-02-20 ENCOUNTER — APPOINTMENT (OUTPATIENT)
Dept: NUCLEAR MEDICINE | Facility: HOSPITAL | Age: 71
End: 2021-02-20

## 2021-02-20 PROBLEM — I26.09 ACUTE PULMONARY EMBOLISM WITH ACUTE COR PULMONALE (HCC): Status: ACTIVE | Noted: 2021-02-20

## 2021-02-20 LAB
ANION GAP SERPL CALCULATED.3IONS-SCNC: 13 MMOL/L (ref 5–15)
BACTERIA UR QL AUTO: ABNORMAL /HPF
BILIRUB UR QL STRIP: NEGATIVE
BILIRUB UR QL STRIP: NEGATIVE
BUN SERPL-MCNC: 34 MG/DL (ref 8–23)
BUN/CREAT SERPL: 19.5 (ref 7–25)
CALCIUM SPEC-SCNC: 10.1 MG/DL (ref 8.6–10.5)
CHLORIDE SERPL-SCNC: 102 MMOL/L (ref 98–107)
CLARITY UR: ABNORMAL
CLARITY UR: ABNORMAL
CO2 SERPL-SCNC: 23 MMOL/L (ref 22–29)
COLOR UR: YELLOW
COLOR UR: YELLOW
CREAT SERPL-MCNC: 1.74 MG/DL (ref 0.57–1)
DEPRECATED RDW RBC AUTO: 49 FL (ref 37–54)
ERYTHROCYTE [DISTWIDTH] IN BLOOD BY AUTOMATED COUNT: 15.9 % (ref 12.3–15.4)
GFR SERPL CREATININE-BSD FRML MDRD: 35 ML/MIN/1.73
GLUCOSE BLDC GLUCOMTR-MCNC: 139 MG/DL (ref 70–105)
GLUCOSE BLDC GLUCOMTR-MCNC: 149 MG/DL (ref 70–105)
GLUCOSE BLDC GLUCOMTR-MCNC: 160 MG/DL (ref 70–105)
GLUCOSE BLDC GLUCOMTR-MCNC: 185 MG/DL (ref 70–105)
GLUCOSE SERPL-MCNC: 151 MG/DL (ref 65–99)
GLUCOSE UR STRIP-MCNC: ABNORMAL MG/DL
GLUCOSE UR STRIP-MCNC: NEGATIVE MG/DL
HCT VFR BLD AUTO: 35.6 % (ref 34–46.6)
HGB BLD-MCNC: 11.7 G/DL (ref 12–15.9)
HGB UR QL STRIP.AUTO: NEGATIVE
HGB UR QL STRIP.AUTO: NEGATIVE
HYALINE CASTS UR QL AUTO: ABNORMAL /LPF
KETONES UR QL STRIP: NEGATIVE
KETONES UR QL STRIP: NEGATIVE
LEUKOCYTE ESTERASE UR QL STRIP.AUTO: ABNORMAL
LEUKOCYTE ESTERASE UR QL STRIP.AUTO: NEGATIVE
MAGNESIUM SERPL-MCNC: 2 MG/DL (ref 1.6–2.4)
MCH RBC QN AUTO: 28.6 PG (ref 26.6–33)
MCHC RBC AUTO-ENTMCNC: 32.9 G/DL (ref 31.5–35.7)
MCV RBC AUTO: 87 FL (ref 79–97)
NITRITE UR QL STRIP: NEGATIVE
NITRITE UR QL STRIP: NEGATIVE
NT-PROBNP SERPL-MCNC: ABNORMAL PG/ML (ref 0–900)
PH UR STRIP.AUTO: 5.5 [PH] (ref 5–8)
PH UR STRIP.AUTO: 5.5 [PH] (ref 5–8)
PLATELET # BLD AUTO: 212 10*3/MM3 (ref 140–450)
PMV BLD AUTO: 9.8 FL (ref 6–12)
POTASSIUM SERPL-SCNC: 4.2 MMOL/L (ref 3.5–5.2)
PROT UR QL STRIP: NEGATIVE
PROT UR QL STRIP: NEGATIVE
RBC # BLD AUTO: 4.09 10*6/MM3 (ref 3.77–5.28)
RBC # UR: ABNORMAL /HPF
REF LAB TEST METHOD: ABNORMAL
SODIUM SERPL-SCNC: 138 MMOL/L (ref 136–145)
SODIUM UR-SCNC: 76 MMOL/L
SP GR UR STRIP: 1.01 (ref 1–1.03)
SP GR UR STRIP: 1.01 (ref 1–1.03)
SQUAMOUS #/AREA URNS HPF: ABNORMAL /HPF
UROBILINOGEN UR QL STRIP: ABNORMAL
UROBILINOGEN UR QL STRIP: ABNORMAL
WBC # BLD AUTO: 11.4 10*3/MM3 (ref 3.4–10.8)
WBC UR QL AUTO: ABNORMAL /HPF

## 2021-02-20 PROCEDURE — 94799 UNLISTED PULMONARY SVC/PX: CPT

## 2021-02-20 PROCEDURE — 85027 COMPLETE CBC AUTOMATED: CPT | Performed by: NURSE PRACTITIONER

## 2021-02-20 PROCEDURE — 25010000002 ONDANSETRON PER 1 MG: Performed by: NURSE PRACTITIONER

## 2021-02-20 PROCEDURE — 81001 URINALYSIS AUTO W/SCOPE: CPT | Performed by: NURSE PRACTITIONER

## 2021-02-20 PROCEDURE — 63710000001 INSULIN LISPRO (HUMAN) PER 5 UNITS: Performed by: NURSE PRACTITIONER

## 2021-02-20 PROCEDURE — A9538 TC99M PYROPHOSPHATE: HCPCS | Performed by: INTERNAL MEDICINE

## 2021-02-20 PROCEDURE — 0 TECHNETIUM TC99M PYROPHOSPHATE: Performed by: INTERNAL MEDICINE

## 2021-02-20 PROCEDURE — 80048 BASIC METABOLIC PNL TOTAL CA: CPT | Performed by: NURSE PRACTITIONER

## 2021-02-20 PROCEDURE — 0 TECHNETIUM ALBUMIN AGGREGATED: Performed by: INTERNAL MEDICINE

## 2021-02-20 PROCEDURE — 25010000002 HEPARIN (PORCINE) PER 1000 UNITS: Performed by: NURSE PRACTITIONER

## 2021-02-20 PROCEDURE — 81003 URINALYSIS AUTO W/O SCOPE: CPT | Performed by: INTERNAL MEDICINE

## 2021-02-20 PROCEDURE — 99223 1ST HOSP IP/OBS HIGH 75: CPT | Performed by: INTERNAL MEDICINE

## 2021-02-20 PROCEDURE — 78582 LUNG VENTILAT&PERFUS IMAGING: CPT

## 2021-02-20 PROCEDURE — 83735 ASSAY OF MAGNESIUM: CPT | Performed by: NURSE PRACTITIONER

## 2021-02-20 PROCEDURE — 83880 ASSAY OF NATRIURETIC PEPTIDE: CPT | Performed by: NURSE PRACTITIONER

## 2021-02-20 PROCEDURE — A9540 TC99M MAA: HCPCS | Performed by: INTERNAL MEDICINE

## 2021-02-20 PROCEDURE — 25010000002 FUROSEMIDE PER 20 MG: Performed by: NURSE PRACTITIONER

## 2021-02-20 PROCEDURE — 84300 ASSAY OF URINE SODIUM: CPT | Performed by: INTERNAL MEDICINE

## 2021-02-20 PROCEDURE — 99233 SBSQ HOSP IP/OBS HIGH 50: CPT | Performed by: INTERNAL MEDICINE

## 2021-02-20 PROCEDURE — 82962 GLUCOSE BLOOD TEST: CPT

## 2021-02-20 PROCEDURE — 25010000002 ENOXAPARIN PER 10 MG: Performed by: INTERNAL MEDICINE

## 2021-02-20 RX ORDER — ALLOPURINOL 100 MG/1
100 TABLET ORAL DAILY
Status: DISCONTINUED | OUTPATIENT
Start: 2021-02-20 | End: 2021-03-02 | Stop reason: HOSPADM

## 2021-02-20 RX ADMIN — INSULIN LISPRO 2 UNITS: 100 INJECTION, SOLUTION INTRAVENOUS; SUBCUTANEOUS at 09:30

## 2021-02-20 RX ADMIN — CANAGLIFLOZIN 100 MG: 100 TABLET, FILM COATED ORAL at 12:01

## 2021-02-20 RX ADMIN — ONDANSETRON 4 MG: 2 INJECTION, SOLUTION INTRAMUSCULAR; INTRAVENOUS at 02:15

## 2021-02-20 RX ADMIN — KIT FOR THE PREPARATION OF TECHNETIUM TC 99M ALBUMIN AGGREGATED 1 DOSE: 2.5 INJECTION, POWDER, FOR SOLUTION INTRAVENOUS at 08:45

## 2021-02-20 RX ADMIN — ENOXAPARIN SODIUM 130 MG: 150 INJECTION SUBCUTANEOUS at 15:27

## 2021-02-20 RX ADMIN — ALLOPURINOL 100 MG: 100 TABLET ORAL at 12:01

## 2021-02-20 RX ADMIN — Medication 10 ML: at 09:38

## 2021-02-20 RX ADMIN — ASPIRIN 325 MG: 325 TABLET, COATED ORAL at 09:51

## 2021-02-20 RX ADMIN — HEPARIN SODIUM 5000 UNITS: 5000 INJECTION INTRAVENOUS; SUBCUTANEOUS at 09:34

## 2021-02-20 RX ADMIN — Medication 10 ML: at 20:00

## 2021-02-20 RX ADMIN — METOPROLOL SUCCINATE 25 MG: 25 TABLET, EXTENDED RELEASE ORAL at 09:33

## 2021-02-20 RX ADMIN — FUROSEMIDE 40 MG: 10 INJECTION, SOLUTION INTRAMUSCULAR; INTRAVENOUS at 09:36

## 2021-02-20 RX ADMIN — BUDESONIDE AND FORMOTEROL FUMARATE DIHYDRATE 2 PUFF: 80; 4.5 AEROSOL RESPIRATORY (INHALATION) at 06:52

## 2021-02-20 RX ADMIN — ENOXAPARIN SODIUM 130 MG: 150 INJECTION SUBCUTANEOUS at 23:09

## 2021-02-20 RX ADMIN — SPIRONOLACTONE 25 MG: 25 TABLET ORAL at 09:32

## 2021-02-20 RX ADMIN — TECHNETIUM TC99M PYROPHOSPHATE 1 DOSE: 12 INJECTION INTRAVENOUS at 08:30

## 2021-02-21 LAB
ALBUMIN SERPL-MCNC: 3.8 G/DL (ref 3.5–5.2)
ANION GAP SERPL CALCULATED.3IONS-SCNC: 15 MMOL/L (ref 5–15)
BUN SERPL-MCNC: 47 MG/DL (ref 8–23)
BUN/CREAT SERPL: 24.2 (ref 7–25)
CALCIUM SPEC-SCNC: 9.7 MG/DL (ref 8.6–10.5)
CHLORIDE SERPL-SCNC: 102 MMOL/L (ref 98–107)
CO2 SERPL-SCNC: 21 MMOL/L (ref 22–29)
CREAT SERPL-MCNC: 1.94 MG/DL (ref 0.57–1)
DEPRECATED RDW RBC AUTO: 49.9 FL (ref 37–54)
ERYTHROCYTE [DISTWIDTH] IN BLOOD BY AUTOMATED COUNT: 16.3 % (ref 12.3–15.4)
GFR SERPL CREATININE-BSD FRML MDRD: 31 ML/MIN/1.73
GLUCOSE BLDC GLUCOMTR-MCNC: 124 MG/DL (ref 70–105)
GLUCOSE BLDC GLUCOMTR-MCNC: 146 MG/DL (ref 70–105)
GLUCOSE BLDC GLUCOMTR-MCNC: 154 MG/DL (ref 70–105)
GLUCOSE BLDC GLUCOMTR-MCNC: 171 MG/DL (ref 70–105)
GLUCOSE SERPL-MCNC: 143 MG/DL (ref 65–99)
HCT VFR BLD AUTO: 37.9 % (ref 34–46.6)
HGB BLD-MCNC: 12.1 G/DL (ref 12–15.9)
MAGNESIUM SERPL-MCNC: 2.2 MG/DL (ref 1.6–2.4)
MCH RBC QN AUTO: 28.1 PG (ref 26.6–33)
MCHC RBC AUTO-ENTMCNC: 31.9 G/DL (ref 31.5–35.7)
MCV RBC AUTO: 88.1 FL (ref 79–97)
PHOSPHATE SERPL-MCNC: 4.9 MG/DL (ref 2.5–4.5)
PLATELET # BLD AUTO: 223 10*3/MM3 (ref 140–450)
PMV BLD AUTO: 10.8 FL (ref 6–12)
POTASSIUM SERPL-SCNC: 4.6 MMOL/L (ref 3.5–5.2)
RBC # BLD AUTO: 4.3 10*6/MM3 (ref 3.77–5.28)
SODIUM SERPL-SCNC: 138 MMOL/L (ref 136–145)
WBC # BLD AUTO: 12.6 10*3/MM3 (ref 3.4–10.8)

## 2021-02-21 PROCEDURE — 25010000002 ONDANSETRON PER 1 MG: Performed by: NURSE PRACTITIONER

## 2021-02-21 PROCEDURE — 99233 SBSQ HOSP IP/OBS HIGH 50: CPT | Performed by: INTERNAL MEDICINE

## 2021-02-21 PROCEDURE — 94799 UNLISTED PULMONARY SVC/PX: CPT

## 2021-02-21 PROCEDURE — 85027 COMPLETE CBC AUTOMATED: CPT | Performed by: INTERNAL MEDICINE

## 2021-02-21 PROCEDURE — 82962 GLUCOSE BLOOD TEST: CPT

## 2021-02-21 PROCEDURE — 25010000002 ENOXAPARIN PER 10 MG: Performed by: INTERNAL MEDICINE

## 2021-02-21 PROCEDURE — 83735 ASSAY OF MAGNESIUM: CPT | Performed by: NURSE PRACTITIONER

## 2021-02-21 PROCEDURE — 80069 RENAL FUNCTION PANEL: CPT | Performed by: INTERNAL MEDICINE

## 2021-02-21 PROCEDURE — 63710000001 INSULIN LISPRO (HUMAN) PER 5 UNITS: Performed by: NURSE PRACTITIONER

## 2021-02-21 RX ORDER — TORSEMIDE 10 MG/1
20 TABLET ORAL DAILY
Status: DISCONTINUED | OUTPATIENT
Start: 2021-02-21 | End: 2021-03-02 | Stop reason: HOSPADM

## 2021-02-21 RX ADMIN — TORSEMIDE 20 MG: 10 TABLET ORAL at 11:40

## 2021-02-21 RX ADMIN — ASPIRIN 325 MG: 325 TABLET, COATED ORAL at 08:19

## 2021-02-21 RX ADMIN — ONDANSETRON 4 MG: 2 INJECTION, SOLUTION INTRAMUSCULAR; INTRAVENOUS at 17:31

## 2021-02-21 RX ADMIN — BUDESONIDE AND FORMOTEROL FUMARATE DIHYDRATE 2 PUFF: 80; 4.5 AEROSOL RESPIRATORY (INHALATION) at 07:02

## 2021-02-21 RX ADMIN — CANAGLIFLOZIN 100 MG: 100 TABLET, FILM COATED ORAL at 08:19

## 2021-02-21 RX ADMIN — ALLOPURINOL 100 MG: 100 TABLET ORAL at 08:19

## 2021-02-21 RX ADMIN — SPIRONOLACTONE 25 MG: 25 TABLET ORAL at 08:19

## 2021-02-21 RX ADMIN — Medication 10 ML: at 20:46

## 2021-02-21 RX ADMIN — BUDESONIDE AND FORMOTEROL FUMARATE DIHYDRATE 2 PUFF: 80; 4.5 AEROSOL RESPIRATORY (INHALATION) at 18:24

## 2021-02-21 RX ADMIN — Medication 10 ML: at 08:19

## 2021-02-21 RX ADMIN — METOPROLOL SUCCINATE 25 MG: 25 TABLET, EXTENDED RELEASE ORAL at 08:19

## 2021-02-21 RX ADMIN — INSULIN LISPRO 2 UNITS: 100 INJECTION, SOLUTION INTRAVENOUS; SUBCUTANEOUS at 11:40

## 2021-02-21 RX ADMIN — ENOXAPARIN SODIUM 130 MG: 150 INJECTION SUBCUTANEOUS at 11:42

## 2021-02-22 ENCOUNTER — HOSPITAL ENCOUNTER (OUTPATIENT)
Dept: CARDIOLOGY | Facility: HOSPITAL | Age: 71
Discharge: HOME | End: 2021-02-22

## 2021-02-22 ENCOUNTER — ANESTHESIA (OUTPATIENT)
Dept: CARDIOLOGY | Facility: HOSPITAL | Age: 71
End: 2021-02-22

## 2021-02-22 ENCOUNTER — ANESTHESIA EVENT (OUTPATIENT)
Dept: CARDIOLOGY | Facility: HOSPITAL | Age: 71
End: 2021-02-22

## 2021-02-22 VITALS
DIASTOLIC BLOOD PRESSURE: 75 MMHG | RESPIRATION RATE: 21 BRPM | HEART RATE: 88 BPM | OXYGEN SATURATION: 97 % | SYSTOLIC BLOOD PRESSURE: 118 MMHG

## 2021-02-22 VITALS — SYSTOLIC BLOOD PRESSURE: 81 MMHG | OXYGEN SATURATION: 93 % | DIASTOLIC BLOOD PRESSURE: 44 MMHG

## 2021-02-22 DIAGNOSIS — I42.0 DILATED CARDIOMYOPATHY (HCC): ICD-10-CM

## 2021-02-22 DIAGNOSIS — I10 ESSENTIAL HYPERTENSION: ICD-10-CM

## 2021-02-22 DIAGNOSIS — E11.9 TYPE 2 DIABETES MELLITUS WITHOUT COMPLICATION, WITHOUT LONG-TERM CURRENT USE OF INSULIN (HCC): ICD-10-CM

## 2021-02-22 DIAGNOSIS — I36.1 NONRHEUMATIC TRICUSPID VALVE REGURGITATION: ICD-10-CM

## 2021-02-22 DIAGNOSIS — I34.0 NONRHEUMATIC MITRAL VALVE REGURGITATION: ICD-10-CM

## 2021-02-22 DIAGNOSIS — I50.42 CHRONIC COMBINED SYSTOLIC AND DIASTOLIC CONGESTIVE HEART FAILURE (HCC): ICD-10-CM

## 2021-02-22 LAB
ALBUMIN SERPL-MCNC: 3.6 G/DL (ref 3.5–5.2)
ANION GAP SERPL CALCULATED.3IONS-SCNC: 13 MMOL/L (ref 5–15)
BUN SERPL-MCNC: 58 MG/DL (ref 8–23)
BUN/CREAT SERPL: 25.3 (ref 7–25)
CALCIUM SPEC-SCNC: 9.6 MG/DL (ref 8.6–10.5)
CHLORIDE SERPL-SCNC: 102 MMOL/L (ref 98–107)
CO2 SERPL-SCNC: 25 MMOL/L (ref 22–29)
CREAT SERPL-MCNC: 2.29 MG/DL (ref 0.57–1)
DEPRECATED RDW RBC AUTO: 49.4 FL (ref 37–54)
ERYTHROCYTE [DISTWIDTH] IN BLOOD BY AUTOMATED COUNT: 16.1 % (ref 12.3–15.4)
GFR SERPL CREATININE-BSD FRML MDRD: 26 ML/MIN/1.73
GLUCOSE BLDC GLUCOMTR-MCNC: 107 MG/DL (ref 70–105)
GLUCOSE BLDC GLUCOMTR-MCNC: 126 MG/DL (ref 70–105)
GLUCOSE BLDC GLUCOMTR-MCNC: 138 MG/DL (ref 70–105)
GLUCOSE BLDC GLUCOMTR-MCNC: 186 MG/DL (ref 70–105)
GLUCOSE SERPL-MCNC: 109 MG/DL (ref 65–99)
HCT VFR BLD AUTO: 36.4 % (ref 34–46.6)
HGB BLD-MCNC: 11.5 G/DL (ref 12–15.9)
LV EF 2D ECHO EST: 40 %
MAGNESIUM SERPL-MCNC: 2.2 MG/DL (ref 1.6–2.4)
MCH RBC QN AUTO: 28 PG (ref 26.6–33)
MCHC RBC AUTO-ENTMCNC: 31.5 G/DL (ref 31.5–35.7)
MCV RBC AUTO: 89 FL (ref 79–97)
PHOSPHATE SERPL-MCNC: 5.1 MG/DL (ref 2.5–4.5)
PLATELET # BLD AUTO: 214 10*3/MM3 (ref 140–450)
PMV BLD AUTO: 10.6 FL (ref 6–12)
POTASSIUM SERPL-SCNC: 4.8 MMOL/L (ref 3.5–5.2)
RBC # BLD AUTO: 4.09 10*6/MM3 (ref 3.77–5.28)
SODIUM SERPL-SCNC: 140 MMOL/L (ref 136–145)
WBC # BLD AUTO: 12.3 10*3/MM3 (ref 3.4–10.8)

## 2021-02-22 PROCEDURE — 99232 SBSQ HOSP IP/OBS MODERATE 35: CPT | Performed by: INTERNAL MEDICINE

## 2021-02-22 PROCEDURE — 99233 SBSQ HOSP IP/OBS HIGH 50: CPT | Performed by: INTERNAL MEDICINE

## 2021-02-22 PROCEDURE — 93320 DOPPLER ECHO COMPLETE: CPT

## 2021-02-22 PROCEDURE — B24BZZ4 ULTRASONOGRAPHY OF HEART WITH AORTA, TRANSESOPHAGEAL: ICD-10-PCS | Performed by: INTERNAL MEDICINE

## 2021-02-22 PROCEDURE — C1894 INTRO/SHEATH, NON-LASER: HCPCS | Performed by: INTERNAL MEDICINE

## 2021-02-22 PROCEDURE — B2151ZZ FLUOROSCOPY OF LEFT HEART USING LOW OSMOLAR CONTRAST: ICD-10-PCS | Performed by: INTERNAL MEDICINE

## 2021-02-22 PROCEDURE — 93460 R&L HRT ART/VENTRICLE ANGIO: CPT | Performed by: INTERNAL MEDICINE

## 2021-02-22 PROCEDURE — 63710000001 DIPHENHYDRAMINE PER 50 MG: Performed by: INTERNAL MEDICINE

## 2021-02-22 PROCEDURE — 25010000002 ONDANSETRON PER 1 MG: Performed by: INTERNAL MEDICINE

## 2021-02-22 PROCEDURE — 25010000002 PROPOFOL 10 MG/ML EMULSION: Performed by: ANESTHESIOLOGY

## 2021-02-22 PROCEDURE — 94664 DEMO&/EVAL PT USE INHALER: CPT

## 2021-02-22 PROCEDURE — 94760 N-INVAS EAR/PLS OXIMETRY 1: CPT

## 2021-02-22 PROCEDURE — 25010000003 LIDOCAINE 1 % SOLUTION: Performed by: INTERNAL MEDICINE

## 2021-02-22 PROCEDURE — 99152 MOD SED SAME PHYS/QHP 5/>YRS: CPT | Performed by: INTERNAL MEDICINE

## 2021-02-22 PROCEDURE — 94799 UNLISTED PULMONARY SVC/PX: CPT

## 2021-02-22 PROCEDURE — 93325 DOPPLER ECHO COLOR FLOW MAPG: CPT | Performed by: INTERNAL MEDICINE

## 2021-02-22 PROCEDURE — 94640 AIRWAY INHALATION TREATMENT: CPT

## 2021-02-22 PROCEDURE — 0 IOPAMIDOL PER 1 ML: Performed by: INTERNAL MEDICINE

## 2021-02-22 PROCEDURE — C1769 GUIDE WIRE: HCPCS | Performed by: INTERNAL MEDICINE

## 2021-02-22 PROCEDURE — 99153 MOD SED SAME PHYS/QHP EA: CPT | Performed by: INTERNAL MEDICINE

## 2021-02-22 PROCEDURE — 82962 GLUCOSE BLOOD TEST: CPT

## 2021-02-22 PROCEDURE — B2111ZZ FLUOROSCOPY OF MULTIPLE CORONARY ARTERIES USING LOW OSMOLAR CONTRAST: ICD-10-PCS | Performed by: INTERNAL MEDICINE

## 2021-02-22 PROCEDURE — 93320 DOPPLER ECHO COMPLETE: CPT | Performed by: INTERNAL MEDICINE

## 2021-02-22 PROCEDURE — 93312 ECHO TRANSESOPHAGEAL: CPT

## 2021-02-22 PROCEDURE — 93312 ECHO TRANSESOPHAGEAL: CPT | Performed by: INTERNAL MEDICINE

## 2021-02-22 PROCEDURE — 85027 COMPLETE CBC AUTOMATED: CPT | Performed by: INTERNAL MEDICINE

## 2021-02-22 PROCEDURE — 83735 ASSAY OF MAGNESIUM: CPT | Performed by: NURSE PRACTITIONER

## 2021-02-22 PROCEDURE — 4A023N8 MEASUREMENT OF CARDIAC SAMPLING AND PRESSURE, BILATERAL, PERCUTANEOUS APPROACH: ICD-10-PCS | Performed by: INTERNAL MEDICINE

## 2021-02-22 PROCEDURE — 25010000002 ONDANSETRON PER 1 MG: Performed by: NURSE PRACTITIONER

## 2021-02-22 PROCEDURE — 80069 RENAL FUNCTION PANEL: CPT | Performed by: INTERNAL MEDICINE

## 2021-02-22 PROCEDURE — 93325 DOPPLER ECHO COLOR FLOW MAPG: CPT

## 2021-02-22 RX ORDER — ONDANSETRON 2 MG/ML
4 INJECTION INTRAMUSCULAR; INTRAVENOUS EVERY 6 HOURS PRN
Status: DISCONTINUED | OUTPATIENT
Start: 2021-02-22 | End: 2021-03-02 | Stop reason: HOSPADM

## 2021-02-22 RX ORDER — SODIUM CHLORIDE 9 MG/ML
100 INJECTION, SOLUTION INTRAVENOUS CONTINUOUS
Status: DISPENSED | OUTPATIENT
Start: 2021-02-22 | End: 2021-02-22

## 2021-02-22 RX ORDER — PROPOFOL 10 MG/ML
VIAL (ML) INTRAVENOUS AS NEEDED
Status: DISCONTINUED | OUTPATIENT
Start: 2021-02-22 | End: 2021-02-22 | Stop reason: SURG

## 2021-02-22 RX ORDER — PROPOFOL 10 MG/ML
VIAL (ML) INTRAVENOUS AS NEEDED
Status: DISCONTINUED | OUTPATIENT
Start: 2021-02-22 | End: 2021-02-22

## 2021-02-22 RX ORDER — DIPHENHYDRAMINE HCL 25 MG
25 CAPSULE ORAL ONCE
Status: COMPLETED | OUTPATIENT
Start: 2021-02-22 | End: 2021-02-22

## 2021-02-22 RX ORDER — ONDANSETRON 4 MG/1
4 TABLET, FILM COATED ORAL EVERY 6 HOURS PRN
Status: DISCONTINUED | OUTPATIENT
Start: 2021-02-22 | End: 2021-03-02 | Stop reason: HOSPADM

## 2021-02-22 RX ORDER — ACETAMINOPHEN 325 MG/1
650 TABLET ORAL EVERY 4 HOURS PRN
Status: DISCONTINUED | OUTPATIENT
Start: 2021-02-22 | End: 2021-02-26 | Stop reason: SDUPTHER

## 2021-02-22 RX ORDER — LIDOCAINE HYDROCHLORIDE 10 MG/ML
INJECTION, SOLUTION INFILTRATION; PERINEURAL AS NEEDED
Status: DISCONTINUED | OUTPATIENT
Start: 2021-02-22 | End: 2021-02-22 | Stop reason: HOSPADM

## 2021-02-22 RX ORDER — SODIUM CHLORIDE 9 MG/ML
250 INJECTION, SOLUTION INTRAVENOUS ONCE AS NEEDED
Status: DISCONTINUED | OUTPATIENT
Start: 2021-02-22 | End: 2021-03-02 | Stop reason: HOSPADM

## 2021-02-22 RX ORDER — ALPRAZOLAM 0.25 MG/1
0.25 TABLET ORAL ONCE
Status: COMPLETED | OUTPATIENT
Start: 2021-02-22 | End: 2021-02-22

## 2021-02-22 RX ORDER — DIPHENHYDRAMINE HCL 25 MG
25 CAPSULE ORAL EVERY 6 HOURS PRN
Status: DISCONTINUED | OUTPATIENT
Start: 2021-02-22 | End: 2021-03-02 | Stop reason: HOSPADM

## 2021-02-22 RX ORDER — ALUMINA, MAGNESIA, AND SIMETHICONE 2400; 2400; 240 MG/30ML; MG/30ML; MG/30ML
15 SUSPENSION ORAL EVERY 6 HOURS PRN
Status: DISCONTINUED | OUTPATIENT
Start: 2021-02-22 | End: 2021-03-02 | Stop reason: HOSPADM

## 2021-02-22 RX ORDER — ONDANSETRON 2 MG/ML
INJECTION INTRAMUSCULAR; INTRAVENOUS AS NEEDED
Status: DISCONTINUED | OUTPATIENT
Start: 2021-02-22 | End: 2021-02-22 | Stop reason: HOSPADM

## 2021-02-22 RX ADMIN — PROPOFOL 20 MG: 10 INJECTION, EMULSION INTRAVENOUS at 15:12

## 2021-02-22 RX ADMIN — BUDESONIDE AND FORMOTEROL FUMARATE DIHYDRATE 2 PUFF: 80; 4.5 AEROSOL RESPIRATORY (INHALATION) at 06:26

## 2021-02-22 RX ADMIN — METOPROLOL SUCCINATE 25 MG: 25 TABLET, EXTENDED RELEASE ORAL at 09:10

## 2021-02-22 RX ADMIN — CANAGLIFLOZIN 100 MG: 100 TABLET, FILM COATED ORAL at 11:48

## 2021-02-22 RX ADMIN — Medication 600 MG: at 15:40

## 2021-02-22 RX ADMIN — TORSEMIDE 20 MG: 10 TABLET ORAL at 09:11

## 2021-02-22 RX ADMIN — PROPOFOL 10 MG: 10 INJECTION, EMULSION INTRAVENOUS at 15:21

## 2021-02-22 RX ADMIN — Medication 10 ML: at 21:30

## 2021-02-22 RX ADMIN — PROPOFOL 20 MG: 10 INJECTION, EMULSION INTRAVENOUS at 15:18

## 2021-02-22 RX ADMIN — PROPOFOL 20 MG: 10 INJECTION, EMULSION INTRAVENOUS at 15:14

## 2021-02-22 RX ADMIN — PROPOFOL 10 MG: 10 INJECTION, EMULSION INTRAVENOUS at 15:27

## 2021-02-22 RX ADMIN — SODIUM CHLORIDE 100 ML/HR: 9 INJECTION, SOLUTION INTRAVENOUS at 14:55

## 2021-02-22 RX ADMIN — PROPOFOL 10 MG: 10 INJECTION, EMULSION INTRAVENOUS at 15:23

## 2021-02-22 RX ADMIN — PROPOFOL 10 MG: 10 INJECTION, EMULSION INTRAVENOUS at 15:25

## 2021-02-22 RX ADMIN — PROPOFOL 20 MG: 10 INJECTION, EMULSION INTRAVENOUS at 15:16

## 2021-02-22 RX ADMIN — Medication 600 MG: at 21:30

## 2021-02-22 RX ADMIN — PROPOFOL 10 MG: 10 INJECTION, EMULSION INTRAVENOUS at 15:20

## 2021-02-22 RX ADMIN — SPIRONOLACTONE 25 MG: 25 TABLET ORAL at 09:11

## 2021-02-22 RX ADMIN — BUDESONIDE AND FORMOTEROL FUMARATE DIHYDRATE 2 PUFF: 80; 4.5 AEROSOL RESPIRATORY (INHALATION) at 18:52

## 2021-02-22 RX ADMIN — Medication 10 ML: at 09:12

## 2021-02-22 RX ADMIN — ASPIRIN 325 MG: 325 TABLET, COATED ORAL at 09:11

## 2021-02-22 RX ADMIN — PROPOFOL 70 MG: 10 INJECTION, EMULSION INTRAVENOUS at 15:10

## 2021-02-22 RX ADMIN — DIPHENHYDRAMINE HYDROCHLORIDE 25 MG: 25 CAPSULE ORAL at 21:31

## 2021-02-22 RX ADMIN — ALPRAZOLAM 0.25 MG: 0.25 TABLET ORAL at 21:31

## 2021-02-22 RX ADMIN — ONDANSETRON 4 MG: 2 INJECTION, SOLUTION INTRAMUSCULAR; INTRAVENOUS at 09:12

## 2021-02-22 NOTE — ANESTHESIA PREPROCEDURE EVALUATION
Anesthesia Evaluation     NPO Solid Status: > 8 hours  NPO Liquid Status: > 8 hours           Airway   Mallampati: II  TM distance: >3 FB  Neck ROM: full  No difficulty expected  Dental - normal exam     Pulmonary - normal exam   (+) pulmonary embolism,   Cardiovascular - normal exam  Exercise tolerance: poor (<4 METS)    (+) valvular problems/murmurs MR and TI, CHF ,       Neuro/Psych  (+) numbness,     GI/Hepatic/Renal/Endo    (+) morbid obesity,  renal disease, diabetes mellitus,     Musculoskeletal     Abdominal  - normal exam    Bowel sounds: normal.   Substance History      OB/GYN          Other   arthritis,                      Anesthesia Plan    ASA 4     MAC     intravenous induction     Anesthetic plan, all risks, benefits, and alternatives have been provided, discussed and informed consent has been obtained with: patient.

## 2021-02-23 LAB
ALBUMIN SERPL-MCNC: 3.7 G/DL (ref 3.5–5.2)
ANION GAP SERPL CALCULATED.3IONS-SCNC: 11 MMOL/L (ref 5–15)
BASOPHILS # BLD AUTO: 0.1 10*3/MM3 (ref 0–0.2)
BASOPHILS NFR BLD AUTO: 0.8 % (ref 0–1.5)
BUN SERPL-MCNC: 57 MG/DL (ref 8–23)
BUN/CREAT SERPL: 28.1 (ref 7–25)
CALCIUM SPEC-SCNC: 9.1 MG/DL (ref 8.6–10.5)
CHLORIDE SERPL-SCNC: 103 MMOL/L (ref 98–107)
CO2 SERPL-SCNC: 24 MMOL/L (ref 22–29)
CREAT SERPL-MCNC: 2.03 MG/DL (ref 0.57–1)
DEPRECATED RDW RBC AUTO: 49.9 FL (ref 37–54)
EOSINOPHIL # BLD AUTO: 0 10*3/MM3 (ref 0–0.4)
EOSINOPHIL NFR BLD AUTO: 0 % (ref 0.3–6.2)
ERYTHROCYTE [DISTWIDTH] IN BLOOD BY AUTOMATED COUNT: 16.1 % (ref 12.3–15.4)
GFR SERPL CREATININE-BSD FRML MDRD: 29 ML/MIN/1.73
GLUCOSE BLDC GLUCOMTR-MCNC: 130 MG/DL (ref 70–105)
GLUCOSE BLDC GLUCOMTR-MCNC: 132 MG/DL (ref 70–105)
GLUCOSE BLDC GLUCOMTR-MCNC: 134 MG/DL (ref 70–105)
GLUCOSE BLDC GLUCOMTR-MCNC: 173 MG/DL (ref 70–105)
GLUCOSE SERPL-MCNC: 133 MG/DL (ref 65–99)
HCT VFR BLD AUTO: 35.4 % (ref 34–46.6)
HGB BLD-MCNC: 11.7 G/DL (ref 12–15.9)
LYMPHOCYTES # BLD AUTO: 2 10*3/MM3 (ref 0.7–3.1)
LYMPHOCYTES NFR BLD AUTO: 21.9 % (ref 19.6–45.3)
MAGNESIUM SERPL-MCNC: 2.2 MG/DL (ref 1.6–2.4)
MCH RBC QN AUTO: 29.3 PG (ref 26.6–33)
MCHC RBC AUTO-ENTMCNC: 33.2 G/DL (ref 31.5–35.7)
MCV RBC AUTO: 88.3 FL (ref 79–97)
MONOCYTES # BLD AUTO: 0.9 10*3/MM3 (ref 0.1–0.9)
MONOCYTES NFR BLD AUTO: 9.9 % (ref 5–12)
NEUTROPHILS NFR BLD AUTO: 6.1 10*3/MM3 (ref 1.7–7)
NEUTROPHILS NFR BLD AUTO: 67.4 % (ref 42.7–76)
NRBC BLD AUTO-RTO: 0.1 /100 WBC (ref 0–0.2)
PHOSPHATE SERPL-MCNC: 4 MG/DL (ref 2.5–4.5)
PLATELET # BLD AUTO: 206 10*3/MM3 (ref 140–450)
PMV BLD AUTO: 9.9 FL (ref 6–12)
POTASSIUM SERPL-SCNC: 4.2 MMOL/L (ref 3.5–5.2)
RBC # BLD AUTO: 4.01 10*6/MM3 (ref 3.77–5.28)
SODIUM SERPL-SCNC: 138 MMOL/L (ref 136–145)
WBC # BLD AUTO: 9 10*3/MM3 (ref 3.4–10.8)

## 2021-02-23 PROCEDURE — 25010000002 ONDANSETRON PER 1 MG: Performed by: INTERNAL MEDICINE

## 2021-02-23 PROCEDURE — 94799 UNLISTED PULMONARY SVC/PX: CPT

## 2021-02-23 PROCEDURE — 25010000002 ENOXAPARIN PER 10 MG: Performed by: INTERNAL MEDICINE

## 2021-02-23 PROCEDURE — 83735 ASSAY OF MAGNESIUM: CPT | Performed by: INTERNAL MEDICINE

## 2021-02-23 PROCEDURE — 82962 GLUCOSE BLOOD TEST: CPT

## 2021-02-23 PROCEDURE — 80069 RENAL FUNCTION PANEL: CPT | Performed by: INTERNAL MEDICINE

## 2021-02-23 PROCEDURE — 99232 SBSQ HOSP IP/OBS MODERATE 35: CPT | Performed by: INTERNAL MEDICINE

## 2021-02-23 PROCEDURE — 99024 POSTOP FOLLOW-UP VISIT: CPT | Performed by: THORACIC SURGERY (CARDIOTHORACIC VASCULAR SURGERY)

## 2021-02-23 PROCEDURE — 85025 COMPLETE CBC W/AUTO DIFF WBC: CPT | Performed by: INTERNAL MEDICINE

## 2021-02-23 PROCEDURE — 63710000001 INSULIN LISPRO (HUMAN) PER 5 UNITS: Performed by: INTERNAL MEDICINE

## 2021-02-23 RX ADMIN — TORSEMIDE 20 MG: 10 TABLET ORAL at 08:10

## 2021-02-23 RX ADMIN — ASPIRIN 325 MG: 325 TABLET, COATED ORAL at 08:09

## 2021-02-23 RX ADMIN — BUDESONIDE AND FORMOTEROL FUMARATE DIHYDRATE 2 PUFF: 80; 4.5 AEROSOL RESPIRATORY (INHALATION) at 19:15

## 2021-02-23 RX ADMIN — Medication 10 ML: at 08:10

## 2021-02-23 RX ADMIN — SPIRONOLACTONE 25 MG: 25 TABLET ORAL at 08:10

## 2021-02-23 RX ADMIN — ONDANSETRON 4 MG: 2 INJECTION, SOLUTION INTRAMUSCULAR; INTRAVENOUS at 10:14

## 2021-02-23 RX ADMIN — ENOXAPARIN SODIUM 130 MG: 150 INJECTION SUBCUTANEOUS at 13:32

## 2021-02-23 RX ADMIN — METOPROLOL SUCCINATE 25 MG: 25 TABLET, EXTENDED RELEASE ORAL at 08:10

## 2021-02-23 RX ADMIN — ALLOPURINOL 100 MG: 100 TABLET ORAL at 08:10

## 2021-02-23 RX ADMIN — INSULIN LISPRO 2 UNITS: 100 INJECTION, SOLUTION INTRAVENOUS; SUBCUTANEOUS at 08:10

## 2021-02-23 RX ADMIN — Medication 600 MG: at 08:09

## 2021-02-23 RX ADMIN — Medication 600 MG: at 20:13

## 2021-02-23 RX ADMIN — Medication 10 ML: at 20:13

## 2021-02-24 LAB
ALBUMIN SERPL-MCNC: 3.5 G/DL (ref 3.5–5.2)
ANION GAP SERPL CALCULATED.3IONS-SCNC: 16 MMOL/L (ref 5–15)
BUN SERPL-MCNC: 53 MG/DL (ref 8–23)
BUN/CREAT SERPL: 28.8 (ref 7–25)
CALCIUM SPEC-SCNC: 9.5 MG/DL (ref 8.6–10.5)
CHLORIDE SERPL-SCNC: 103 MMOL/L (ref 98–107)
CO2 SERPL-SCNC: 19 MMOL/L (ref 22–29)
CREAT SERPL-MCNC: 1.84 MG/DL (ref 0.57–1)
DEPRECATED RDW RBC AUTO: 49.9 FL (ref 37–54)
ERYTHROCYTE [DISTWIDTH] IN BLOOD BY AUTOMATED COUNT: 15.9 % (ref 12.3–15.4)
GFR SERPL CREATININE-BSD FRML MDRD: 33 ML/MIN/1.73
GLUCOSE BLDC GLUCOMTR-MCNC: 121 MG/DL (ref 70–105)
GLUCOSE BLDC GLUCOMTR-MCNC: 121 MG/DL (ref 70–105)
GLUCOSE BLDC GLUCOMTR-MCNC: 147 MG/DL (ref 70–105)
GLUCOSE BLDC GLUCOMTR-MCNC: 148 MG/DL (ref 70–105)
GLUCOSE SERPL-MCNC: 113 MG/DL (ref 65–99)
HCT VFR BLD AUTO: 35.8 % (ref 34–46.6)
HGB BLD-MCNC: 11.8 G/DL (ref 12–15.9)
MAGNESIUM SERPL-MCNC: 2.3 MG/DL (ref 1.6–2.4)
MCH RBC QN AUTO: 29.4 PG (ref 26.6–33)
MCHC RBC AUTO-ENTMCNC: 33.1 G/DL (ref 31.5–35.7)
MCV RBC AUTO: 88.8 FL (ref 79–97)
PHOSPHATE SERPL-MCNC: 3.1 MG/DL (ref 2.5–4.5)
PLATELET # BLD AUTO: 226 10*3/MM3 (ref 140–450)
PMV BLD AUTO: 10 FL (ref 6–12)
POTASSIUM SERPL-SCNC: 4.1 MMOL/L (ref 3.5–5.2)
RBC # BLD AUTO: 4.03 10*6/MM3 (ref 3.77–5.28)
SODIUM SERPL-SCNC: 138 MMOL/L (ref 136–145)
WBC # BLD AUTO: 8.4 10*3/MM3 (ref 3.4–10.8)

## 2021-02-24 PROCEDURE — 99232 SBSQ HOSP IP/OBS MODERATE 35: CPT | Performed by: INTERNAL MEDICINE

## 2021-02-24 PROCEDURE — 94799 UNLISTED PULMONARY SVC/PX: CPT

## 2021-02-24 PROCEDURE — 25010000002 ENOXAPARIN PER 10 MG: Performed by: INTERNAL MEDICINE

## 2021-02-24 PROCEDURE — 85027 COMPLETE CBC AUTOMATED: CPT | Performed by: INTERNAL MEDICINE

## 2021-02-24 PROCEDURE — 83735 ASSAY OF MAGNESIUM: CPT | Performed by: INTERNAL MEDICINE

## 2021-02-24 PROCEDURE — 80069 RENAL FUNCTION PANEL: CPT | Performed by: INTERNAL MEDICINE

## 2021-02-24 PROCEDURE — 82962 GLUCOSE BLOOD TEST: CPT

## 2021-02-24 RX ADMIN — ALLOPURINOL 100 MG: 100 TABLET ORAL at 08:32

## 2021-02-24 RX ADMIN — ENOXAPARIN SODIUM 130 MG: 150 INJECTION SUBCUTANEOUS at 13:26

## 2021-02-24 RX ADMIN — BUDESONIDE AND FORMOTEROL FUMARATE DIHYDRATE 2 PUFF: 80; 4.5 AEROSOL RESPIRATORY (INHALATION) at 06:58

## 2021-02-24 RX ADMIN — ASPIRIN 325 MG: 325 TABLET, COATED ORAL at 08:32

## 2021-02-24 RX ADMIN — SPIRONOLACTONE 25 MG: 25 TABLET ORAL at 08:32

## 2021-02-24 RX ADMIN — Medication 10 ML: at 20:14

## 2021-02-24 RX ADMIN — METOPROLOL SUCCINATE 25 MG: 25 TABLET, EXTENDED RELEASE ORAL at 08:32

## 2021-02-24 RX ADMIN — BUDESONIDE AND FORMOTEROL FUMARATE DIHYDRATE 2 PUFF: 80; 4.5 AEROSOL RESPIRATORY (INHALATION) at 19:20

## 2021-02-24 RX ADMIN — TORSEMIDE 20 MG: 10 TABLET ORAL at 08:32

## 2021-02-24 RX ADMIN — Medication 10 ML: at 08:32

## 2021-02-25 ENCOUNTER — APPOINTMENT (OUTPATIENT)
Dept: CT IMAGING | Facility: HOSPITAL | Age: 71
End: 2021-02-25

## 2021-02-25 LAB
ALBUMIN SERPL-MCNC: 3.4 G/DL (ref 3.5–5.2)
ANION GAP SERPL CALCULATED.3IONS-SCNC: 14 MMOL/L (ref 5–15)
APTT PPP: 28.6 SECONDS (ref 61–76.5)
BASOPHILS # BLD AUTO: 0.1 10*3/MM3 (ref 0–0.2)
BASOPHILS NFR BLD AUTO: 0.8 % (ref 0–1.5)
BUN SERPL-MCNC: 51 MG/DL (ref 8–23)
BUN/CREAT SERPL: 29.5 (ref 7–25)
CALCIUM SPEC-SCNC: 9.6 MG/DL (ref 8.6–10.5)
CHLORIDE SERPL-SCNC: 105 MMOL/L (ref 98–107)
CO2 SERPL-SCNC: 23 MMOL/L (ref 22–29)
CREAT SERPL-MCNC: 1.73 MG/DL (ref 0.57–1)
DEPRECATED RDW RBC AUTO: 51.2 FL (ref 37–54)
DEPRECATED RDW RBC AUTO: 52.5 FL (ref 37–54)
EOSINOPHIL # BLD AUTO: 0 10*3/MM3 (ref 0–0.4)
EOSINOPHIL NFR BLD AUTO: 0 % (ref 0.3–6.2)
ERYTHROCYTE [DISTWIDTH] IN BLOOD BY AUTOMATED COUNT: 16.5 % (ref 12.3–15.4)
ERYTHROCYTE [DISTWIDTH] IN BLOOD BY AUTOMATED COUNT: 16.7 % (ref 12.3–15.4)
GFR SERPL CREATININE-BSD FRML MDRD: 35 ML/MIN/1.73
GLUCOSE BLDC GLUCOMTR-MCNC: 118 MG/DL (ref 70–105)
GLUCOSE BLDC GLUCOMTR-MCNC: 125 MG/DL (ref 70–105)
GLUCOSE BLDC GLUCOMTR-MCNC: 148 MG/DL (ref 70–105)
GLUCOSE BLDC GLUCOMTR-MCNC: 149 MG/DL (ref 70–105)
GLUCOSE SERPL-MCNC: 100 MG/DL (ref 65–99)
HCT VFR BLD AUTO: 38 % (ref 34–46.6)
HCT VFR BLD AUTO: 38.7 % (ref 34–46.6)
HGB BLD-MCNC: 12.3 G/DL (ref 12–15.9)
HGB BLD-MCNC: 12.6 G/DL (ref 12–15.9)
INR PPP: 1.09 (ref 0.93–1.1)
LYMPHOCYTES # BLD AUTO: 1.9 10*3/MM3 (ref 0.7–3.1)
LYMPHOCYTES NFR BLD AUTO: 19.4 % (ref 19.6–45.3)
MAGNESIUM SERPL-MCNC: 2.4 MG/DL (ref 1.6–2.4)
MCH RBC QN AUTO: 28.6 PG (ref 26.6–33)
MCH RBC QN AUTO: 29.6 PG (ref 26.6–33)
MCHC RBC AUTO-ENTMCNC: 31.8 G/DL (ref 31.5–35.7)
MCHC RBC AUTO-ENTMCNC: 33.2 G/DL (ref 31.5–35.7)
MCV RBC AUTO: 89.2 FL (ref 79–97)
MCV RBC AUTO: 90.1 FL (ref 79–97)
MONOCYTES # BLD AUTO: 0.9 10*3/MM3 (ref 0.1–0.9)
MONOCYTES NFR BLD AUTO: 9.2 % (ref 5–12)
NEUTROPHILS NFR BLD AUTO: 6.9 10*3/MM3 (ref 1.7–7)
NEUTROPHILS NFR BLD AUTO: 70.6 % (ref 42.7–76)
NRBC BLD AUTO-RTO: 0.2 /100 WBC (ref 0–0.2)
PHOSPHATE SERPL-MCNC: 3.4 MG/DL (ref 2.5–4.5)
PLATELET # BLD AUTO: 230 10*3/MM3 (ref 140–450)
PLATELET # BLD AUTO: 247 10*3/MM3 (ref 140–450)
PMV BLD AUTO: 10.6 FL (ref 6–12)
PMV BLD AUTO: 9.3 FL (ref 6–12)
POTASSIUM SERPL-SCNC: 4.1 MMOL/L (ref 3.5–5.2)
PROTHROMBIN TIME: 11.9 SECONDS (ref 9.6–11.7)
RBC # BLD AUTO: 4.26 10*6/MM3 (ref 3.77–5.28)
RBC # BLD AUTO: 4.3 10*6/MM3 (ref 3.77–5.28)
SODIUM SERPL-SCNC: 142 MMOL/L (ref 136–145)
WBC # BLD AUTO: 9.8 10*3/MM3 (ref 3.4–10.8)
WBC # BLD AUTO: 9.9 10*3/MM3 (ref 3.4–10.8)

## 2021-02-25 PROCEDURE — 83735 ASSAY OF MAGNESIUM: CPT | Performed by: INTERNAL MEDICINE

## 2021-02-25 PROCEDURE — 0 IOPAMIDOL PER 1 ML: Performed by: INTERNAL MEDICINE

## 2021-02-25 PROCEDURE — 94799 UNLISTED PULMONARY SVC/PX: CPT

## 2021-02-25 PROCEDURE — 80069 RENAL FUNCTION PANEL: CPT | Performed by: INTERNAL MEDICINE

## 2021-02-25 PROCEDURE — 25010000002 ENOXAPARIN PER 10 MG: Performed by: INTERNAL MEDICINE

## 2021-02-25 PROCEDURE — 71275 CT ANGIOGRAPHY CHEST: CPT

## 2021-02-25 PROCEDURE — 85610 PROTHROMBIN TIME: CPT | Performed by: STUDENT IN AN ORGANIZED HEALTH CARE EDUCATION/TRAINING PROGRAM

## 2021-02-25 PROCEDURE — 85027 COMPLETE CBC AUTOMATED: CPT | Performed by: INTERNAL MEDICINE

## 2021-02-25 PROCEDURE — 99232 SBSQ HOSP IP/OBS MODERATE 35: CPT | Performed by: INTERNAL MEDICINE

## 2021-02-25 PROCEDURE — 85025 COMPLETE CBC W/AUTO DIFF WBC: CPT | Performed by: STUDENT IN AN ORGANIZED HEALTH CARE EDUCATION/TRAINING PROGRAM

## 2021-02-25 PROCEDURE — 85730 THROMBOPLASTIN TIME PARTIAL: CPT | Performed by: STUDENT IN AN ORGANIZED HEALTH CARE EDUCATION/TRAINING PROGRAM

## 2021-02-25 PROCEDURE — 82962 GLUCOSE BLOOD TEST: CPT

## 2021-02-25 RX ORDER — GUAIFENESIN 600 MG/1
600 TABLET, EXTENDED RELEASE ORAL EVERY 12 HOURS SCHEDULED
Status: DISCONTINUED | OUTPATIENT
Start: 2021-02-25 | End: 2021-02-28

## 2021-02-25 RX ORDER — HEPARIN SODIUM 10000 [USP'U]/100ML
11.9 INJECTION, SOLUTION INTRAVENOUS
Status: DISCONTINUED | OUTPATIENT
Start: 2021-02-26 | End: 2021-03-02

## 2021-02-25 RX ADMIN — Medication 10 ML: at 20:47

## 2021-02-25 RX ADMIN — GUAIFENESIN 600 MG: 600 TABLET, EXTENDED RELEASE ORAL at 20:48

## 2021-02-25 RX ADMIN — ALLOPURINOL 100 MG: 100 TABLET ORAL at 08:14

## 2021-02-25 RX ADMIN — BUDESONIDE AND FORMOTEROL FUMARATE DIHYDRATE 2 PUFF: 80; 4.5 AEROSOL RESPIRATORY (INHALATION) at 07:14

## 2021-02-25 RX ADMIN — BUDESONIDE AND FORMOTEROL FUMARATE DIHYDRATE 2 PUFF: 80; 4.5 AEROSOL RESPIRATORY (INHALATION) at 18:25

## 2021-02-25 RX ADMIN — METOPROLOL SUCCINATE 25 MG: 25 TABLET, EXTENDED RELEASE ORAL at 08:13

## 2021-02-25 RX ADMIN — SPIRONOLACTONE 25 MG: 25 TABLET ORAL at 08:14

## 2021-02-25 RX ADMIN — Medication 10 ML: at 08:14

## 2021-02-25 RX ADMIN — ENOXAPARIN SODIUM 130 MG: 150 INJECTION SUBCUTANEOUS at 11:49

## 2021-02-25 RX ADMIN — TORSEMIDE 20 MG: 10 TABLET ORAL at 08:13

## 2021-02-25 RX ADMIN — IOPAMIDOL 100 ML: 755 INJECTION, SOLUTION INTRAVENOUS at 17:54

## 2021-02-25 RX ADMIN — ASPIRIN 325 MG: 325 TABLET, COATED ORAL at 08:13

## 2021-02-26 ENCOUNTER — APPOINTMENT (OUTPATIENT)
Dept: CARDIOLOGY | Facility: HOSPITAL | Age: 71
End: 2021-02-26

## 2021-02-26 LAB
ALBUMIN SERPL-MCNC: 3.4 G/DL (ref 3.5–5.2)
ANION GAP SERPL CALCULATED.3IONS-SCNC: 11 MMOL/L (ref 5–15)
APTT PPP: 46.8 SECONDS (ref 61–76.5)
APTT PPP: 66.8 SECONDS (ref 61–76.5)
APTT PPP: >139 SECONDS (ref 61–76.5)
BH CV ECHO MEAS - % IVS THICK: 13.4 %
BH CV ECHO MEAS - % LVPW THICK: 52.4 %
BH CV ECHO MEAS - ACS: 2 CM
BH CV ECHO MEAS - AO MAX PG (FULL): 0.54 MMHG
BH CV ECHO MEAS - AO MAX PG: 4.2 MMHG
BH CV ECHO MEAS - AO MEAN PG (FULL): 0.65 MMHG
BH CV ECHO MEAS - AO MEAN PG: 2.6 MMHG
BH CV ECHO MEAS - AO ROOT AREA (BSA CORRECTED): 1.1
BH CV ECHO MEAS - AO ROOT AREA: 4.8 CM^2
BH CV ECHO MEAS - AO ROOT DIAM: 2.5 CM
BH CV ECHO MEAS - AO V2 MAX: 102.5 CM/SEC
BH CV ECHO MEAS - AO V2 MEAN: 78.6 CM/SEC
BH CV ECHO MEAS - AO V2 VTI: 17.1 CM
BH CV ECHO MEAS - AVA(I,A): 2.8 CM^2
BH CV ECHO MEAS - AVA(I,D): 2.8 CM^2
BH CV ECHO MEAS - AVA(V,A): 2.9 CM^2
BH CV ECHO MEAS - AVA(V,D): 2.9 CM^2
BH CV ECHO MEAS - BSA(HAYCOCK): 2.4 M^2
BH CV ECHO MEAS - BSA: 2.2 M^2
BH CV ECHO MEAS - BZI_BMI: 48.7 KILOGRAMS/M^2
BH CV ECHO MEAS - BZI_METRIC_HEIGHT: 160 CM
BH CV ECHO MEAS - BZI_METRIC_WEIGHT: 124.7 KG
BH CV ECHO MEAS - EDV(CUBED): 123.2 ML
BH CV ECHO MEAS - EDV(MOD-SP4): 52.2 ML
BH CV ECHO MEAS - EDV(TEICH): 116.9 ML
BH CV ECHO MEAS - EF(CUBED): 49 %
BH CV ECHO MEAS - EF(MOD-BP): 41 %
BH CV ECHO MEAS - EF(MOD-SP4): 41.2 %
BH CV ECHO MEAS - EF(TEICH): 41 %
BH CV ECHO MEAS - ESV(CUBED): 62.8 ML
BH CV ECHO MEAS - ESV(MOD-SP4): 30.7 ML
BH CV ECHO MEAS - ESV(TEICH): 69 ML
BH CV ECHO MEAS - FS: 20.1 %
BH CV ECHO MEAS - IVS/LVPW: 1.3
BH CV ECHO MEAS - IVSD: 1 CM
BH CV ECHO MEAS - IVSS: 1.2 CM
BH CV ECHO MEAS - LA DIMENSION(2D): 4.6 CM
BH CV ECHO MEAS - LV DIASTOLIC VOL/BSA (35-75): 23.6 ML/M^2
BH CV ECHO MEAS - LV MASS(C)D: 163.5 GRAMS
BH CV ECHO MEAS - LV MASS(C)DI: 73.9 GRAMS/M^2
BH CV ECHO MEAS - LV MASS(C)S: 166.7 GRAMS
BH CV ECHO MEAS - LV MASS(C)SI: 75.3 GRAMS/M^2
BH CV ECHO MEAS - LV MAX PG: 3.7 MMHG
BH CV ECHO MEAS - LV MEAN PG: 2 MMHG
BH CV ECHO MEAS - LV SYSTOLIC VOL/BSA (12-30): 13.9 ML/M^2
BH CV ECHO MEAS - LV V1 MAX: 95.8 CM/SEC
BH CV ECHO MEAS - LV V1 MEAN: 64.8 CM/SEC
BH CV ECHO MEAS - LV V1 VTI: 15.5 CM
BH CV ECHO MEAS - LVIDD: 5 CM
BH CV ECHO MEAS - LVIDS: 4 CM
BH CV ECHO MEAS - LVOT AREA: 3.1 CM^2
BH CV ECHO MEAS - LVOT DIAM: 2 CM
BH CV ECHO MEAS - LVPWD: 0.82 CM
BH CV ECHO MEAS - LVPWS: 1.3 CM
BH CV ECHO MEAS - MR MAX PG: 92.6 MMHG
BH CV ECHO MEAS - MR MAX VEL: 481.2 CM/SEC
BH CV ECHO MEAS - MV A MAX VEL: 116.9 CM/SEC
BH CV ECHO MEAS - MV DEC SLOPE: 273.9 CM/SEC^2
BH CV ECHO MEAS - MV DEC TIME: 0.23 SEC
BH CV ECHO MEAS - MV E MAX VEL: 33.3 CM/SEC
BH CV ECHO MEAS - MV E/A: 0.28
BH CV ECHO MEAS - MV MAX PG: 6.1 MMHG
BH CV ECHO MEAS - MV MEAN PG: 2.3 MMHG
BH CV ECHO MEAS - MV V2 MAX: 123.6 CM/SEC
BH CV ECHO MEAS - MV V2 MEAN: 70.4 CM/SEC
BH CV ECHO MEAS - MV V2 VTI: 24.1 CM
BH CV ECHO MEAS - MVA(VTI): 2 CM^2
BH CV ECHO MEAS - PA ACC TIME: 0.07 SEC
BH CV ECHO MEAS - PA MAX PG (FULL): 1.2 MMHG
BH CV ECHO MEAS - PA MAX PG: 3.9 MMHG
BH CV ECHO MEAS - PA MEAN PG (FULL): 0.72 MMHG
BH CV ECHO MEAS - PA MEAN PG: 1.9 MMHG
BH CV ECHO MEAS - PA PR(ACCEL): 49.4 MMHG
BH CV ECHO MEAS - PA V2 MAX: 98.1 CM/SEC
BH CV ECHO MEAS - PA V2 MEAN: 65.2 CM/SEC
BH CV ECHO MEAS - PA V2 VTI: 17.4 CM
BH CV ECHO MEAS - PI END-D VEL: 146.2 CM/SEC
BH CV ECHO MEAS - PI MAX PG: 20.5 MMHG
BH CV ECHO MEAS - PI MAX VEL: 226.2 CM/SEC
BH CV ECHO MEAS - RAP SYSTOLE: 8 MMHG
BH CV ECHO MEAS - RV MAX PG: 2.6 MMHG
BH CV ECHO MEAS - RV MEAN PG: 1.1 MMHG
BH CV ECHO MEAS - RV V1 MAX: 80.8 CM/SEC
BH CV ECHO MEAS - RV V1 MEAN: 49.7 CM/SEC
BH CV ECHO MEAS - RV V1 VTI: 12.7 CM
BH CV ECHO MEAS - RVDD: 3.2 CM
BH CV ECHO MEAS - RVSP: 51.9 MMHG
BH CV ECHO MEAS - SI(AO): 37.2 ML/M^2
BH CV ECHO MEAS - SI(CUBED): 27.3 ML/M^2
BH CV ECHO MEAS - SI(LVOT): 21.8 ML/M^2
BH CV ECHO MEAS - SI(MOD-SP4): 9.7 ML/M^2
BH CV ECHO MEAS - SI(TEICH): 21.7 ML/M^2
BH CV ECHO MEAS - SV(AO): 82.5 ML
BH CV ECHO MEAS - SV(CUBED): 60.4 ML
BH CV ECHO MEAS - SV(LVOT): 48.2 ML
BH CV ECHO MEAS - SV(MOD-SP4): 21.5 ML
BH CV ECHO MEAS - SV(TEICH): 48 ML
BH CV ECHO MEAS - TR MAX VEL: 331.2 CM/SEC
BUN SERPL-MCNC: 40 MG/DL (ref 8–23)
BUN/CREAT SERPL: 30.5 (ref 7–25)
CALCIUM SPEC-SCNC: 9.2 MG/DL (ref 8.6–10.5)
CHLORIDE SERPL-SCNC: 105 MMOL/L (ref 98–107)
CO2 SERPL-SCNC: 23 MMOL/L (ref 22–29)
CREAT SERPL-MCNC: 1.31 MG/DL (ref 0.57–1)
DEPRECATED RDW RBC AUTO: 51.6 FL (ref 37–54)
ERYTHROCYTE [DISTWIDTH] IN BLOOD BY AUTOMATED COUNT: 16.4 % (ref 12.3–15.4)
GFR SERPL CREATININE-BSD FRML MDRD: 49 ML/MIN/1.73
GLUCOSE BLDC GLUCOMTR-MCNC: 106 MG/DL (ref 70–105)
GLUCOSE BLDC GLUCOMTR-MCNC: 117 MG/DL (ref 70–105)
GLUCOSE BLDC GLUCOMTR-MCNC: 127 MG/DL (ref 70–105)
GLUCOSE BLDC GLUCOMTR-MCNC: 136 MG/DL (ref 70–105)
GLUCOSE SERPL-MCNC: 97 MG/DL (ref 65–99)
HCT VFR BLD AUTO: 36 % (ref 34–46.6)
HGB BLD-MCNC: 11.9 G/DL (ref 12–15.9)
LV EF 2D ECHO EST: 40 %
MAGNESIUM SERPL-MCNC: 2.1 MG/DL (ref 1.6–2.4)
MAXIMAL PREDICTED HEART RATE: 151 BPM
MCH RBC QN AUTO: 29.5 PG (ref 26.6–33)
MCHC RBC AUTO-ENTMCNC: 33.1 G/DL (ref 31.5–35.7)
MCV RBC AUTO: 89.2 FL (ref 79–97)
PHOSPHATE SERPL-MCNC: 3.1 MG/DL (ref 2.5–4.5)
PLATELET # BLD AUTO: 221 10*3/MM3 (ref 140–450)
PMV BLD AUTO: 10.1 FL (ref 6–12)
POTASSIUM SERPL-SCNC: 3.7 MMOL/L (ref 3.5–5.2)
RBC # BLD AUTO: 4.04 10*6/MM3 (ref 3.77–5.28)
SODIUM SERPL-SCNC: 139 MMOL/L (ref 136–145)
STRESS TARGET HR: 128 BPM
WBC # BLD AUTO: 9 10*3/MM3 (ref 3.4–10.8)

## 2021-02-26 PROCEDURE — 25010000002 HEPARIN (PORCINE) 25000-0.45 UT/250ML-% SOLUTION: Performed by: STUDENT IN AN ORGANIZED HEALTH CARE EDUCATION/TRAINING PROGRAM

## 2021-02-26 PROCEDURE — 80069 RENAL FUNCTION PANEL: CPT | Performed by: INTERNAL MEDICINE

## 2021-02-26 PROCEDURE — 94760 N-INVAS EAR/PLS OXIMETRY 1: CPT

## 2021-02-26 PROCEDURE — 99233 SBSQ HOSP IP/OBS HIGH 50: CPT | Performed by: INTERNAL MEDICINE

## 2021-02-26 PROCEDURE — 99231 SBSQ HOSP IP/OBS SF/LOW 25: CPT | Performed by: NURSE PRACTITIONER

## 2021-02-26 PROCEDURE — 85027 COMPLETE CBC AUTOMATED: CPT | Performed by: INTERNAL MEDICINE

## 2021-02-26 PROCEDURE — 93306 TTE W/DOPPLER COMPLETE: CPT | Performed by: INTERNAL MEDICINE

## 2021-02-26 PROCEDURE — 85730 THROMBOPLASTIN TIME PARTIAL: CPT | Performed by: STUDENT IN AN ORGANIZED HEALTH CARE EDUCATION/TRAINING PROGRAM

## 2021-02-26 PROCEDURE — 83735 ASSAY OF MAGNESIUM: CPT | Performed by: INTERNAL MEDICINE

## 2021-02-26 PROCEDURE — 85730 THROMBOPLASTIN TIME PARTIAL: CPT | Performed by: INTERNAL MEDICINE

## 2021-02-26 PROCEDURE — 94799 UNLISTED PULMONARY SVC/PX: CPT

## 2021-02-26 PROCEDURE — 99232 SBSQ HOSP IP/OBS MODERATE 35: CPT | Performed by: INTERNAL MEDICINE

## 2021-02-26 PROCEDURE — 82962 GLUCOSE BLOOD TEST: CPT

## 2021-02-26 PROCEDURE — 93306 TTE W/DOPPLER COMPLETE: CPT

## 2021-02-26 RX ADMIN — SPIRONOLACTONE 25 MG: 25 TABLET ORAL at 08:07

## 2021-02-26 RX ADMIN — HEPARIN SODIUM 10.9 UNITS/KG/HR: 10000 INJECTION, SOLUTION INTRAVENOUS at 12:11

## 2021-02-26 RX ADMIN — Medication 10 ML: at 21:10

## 2021-02-26 RX ADMIN — BUDESONIDE AND FORMOTEROL FUMARATE DIHYDRATE 2 PUFF: 80; 4.5 AEROSOL RESPIRATORY (INHALATION) at 06:53

## 2021-02-26 RX ADMIN — METOPROLOL SUCCINATE 25 MG: 25 TABLET, EXTENDED RELEASE ORAL at 08:07

## 2021-02-26 RX ADMIN — ASPIRIN 325 MG: 325 TABLET, COATED ORAL at 08:07

## 2021-02-26 RX ADMIN — ALLOPURINOL 100 MG: 100 TABLET ORAL at 08:07

## 2021-02-26 RX ADMIN — GUAIFENESIN 600 MG: 600 TABLET, EXTENDED RELEASE ORAL at 08:06

## 2021-02-26 RX ADMIN — BUDESONIDE AND FORMOTEROL FUMARATE DIHYDRATE 2 PUFF: 80; 4.5 AEROSOL RESPIRATORY (INHALATION) at 18:25

## 2021-02-26 RX ADMIN — GUAIFENESIN 600 MG: 600 TABLET, EXTENDED RELEASE ORAL at 21:09

## 2021-02-26 RX ADMIN — TORSEMIDE 20 MG: 10 TABLET ORAL at 08:06

## 2021-02-26 RX ADMIN — HEPARIN SODIUM 11.9 UNITS/KG/HR: 10000 INJECTION, SOLUTION INTRAVENOUS at 00:53

## 2021-02-26 RX ADMIN — Medication 10 ML: at 08:07

## 2021-02-27 LAB
ALBUMIN SERPL-MCNC: 3.4 G/DL (ref 3.5–5.2)
ANION GAP SERPL CALCULATED.3IONS-SCNC: 11 MMOL/L (ref 5–15)
APTT PPP: 55.3 SECONDS (ref 61–76.5)
APTT PPP: 58.8 SECONDS (ref 61–76.5)
APTT PPP: 91.7 SECONDS (ref 61–76.5)
BASOPHILS # BLD AUTO: 0.2 10*3/MM3 (ref 0–0.2)
BASOPHILS NFR BLD AUTO: 1.7 % (ref 0–1.5)
BUN SERPL-MCNC: 27 MG/DL (ref 8–23)
BUN/CREAT SERPL: 23.9 (ref 7–25)
CALCIUM SPEC-SCNC: 9.1 MG/DL (ref 8.6–10.5)
CHLORIDE SERPL-SCNC: 103 MMOL/L (ref 98–107)
CO2 SERPL-SCNC: 22 MMOL/L (ref 22–29)
CREAT SERPL-MCNC: 1.13 MG/DL (ref 0.57–1)
DEPRECATED RDW RBC AUTO: 50.8 FL (ref 37–54)
EOSINOPHIL # BLD AUTO: 0 10*3/MM3 (ref 0–0.4)
EOSINOPHIL NFR BLD AUTO: 0 % (ref 0.3–6.2)
ERYTHROCYTE [DISTWIDTH] IN BLOOD BY AUTOMATED COUNT: 16.5 % (ref 12.3–15.4)
GFR SERPL CREATININE-BSD FRML MDRD: 58 ML/MIN/1.73
GLUCOSE BLDC GLUCOMTR-MCNC: 110 MG/DL (ref 70–105)
GLUCOSE BLDC GLUCOMTR-MCNC: 110 MG/DL (ref 70–105)
GLUCOSE BLDC GLUCOMTR-MCNC: 134 MG/DL (ref 70–105)
GLUCOSE BLDC GLUCOMTR-MCNC: 157 MG/DL (ref 70–105)
GLUCOSE SERPL-MCNC: 90 MG/DL (ref 65–99)
HCT VFR BLD AUTO: 35.3 % (ref 34–46.6)
HGB BLD-MCNC: 11.8 G/DL (ref 12–15.9)
LYMPHOCYTES # BLD AUTO: 2.4 10*3/MM3 (ref 0.7–3.1)
LYMPHOCYTES NFR BLD AUTO: 24.4 % (ref 19.6–45.3)
MAGNESIUM SERPL-MCNC: 2 MG/DL (ref 1.6–2.4)
MCH RBC QN AUTO: 29.1 PG (ref 26.6–33)
MCHC RBC AUTO-ENTMCNC: 33.4 G/DL (ref 31.5–35.7)
MCV RBC AUTO: 87.1 FL (ref 79–97)
MONOCYTES # BLD AUTO: 1 10*3/MM3 (ref 0.1–0.9)
MONOCYTES NFR BLD AUTO: 10.2 % (ref 5–12)
NEUTROPHILS NFR BLD AUTO: 6.1 10*3/MM3 (ref 1.7–7)
NEUTROPHILS NFR BLD AUTO: 63.7 % (ref 42.7–76)
NRBC BLD AUTO-RTO: 0.1 /100 WBC (ref 0–0.2)
PHOSPHATE SERPL-MCNC: 2.7 MG/DL (ref 2.5–4.5)
PLATELET # BLD AUTO: 196 10*3/MM3 (ref 140–450)
PMV BLD AUTO: 10.1 FL (ref 6–12)
POTASSIUM SERPL-SCNC: 3.6 MMOL/L (ref 3.5–5.2)
RBC # BLD AUTO: 4.05 10*6/MM3 (ref 3.77–5.28)
SODIUM SERPL-SCNC: 136 MMOL/L (ref 136–145)
WBC # BLD AUTO: 9.7 10*3/MM3 (ref 3.4–10.8)

## 2021-02-27 PROCEDURE — 85730 THROMBOPLASTIN TIME PARTIAL: CPT | Performed by: INTERNAL MEDICINE

## 2021-02-27 PROCEDURE — 82962 GLUCOSE BLOOD TEST: CPT

## 2021-02-27 PROCEDURE — 80069 RENAL FUNCTION PANEL: CPT | Performed by: INTERNAL MEDICINE

## 2021-02-27 PROCEDURE — 99232 SBSQ HOSP IP/OBS MODERATE 35: CPT | Performed by: INTERNAL MEDICINE

## 2021-02-27 PROCEDURE — 94640 AIRWAY INHALATION TREATMENT: CPT

## 2021-02-27 PROCEDURE — 94799 UNLISTED PULMONARY SVC/PX: CPT

## 2021-02-27 PROCEDURE — 85025 COMPLETE CBC W/AUTO DIFF WBC: CPT | Performed by: STUDENT IN AN ORGANIZED HEALTH CARE EDUCATION/TRAINING PROGRAM

## 2021-02-27 PROCEDURE — 99231 SBSQ HOSP IP/OBS SF/LOW 25: CPT | Performed by: NURSE PRACTITIONER

## 2021-02-27 PROCEDURE — 25010000002 HEPARIN (PORCINE) 25000-0.45 UT/250ML-% SOLUTION: Performed by: STUDENT IN AN ORGANIZED HEALTH CARE EDUCATION/TRAINING PROGRAM

## 2021-02-27 PROCEDURE — 83735 ASSAY OF MAGNESIUM: CPT | Performed by: INTERNAL MEDICINE

## 2021-02-27 PROCEDURE — 99233 SBSQ HOSP IP/OBS HIGH 50: CPT | Performed by: INTERNAL MEDICINE

## 2021-02-27 RX ORDER — POLYETHYLENE GLYCOL 3350 17 G/17G
17 POWDER, FOR SOLUTION ORAL DAILY
Status: DISCONTINUED | OUTPATIENT
Start: 2021-02-27 | End: 2021-03-02 | Stop reason: HOSPADM

## 2021-02-27 RX ADMIN — Medication 10 ML: at 08:29

## 2021-02-27 RX ADMIN — GUAIFENESIN 600 MG: 600 TABLET, EXTENDED RELEASE ORAL at 20:52

## 2021-02-27 RX ADMIN — BUDESONIDE AND FORMOTEROL FUMARATE DIHYDRATE 2 PUFF: 80; 4.5 AEROSOL RESPIRATORY (INHALATION) at 18:09

## 2021-02-27 RX ADMIN — POLYETHYLENE GLYCOL 3350 17 G: 17 POWDER, FOR SOLUTION ORAL at 13:29

## 2021-02-27 RX ADMIN — HEPARIN SODIUM 11.9 UNITS/KG/HR: 10000 INJECTION, SOLUTION INTRAVENOUS at 17:18

## 2021-02-27 RX ADMIN — TORSEMIDE 20 MG: 10 TABLET ORAL at 08:28

## 2021-02-27 RX ADMIN — BUDESONIDE AND FORMOTEROL FUMARATE DIHYDRATE 2 PUFF: 80; 4.5 AEROSOL RESPIRATORY (INHALATION) at 06:35

## 2021-02-27 RX ADMIN — ASPIRIN 325 MG: 325 TABLET, COATED ORAL at 08:28

## 2021-02-27 RX ADMIN — HEPARIN SODIUM 12.9 UNITS/KG/HR: 10000 INJECTION, SOLUTION INTRAVENOUS at 18:54

## 2021-02-27 RX ADMIN — ALLOPURINOL 100 MG: 100 TABLET ORAL at 08:28

## 2021-02-27 RX ADMIN — HEPARIN SODIUM 12.9 UNITS/KG/HR: 10000 INJECTION, SOLUTION INTRAVENOUS at 03:58

## 2021-02-27 RX ADMIN — SPIRONOLACTONE 25 MG: 25 TABLET ORAL at 08:29

## 2021-02-27 RX ADMIN — GUAIFENESIN 600 MG: 600 TABLET, EXTENDED RELEASE ORAL at 08:28

## 2021-02-27 RX ADMIN — METOPROLOL SUCCINATE 25 MG: 25 TABLET, EXTENDED RELEASE ORAL at 08:28

## 2021-02-28 LAB
ANION GAP SERPL CALCULATED.3IONS-SCNC: 13 MMOL/L (ref 5–15)
APTT PPP: 55.5 SECONDS (ref 61–76.5)
APTT PPP: 73.1 SECONDS (ref 61–76.5)
APTT PPP: 83.7 SECONDS (ref 61–76.5)
BUN SERPL-MCNC: 19 MG/DL (ref 8–23)
BUN/CREAT SERPL: 16.7 (ref 7–25)
CALCIUM SPEC-SCNC: 9.8 MG/DL (ref 8.6–10.5)
CHLORIDE SERPL-SCNC: 104 MMOL/L (ref 98–107)
CO2 SERPL-SCNC: 21 MMOL/L (ref 22–29)
CREAT SERPL-MCNC: 1.14 MG/DL (ref 0.57–1)
GFR SERPL CREATININE-BSD FRML MDRD: 57 ML/MIN/1.73
GLUCOSE BLDC GLUCOMTR-MCNC: 100 MG/DL (ref 70–105)
GLUCOSE BLDC GLUCOMTR-MCNC: 117 MG/DL (ref 70–105)
GLUCOSE BLDC GLUCOMTR-MCNC: 134 MG/DL (ref 70–105)
GLUCOSE BLDC GLUCOMTR-MCNC: 140 MG/DL (ref 70–105)
GLUCOSE SERPL-MCNC: 96 MG/DL (ref 65–99)
MAGNESIUM SERPL-MCNC: 2 MG/DL (ref 1.6–2.4)
POTASSIUM SERPL-SCNC: 4.1 MMOL/L (ref 3.5–5.2)
SODIUM SERPL-SCNC: 138 MMOL/L (ref 136–145)

## 2021-02-28 PROCEDURE — 85730 THROMBOPLASTIN TIME PARTIAL: CPT | Performed by: INTERNAL MEDICINE

## 2021-02-28 PROCEDURE — 94799 UNLISTED PULMONARY SVC/PX: CPT

## 2021-02-28 PROCEDURE — 80048 BASIC METABOLIC PNL TOTAL CA: CPT | Performed by: INTERNAL MEDICINE

## 2021-02-28 PROCEDURE — 83735 ASSAY OF MAGNESIUM: CPT | Performed by: INTERNAL MEDICINE

## 2021-02-28 PROCEDURE — 82962 GLUCOSE BLOOD TEST: CPT

## 2021-02-28 PROCEDURE — 25010000002 HEPARIN (PORCINE) 25000-0.45 UT/250ML-% SOLUTION: Performed by: STUDENT IN AN ORGANIZED HEALTH CARE EDUCATION/TRAINING PROGRAM

## 2021-02-28 PROCEDURE — 99233 SBSQ HOSP IP/OBS HIGH 50: CPT | Performed by: INTERNAL MEDICINE

## 2021-02-28 PROCEDURE — 99232 SBSQ HOSP IP/OBS MODERATE 35: CPT | Performed by: INTERNAL MEDICINE

## 2021-02-28 PROCEDURE — 94760 N-INVAS EAR/PLS OXIMETRY 1: CPT

## 2021-02-28 RX ADMIN — ASPIRIN 325 MG: 325 TABLET, COATED ORAL at 08:07

## 2021-02-28 RX ADMIN — HEPARIN SODIUM 11.35 UNITS/KG/HR: 10000 INJECTION, SOLUTION INTRAVENOUS at 05:53

## 2021-02-28 RX ADMIN — BUDESONIDE AND FORMOTEROL FUMARATE DIHYDRATE 2 PUFF: 80; 4.5 AEROSOL RESPIRATORY (INHALATION) at 18:30

## 2021-02-28 RX ADMIN — METOPROLOL SUCCINATE 25 MG: 25 TABLET, EXTENDED RELEASE ORAL at 08:07

## 2021-02-28 RX ADMIN — HEPARIN SODIUM 13.35 UNITS/KG/HR: 10000 INJECTION, SOLUTION INTRAVENOUS at 17:54

## 2021-02-28 RX ADMIN — GUAIFENESIN 600 MG: 600 TABLET, EXTENDED RELEASE ORAL at 08:07

## 2021-02-28 RX ADMIN — BUDESONIDE AND FORMOTEROL FUMARATE DIHYDRATE 2 PUFF: 80; 4.5 AEROSOL RESPIRATORY (INHALATION) at 06:30

## 2021-02-28 RX ADMIN — ALLOPURINOL 100 MG: 100 TABLET ORAL at 08:07

## 2021-02-28 RX ADMIN — TORSEMIDE 20 MG: 10 TABLET ORAL at 08:07

## 2021-02-28 RX ADMIN — POLYETHYLENE GLYCOL 3350 17 G: 17 POWDER, FOR SOLUTION ORAL at 08:08

## 2021-02-28 RX ADMIN — SPIRONOLACTONE 25 MG: 25 TABLET ORAL at 08:07

## 2021-02-28 RX ADMIN — Medication 10 ML: at 08:08

## 2021-03-01 LAB
ANION GAP SERPL CALCULATED.3IONS-SCNC: 11 MMOL/L (ref 5–15)
APTT PPP: 103.6 SECONDS (ref 61–76.5)
APTT PPP: 56.7 SECONDS (ref 61–76.5)
APTT PPP: 65.4 SECONDS (ref 61–76.5)
APTT PPP: 91.4 SECONDS (ref 61–76.5)
BASOPHILS # BLD AUTO: 0 10*3/MM3 (ref 0–0.2)
BASOPHILS NFR BLD AUTO: 0.6 % (ref 0–1.5)
BUN SERPL-MCNC: 14 MG/DL (ref 8–23)
BUN/CREAT SERPL: 13 (ref 7–25)
CALCIUM SPEC-SCNC: 9.4 MG/DL (ref 8.6–10.5)
CHLORIDE SERPL-SCNC: 103 MMOL/L (ref 98–107)
CO2 SERPL-SCNC: 24 MMOL/L (ref 22–29)
CREAT SERPL-MCNC: 1.08 MG/DL (ref 0.57–1)
DEPRECATED RDW RBC AUTO: 54.7 FL (ref 37–54)
EOSINOPHIL # BLD AUTO: 0 10*3/MM3 (ref 0–0.4)
EOSINOPHIL NFR BLD AUTO: 0 % (ref 0.3–6.2)
ERYTHROCYTE [DISTWIDTH] IN BLOOD BY AUTOMATED COUNT: 17.2 % (ref 12.3–15.4)
GFR SERPL CREATININE-BSD FRML MDRD: 61 ML/MIN/1.73
GLUCOSE BLDC GLUCOMTR-MCNC: 112 MG/DL (ref 70–105)
GLUCOSE BLDC GLUCOMTR-MCNC: 133 MG/DL (ref 70–105)
GLUCOSE BLDC GLUCOMTR-MCNC: 134 MG/DL (ref 70–105)
GLUCOSE BLDC GLUCOMTR-MCNC: 150 MG/DL (ref 70–105)
GLUCOSE SERPL-MCNC: 114 MG/DL (ref 65–99)
HCT VFR BLD AUTO: 35.7 % (ref 34–46.6)
HGB BLD-MCNC: 11.9 G/DL (ref 12–15.9)
INR PPP: 1.03 (ref 2–3)
LYMPHOCYTES # BLD AUTO: 2.2 10*3/MM3 (ref 0.7–3.1)
LYMPHOCYTES NFR BLD AUTO: 27.2 % (ref 19.6–45.3)
MAGNESIUM SERPL-MCNC: 1.9 MG/DL (ref 1.6–2.4)
MCH RBC QN AUTO: 29.6 PG (ref 26.6–33)
MCHC RBC AUTO-ENTMCNC: 33.3 G/DL (ref 31.5–35.7)
MCV RBC AUTO: 88.9 FL (ref 79–97)
MONOCYTES # BLD AUTO: 0.7 10*3/MM3 (ref 0.1–0.9)
MONOCYTES NFR BLD AUTO: 8.5 % (ref 5–12)
NEUTROPHILS NFR BLD AUTO: 5.1 10*3/MM3 (ref 1.7–7)
NEUTROPHILS NFR BLD AUTO: 63.7 % (ref 42.7–76)
NRBC BLD AUTO-RTO: 0.1 /100 WBC (ref 0–0.2)
PLATELET # BLD AUTO: 213 10*3/MM3 (ref 140–450)
PMV BLD AUTO: 9.3 FL (ref 6–12)
POTASSIUM SERPL-SCNC: 4 MMOL/L (ref 3.5–5.2)
PROTHROMBIN TIME: 11.3 SECONDS (ref 19.4–28.5)
RBC # BLD AUTO: 4.02 10*6/MM3 (ref 3.77–5.28)
SODIUM SERPL-SCNC: 138 MMOL/L (ref 136–145)
WBC # BLD AUTO: 8.1 10*3/MM3 (ref 3.4–10.8)

## 2021-03-01 PROCEDURE — 85025 COMPLETE CBC W/AUTO DIFF WBC: CPT | Performed by: STUDENT IN AN ORGANIZED HEALTH CARE EDUCATION/TRAINING PROGRAM

## 2021-03-01 PROCEDURE — 94799 UNLISTED PULMONARY SVC/PX: CPT

## 2021-03-01 PROCEDURE — 85610 PROTHROMBIN TIME: CPT | Performed by: HOSPITALIST

## 2021-03-01 PROCEDURE — 85730 THROMBOPLASTIN TIME PARTIAL: CPT | Performed by: INTERNAL MEDICINE

## 2021-03-01 PROCEDURE — 80048 BASIC METABOLIC PNL TOTAL CA: CPT | Performed by: INTERNAL MEDICINE

## 2021-03-01 PROCEDURE — 83735 ASSAY OF MAGNESIUM: CPT | Performed by: INTERNAL MEDICINE

## 2021-03-01 PROCEDURE — 82962 GLUCOSE BLOOD TEST: CPT

## 2021-03-01 PROCEDURE — 99231 SBSQ HOSP IP/OBS SF/LOW 25: CPT | Performed by: HOSPITALIST

## 2021-03-01 PROCEDURE — 99232 SBSQ HOSP IP/OBS MODERATE 35: CPT | Performed by: INTERNAL MEDICINE

## 2021-03-01 PROCEDURE — 25010000002 HEPARIN (PORCINE) 25000-0.45 UT/250ML-% SOLUTION: Performed by: STUDENT IN AN ORGANIZED HEALTH CARE EDUCATION/TRAINING PROGRAM

## 2021-03-01 RX ORDER — WARFARIN SODIUM 5 MG/1
5 TABLET ORAL
Status: COMPLETED | OUTPATIENT
Start: 2021-03-01 | End: 2021-03-01

## 2021-03-01 RX ADMIN — ALLOPURINOL 100 MG: 100 TABLET ORAL at 08:18

## 2021-03-01 RX ADMIN — BUDESONIDE AND FORMOTEROL FUMARATE DIHYDRATE 2 PUFF: 80; 4.5 AEROSOL RESPIRATORY (INHALATION) at 18:15

## 2021-03-01 RX ADMIN — WARFARIN 5 MG: 5 TABLET ORAL at 19:02

## 2021-03-01 RX ADMIN — HEPARIN SODIUM 12.7 UNITS/KG/HR: 10000 INJECTION, SOLUTION INTRAVENOUS at 11:06

## 2021-03-01 RX ADMIN — METOPROLOL SUCCINATE 25 MG: 25 TABLET, EXTENDED RELEASE ORAL at 08:18

## 2021-03-01 RX ADMIN — ASPIRIN 325 MG: 325 TABLET, COATED ORAL at 08:18

## 2021-03-01 RX ADMIN — BUDESONIDE AND FORMOTEROL FUMARATE DIHYDRATE 2 PUFF: 80; 4.5 AEROSOL RESPIRATORY (INHALATION) at 06:46

## 2021-03-01 RX ADMIN — TORSEMIDE 20 MG: 10 TABLET ORAL at 08:18

## 2021-03-01 RX ADMIN — Medication 10 ML: at 21:01

## 2021-03-01 RX ADMIN — Medication 10 ML: at 08:18

## 2021-03-01 RX ADMIN — SPIRONOLACTONE 25 MG: 25 TABLET ORAL at 08:18

## 2021-03-01 NOTE — PROGRESS NOTES
"      [facility] Medicine Services Daily Progress Note      Hospitalist Team  LOS 10 days      Patient Care Team:  Latanya Hawk MD as PCP - General (Family Medicine)  Kit Jacques MD as Consulting Physician (Cardiology)    Patient Location: 2105/1      Subjective   Subjective     Chief Complaint / Subjective  Chief Complaint   Patient presents with   • Shortness of Breath     Shortness of air started 3 days ago. sent in by dr. Hawk for evaluation         Brief Synopsis of Hospital Course/HPI    Date::    3/1/21: Denies shortness of breath.      Review of Systems   All other systems reviewed and are negative.        Objective   Objective      Vital Signs  Temp:  [97.2 °F (36.2 °C)-98.1 °F (36.7 °C)] 97.2 °F (36.2 °C)  Heart Rate:  [] 87  Resp:  [12-29] 20  BP: (102-129)/(56-90) 102/56  Oxygen Therapy  SpO2: 96 %  Pulse Oximetry Type: Continuous  Device (Oxygen Therapy): mask  Device (Oxygen Therapy): room air  Flow (L/min): 3  Flowsheet Rows      First Filed Value   Admission Height  160 cm (63\") Documented at 02/19/2021 1154   Admission Weight  121 kg (266 lb) Documented at 02/19/2021 1154        Intake & Output (last 3 days)       02/26 0701 - 02/27 0700 02/27 0701 - 02/28 0700 02/28 0701 - 03/01 0700 03/01 0701 - 03/02 0700    P.O.  240    Total Intake(mL/kg) 340 (2.7) 580 (4.6) 1260 (9.8) 240 (1.9)    Urine (mL/kg/hr) 1850 (0.6) 800 (0.3) 1200 (0.4)     Total Output 9499 231 0226     Net -1510 -220 +60 +240            Urine Unmeasured Occurrence  1 x      Stool Unmeasured Occurrence  1 x          Lines, Drains & Airways    Active LDAs     Name:   Placement date:   Placement time:   Site:   Days:    Peripheral IV 02/19/21 1225 Left Forearm   02/19/21    1225    Forearm   10                  Physical Exam:    Physical Exam  HENT:      Head: Normocephalic.      Nose: Nose normal.   Eyes:      General: No scleral icterus.     Extraocular " Movements: Extraocular movements intact.      Pupils: Pupils are equal, round, and reactive to light.   Neck:      Musculoskeletal: Normal range of motion.   Cardiovascular:      Rate and Rhythm: Normal rate and regular rhythm.   Pulmonary:      Effort: Pulmonary effort is normal.      Breath sounds: Normal breath sounds.   Abdominal:      General: Bowel sounds are normal.      Palpations: Abdomen is soft.   Musculoskeletal: Normal range of motion.   Skin:     General: Skin is warm.   Neurological:      Mental Status: She is alert and oriented to person, place, and time. Mental status is at baseline.   Psychiatric:         Mood and Affect: Mood normal.               Procedures:    Procedure(s):  Cardiac Catheterization/Vascular Study          Results Review:     I reviewed the patient's new clinical results.      Lab Results (last 24 hours)     Procedure Component Value Units Date/Time    POC Glucose Once [[id number]]  (Abnormal) Collected: 03/01/21 1209    Specimen: Blood Updated: 03/01/21 1226     Glucose 133 mg/dL      Comment: Serial Number: [id number]:  [id number]       aPTT [[id number]]  (Abnormal) Collected: 03/01/21 1033    Specimen: Blood Updated: 03/01/21 1056     PTT 56.7 seconds     POC Glucose Once [[id number]]  (Abnormal) Collected: 03/01/21 0717    Specimen: Blood Updated: 03/01/21 0720     Glucose 134 mg/dL      Comment: Serial Number: [id number]:  [id number]       Magnesium [[id number]]  (Normal) Collected: 03/01/21 0259    Specimen: Blood Updated: 03/01/21 0345     Magnesium 1.9 mg/dL     Basic Metabolic Panel [[id number]]  (Abnormal) Collected: 03/01/21 0259    Specimen: Blood Updated: 03/01/21 0345     Glucose 114 mg/dL      BUN 14 mg/dL      Creatinine 1.08 mg/dL      Sodium 138 mmol/L      Potassium 4.0 mmol/L      Chloride 103 mmol/L      CO2 24.0 mmol/L      Calcium 9.4 mg/dL      eGFR   Amer 61 mL/min/1.73      BUN/Creatinine Ratio 13.0     Anion Gap 11.0 mmol/L     Narrative:       GFR Normal >60  Chronic Kidney Disease <60  Kidney Failure <15      aPTT [[id number]]  (Normal) Collected: 03/01/21 0259    Specimen: Blood from Arm, Right Updated: 03/01/21 0332     PTT 65.4 seconds     CBC & Differential [[id number]]  (Abnormal) Collected: 03/01/21 0259    Specimen: Blood Updated: 03/01/21 0316    Narrative:      The following orders were created for panel order CBC & Differential.  Procedure                               Abnormality         Status                     ---------                               -----------         ------                     CBC Auto Differential[[id number]]        Abnormal            Final result                 Please view results for these tests on the individual orders.    CBC Auto Differential [[id number]]  (Abnormal) Collected: 03/01/21 0259    Specimen: Blood Updated: 03/01/21 0316     WBC 8.10 10*3/mm3      RBC 4.02 10*6/mm3      Hemoglobin 11.9 g/dL      Hematocrit 35.7 %      MCV 88.9 fL      MCH 29.6 pg      MCHC 33.3 g/dL      RDW 17.2 %      RDW-SD 54.7 fl      MPV 9.3 fL      Platelets 213 10*3/mm3      Neutrophil % 63.7 %      Lymphocyte % 27.2 %      Monocyte % 8.5 %      Eosinophil % 0.0 %      Basophil % 0.6 %      Neutrophils, Absolute 5.10 10*3/mm3      Lymphocytes, Absolute 2.20 10*3/mm3      Monocytes, Absolute 0.70 10*3/mm3      Eosinophils, Absolute 0.00 10*3/mm3      Basophils, Absolute 0.00 10*3/mm3      nRBC 0.1 /100 WBC     aPTT [[id number]]  (Abnormal) Collected: 02/28/21 2350    Specimen: Blood Updated: 03/01/21 0040     .6 seconds     POC Glucose Once [[id number]]  (Abnormal) Collected: 02/28/21 1958    Specimen: Blood Updated: 02/28/21 1959     Glucose 134 mg/dL      Comment: Serial Number: [id number]:  [id number]       aPTT [[id number]]  (Abnormal) Collected: 02/28/21 1622    Specimen: Blood Updated: 02/28/21 1644     PTT 55.5 seconds      Comment: Result checked            No results found for: HGBA1C  Results from last 7  days   Lab Units 02/25/21 2005   INR  1.09           No results found for: LIPASE  Lab Results   Component Value Date    CHOL 137 12/31/2020    TRIG 129 12/31/2020    HDL 46 12/31/2020    LDL 68 12/31/2020       No results found for: INTRAOP, PREDX, FINALDX, COMDX    Microbiology Results (last 10 days)     ** No results found for the last 240 hours. **          ECG/EMG Results (most recent)     Procedure Component Value Units Date/Time    ECG 12 Lead [[id number]] Collected: 02/19/21 1204     Updated: 02/19/21 1400     QT Interval 330 ms     Narrative:      HEART RATE= 112  bpm  RR Interval= 536  ms  TX Interval= 127  ms  P Horizontal Axis= -35  deg  P Front Axis= 35  deg  QRSD Interval= 69  ms  QT Interval= 330  ms  QRS Axis= -25  deg  T Wave Axis= 47  deg  - ABNORMAL ECG -  Sinus tachycardia with irregular rate  Left atrial enlargement  Low voltage, precordial leads  Probable left ventricular hypertrophy  Anterior Q waves, possibly due to LVH  When compared with ECG of 30-Dec-2020 12:53:14,  New or worsened ischemia or infarction  Significant axis, voltage or hypertrophy change  Electronically Signed By: Shan Morocho (LAMAR) 19-Feb-2021 13:59:38  Date and Time of Study: 2021-02-19 12:04:34    Adult Transthoracic Echo Complete W/ Cont if Necessary Per Protocol [[id number]] Collected: 02/26/21 0955     Updated: 02/26/21 1720     BSA 2.2 m^2      RVIDd 3.2 cm      IVSd 1.0 cm      IVSs 1.2 cm      LVIDd 5.0 cm      LVIDs 4.0 cm      LVPWd 0.82 cm      BH CV ECHO PATY - LVPWS 1.3 cm      IVS/LVPW 1.3     FS 20.1 %      EDV(Teich) 116.9 ml      ESV(Teich) 69.0 ml      EF(Teich) 41.0 %      EDV(cubed) 123.2 ml      ESV(cubed) 62.8 ml      EF(cubed) 49.0 %      % IVS thick 13.4 %      % LVPW thick 52.4 %      LV mass(C)d 163.5 grams      LV mass(C)dI 73.9 grams/m^2      LV mass(C)s 166.7 grams      LV mass(C)sI 75.3 grams/m^2      SV(Teich) 48.0 ml      SI(Teich) 21.7 ml/m^2      SV(cubed) 60.4 ml      SI(cubed) 27.3 ml/m^2       Ao root diam 2.5 cm      Ao root area 4.8 cm^2      ACS 2.0 cm      LVOT diam 2.0 cm      LVOT area 3.1 cm^2      EDV(MOD-sp4) 52.2 ml      ESV(MOD-sp4) 30.7 ml      EF(MOD-sp4) 41.2 %      SV(MOD-sp4) 21.5 ml      SI(MOD-sp4) 9.7 ml/m^2      Ao root area (BSA corrected) 1.1     LV [place]o Vol (BSA corrected) 23.6 ml/m^2      LV Sys Vol (BSA corrected) 13.9 ml/m^2      MV E max chadd 33.3 cm/sec      MV A max chadd 116.9 cm/sec      MV E/A 0.28     MV V2 max 123.6 cm/sec      MV max PG 6.1 mmHg      MV V2 mean 70.4 cm/sec      MV mean PG 2.3 mmHg      MV V2 VTI 24.1 cm      MVA(VTI) 2.0 cm^2      MV dec slope 273.9 cm/sec^2      MV dec time 0.23 sec      Ao pk chadd 102.5 cm/sec      Ao max PG 4.2 mmHg      Ao max PG (full) 0.54 mmHg      Ao V2 mean 78.6 cm/sec      Ao mean PG 2.6 mmHg      Ao mean PG (full) 0.65 mmHg      Ao V2 VTI 17.1 cm      KARLEY(I,A) 2.8 cm^2      KARLEY(I,D) 2.8 cm^2      KARLEY(V,A) 2.9 cm^2      KARLEY(V,D) 2.9 cm^2      LV V1 max PG 3.7 mmHg      LV V1 mean PG 2.0 mmHg      LV V1 max 95.8 cm/sec      LV V1 mean 64.8 cm/sec      LV V1 VTI 15.5 cm      MR max chadd 481.2 cm/sec      MR max PG 92.6 mmHg      SV(Ao) 82.5 ml      SI(Ao) 37.2 ml/m^2      SV(LVOT) 48.2 ml      SI(LVOT) 21.8 ml/m^2      PA V2 max 98.1 cm/sec      PA max PG 3.9 mmHg      PA max PG (full) 1.2 mmHg      PA V2 mean 65.2 cm/sec      PA mean PG 1.9 mmHg      PA mean PG (full) 0.72 mmHg      PA V2 VTI 17.4 cm      PA acc time 0.07 sec      PI end-d chadd 146.2 cm/sec      PI max chadd 226.2 cm/sec      PI max PG 20.5 mmHg      RV V1 max PG 2.6 mmHg      RV V1 mean PG 1.1 mmHg      RV V1 max 80.8 cm/sec      RV V1 mean 49.7 cm/sec      RV V1 VTI 12.7 cm      TR max chadd 331.2 cm/sec      RVSP(TR) 51.9 mmHg      RAP systole 8.0 mmHg      PA pr(Accel) 49.4 mmHg       CV ECHO PATY - BZI_BMI 48.7 kilograms/m^2       CV ECHO PATY - BSA(HAYCOCK) 2.4 m^2       CV ECHO PATY - BZI_METRIC_WEIGHT 124.7 kg       CV ECHO PATY - BZI_METRIC_HEIGHT  160.0 cm      Target HR (85%) 128 bpm      Max. Pred. HR (100%) 151 bpm      EF(MOD-bp) 41.0 %      LA dimension(2D) 4.6 cm      Echo EF Estimated 40 %     Narrative:      · Estimated left ventricular EF = 40% Left ventricular systolic function   is mildly decreased.  · Left ventricular diastolic dysfunction is noted.  · The right ventricular cavity is borderline dilated.  · Mildly reduced right ventricular systolic function noted.  · The right atrial cavity is mildly dilated.  · Severe tricuspid valve regurgitation is present.  · Estimated right ventricular systolic pressure from tricuspid   regurgitation is moderately elevated (45-55 mmHg).  · Moderate to severe pulmonary hypertension is present.  · Compared to the echocardiogram from December 2020 RV size is probably   mildly enlarged but tricuspid insufficiency severity unchanged pulmonary   pressures were actually mildly reduced                  Results for orders placed during the hospital encounter of 02/19/21   Adult Transthoracic Echo Complete W/ Cont if Necessary Per Protocol    Narrative · Estimated left ventricular EF = 40% Left ventricular systolic function   is mildly decreased.  · Left ventricular diastolic dysfunction is noted.  · The right ventricular cavity is borderline dilated.  · Mildly reduced right ventricular systolic function noted.  · The right atrial cavity is mildly dilated.  · Severe tricuspid valve regurgitation is present.  · Estimated right ventricular systolic pressure from tricuspid   regurgitation is moderately elevated (45-55 mmHg).  · Moderate to severe pulmonary hypertension is present.  · Compared to the echocardiogram from December 2020 RV size is probably   mildly enlarged but tricuspid insufficiency severity unchanged pulmonary   pressures were actually mildly reduced          Ct Chest Pulmonary Embolism    Result Date: 2/25/2021  1.Pulmonary emboli in the distal right pulmonary artery extending into all lobes. Pulmonary  emboli are also seen in the segmental and subsegmental left upper and lower lobe pulmonary arteries. 2.Enlargement of the right atrium and reflux of contrast into the IVC and hepatic veins may indicate right heart strain. Enlargement of the pulmonary artery, as before, suggests pulmonary artery hypertension. 3.Nonspecific splenomegaly.  Findings were called to Dr. Muniz by Dr. Healy 2/25/2021 7:14 PM  Electronically Signed By-Rafa Healy MD On:2/25/2021 7:15 PM This report was finalized on [id number] by  Rafa Healy MD.          Xrays, labs reviewed personally by physician.    Medication Review:   I have reviewed the patient's current medication list      Scheduled Meds  allopurinol, 100 mg, Oral, Daily  aspirin, 325 mg, Oral, Daily  budesonide-formoterol, 2 puff, Inhalation, BID - RT  insulin lispro, 0-9 Units, Subcutaneous, TID AC  metoprolol succinate XL, 25 mg, Oral, Daily  polyethylene glycol, 17 g, Oral, Daily  sodium chloride, 10 mL, Intravenous, Q12H  spironolactone, 25 mg, Oral, Daily  torsemide, 20 mg, Oral, Daily        Meds Infusions  heparin, 11.9 Units/kg/hr, Last Rate: 12.7 Units/kg/hr (03/01/21 1106)  Pharmacy to dose warfarin,   Pharmacy to dose warfarin,         Meds PRN  •  acetaminophen **OR** acetaminophen **OR** acetaminophen  •  albuterol sulfate HFA  •  aluminum-magnesium hydroxide-simethicone  •  atropine  •  dextrose  •  dextrose  •  diphenhydrAMINE  •  glucagon (human recombinant)  •  heparin  •  heparin  •  insulin lispro **AND** insulin lispro  •  magnesium sulfate **OR** magnesium sulfate in D5W 1g/100mL (PREMIX)  •  nitroglycerin  •  ondansetron **OR** ondansetron  •  Pharmacy to dose warfarin  •  Pharmacy to dose warfarin  •  potassium chloride  •  [COMPLETED] Insert peripheral IV **AND** sodium chloride  •  sodium chloride  •  sodium chloride        Assessment/Plan   Assessment/Plan     Active Hospital Problems    Diagnosis  POA   • **Acute on chronic combined systolic and  diastolic CHF (congestive heart failure) (CMS/[facility]) [I50.43]  Yes   • Acute pulmonary embolism with acute cor pulmonale (CMS/[facility]) [I26.09]  Yes   • MODESTO (acute kidney injury) (CMS/[facility]) [N17.9]  Yes   • Severe mitral regurgitation [I34.0]  Yes   • Severe tricuspid regurgitation [I07.1]  Yes   • Pulmonary hypertension (CMS/[facility]) [I27.20]  Yes   • Cor pulmonale (chronic) (CMS/[facility]) [I27.81]  Yes   • Type 2 diabetes mellitus without complication, without long-term current use of insulin (CMS/[facility]) [E11.9]  Yes   • Dilated cardiomyopathy (CMS/[facility]) [I42.0]  Yes   • Essential hypertension [I10]  Yes   • Obesity, morbid, BMI 50 or higher (CMS/[facility]) [E66.01]  Yes   • Gout [M10.9]  Yes      Resolved Hospital Problems   No resolved problems to display.       MEDICAL DECISION MAKING COMPLEXITY BY PROBLEM:     Acute on chronic HFrEF likely d/t MR, TR and pulmonary hypertension  -s/p C/RHC 2/22/21: LAD disease, evere pulmonary HTN, severe tricuspid and mitral regurg  -on low dose diuretics per nephro  -cardiology consulted  -per CV surgery note, no surgery  with acute PE  -on toprol XL     Acute Pulmonary embolism  -V/Q said probability was high for PE  -CT PE eventually able to be done 2/25/21:Pulmonary emboli in the distal right pulmonary artery extending into all lobes. also seen in the segmental and subsegmental left upper and lower lobe pulmonary arteries  -TTE 2/26/2021: Minimal RV dilation and improving pulmonary hypertension  -on heparin gtt now  -cannot afford eliquis, thus will have to bridge coumadin, start when ok with cardiology  -We will discuss with cardiology regarding initiation on Coumadin     CAD  -s/p [facility] 2/22/2021  -needs intervention (PCI) to LAD vs surgery, awaiting definitive plans per primary cardiologist      Severe MR severe TR  -See above     Severe pulmonary hypertension  -O2 is stable on room air     MODESTO on CKD III  -nephrology following  -on low dose torsemide and aldactone per nephro     Diabetes  mellitus  -on SSI        Hypertension:  -Continue home medications          VTE Prophylaxis -   Mechanical Order History:     None      Pharmalogical Order History:      Ordered     Dose Route Frequency Stop    03/01/21 1638  Pharmacy to dose warfarin     Question:  Target INR  Answer:  2 - 3    -- XX Continuous PRN --    03/01/21 1638  Pharmacy to dose warfarin     Question:  Target INR  Answer:  2 - 3    -- XX Continuous PRN --    02/25/21 1919  heparin bolus from bag 10,000 Units      10,000 Units IV Once 02/26/21 0054    02/25/21 1919  heparin 81682 units/250 mL (100 units/mL) in 0.45 % NaCl infusion  14.99 mL/hr      11.9 Units/kg/hr IV Titrated --    02/25/21 1919  heparin bolus from bag 5,000 Units      40 Units/kg IV Every 6 Hours PRN --    02/25/21 1919  heparin bolus from bag 10,000 Units      10,000 Units IV Every 6 Hours PRN --    02/25/21 1338  enoxaparin (LOVENOX) injection 130 mg  Status:  Discontinued      130 mg SC Every 12 Hours 02/25/21 1919    02/24/21 1344  enoxaparin (LOVENOX) injection 130 mg  Status:  Discontinued      130 mg SC Every 12 Hours 02/24/21 1419    02/24/21 1419  enoxaparin (LOVENOX) injection 130 mg  Status:  Discontinued      130 mg SC Every 24 Hours 02/25/21 1338    02/22/21 0734  enoxaparin (LOVENOX) injection 130 mg  Status:  Discontinued      1 mg/kg SC Every 24 Hours 02/24/21 1344    02/20/21 0933  enoxaparin (LOVENOX) injection 130 mg  Status:  Discontinued      1 mg/kg SC Every 12 Hours 02/22/21 0734    02/19/21 1522  heparin (porcine) 5000 UNIT/ML injection 5,000 Units  Status:  Discontinued      5,000 Units SC Every 12 Hours Scheduled 02/20/21 0933                  Code Status -   Code Status and Medical Interventions:   Ordered at: 02/19/21 1359     Level Of Support Discussed With:    Patient     Code Status:    CPR     Medical Interventions (Level of Support Prior to Arrest):    Full       This patient has been examined wearing appropriate Personal Protective  Equipment and discussed with nursing. 03/01/21        Discharge Planning  defer        Electronically signed by Hever Person DO, 03/01/21, 16:39 EST.  [facility] Hospitalist Team

## 2021-03-01 NOTE — PROGRESS NOTES
"PULMONARY CRITICAL CARE Progress  NOTE      PATIENT IDENTIFICATION:  Name: Jeanna Brown  MRN: [id number]  :  1950     Age: 70 y.o.  Sex: female    DATE OF Note:  3/1/2021   Referring Physician: Hever Person DO                  Subjective:   Feeling better, sitting up in room, no SOB,  no chest pain, no nausea or vomiting, no change in bowel habit, no dysuria,  no new  skin rash or itching.      Objective:  tMax 24 hrs: Temp (24hrs), Av.8 °F (36.6 °C), Min:97.2 °F (36.2 °C), Max:98.1 °F (36.7 °C)      Vitals Ranges:   Temp:  [97.2 °F (36.2 °C)-98.1 °F (36.7 °C)] 97.9 °F (36.6 °C)  Heart Rate:  [] 93  Resp:  [12-29] 20  BP: (105-133)/(64-90) 107/81    Intake and Output Last 3 Shifts:   I/O last 3 completed shifts:  In: 1360 [P.O.:1360]  Out: 1200 [Urine:1200]    Exam:  /81   Pulse 93   Temp 97.9 °F (36.6 °C) (Oral)   Resp 20   Ht 160 cm (63\")   Wt 128 kg (281 lb 12 oz)   SpO2 98%   BMI 49.91 kg/m²     General Appearance: AAO    HEENT:  Normocephalic, without obvious abnormality, Conjunctiva/corneas clear,.  Normal external ear canals, Nares normal, no drainage     Neck:  Supple, symmetrical, trachea midline. No JVD.  Lungs /Chest wall:   Bilateral basal rhonchi, respirations unlabored, symmetrical wall movement.    Heart:  Regular rate and rhythm, systolic murmur PMI left sternal border  Abdomen: Soft, non-tender, no masses, no organomegaly.    Extremities: Trace edema no clubbing or Cyanosis        Medications:    Current Facility-Administered Medications:   •  acetaminophen (TYLENOL) tablet 650 mg, 650 mg, Oral, Q4H PRN **OR** acetaminophen (TYLENOL) 160 MG/5ML solution 650 mg, 650 mg, Oral, Q4H PRN **OR** acetaminophen (TYLENOL) suppository 650 mg, 650 mg, Rectal, Q4H PRN, Kit Jacques MD  •  albuterol sulfate HFA (PROVENTIL HFA;VENTOLIN HFA;PROAIR HFA) inhaler 2 puff, 2 puff, Inhalation, Q4H PRN, Kit Jacques MD  •  allopurinol " (ZYLOPRIM) tablet 100 mg, 100 mg, Oral, Daily, Kit Jacques MD, 100 mg at 03/01/21 0818  •  aluminum-magnesium hydroxide-simethicone (MAALOX MAX) 400-400-40 MG/5ML suspension 15 mL, 15 mL, Oral, Q6H PRN, Kit Jacques MD  •  aspirin EC tablet 325 mg, 325 mg, Oral, Daily, Kit Jacques MD, 325 mg at 03/01/21 0818  •  atropine sulfate injection 0.5-1 mg, 0.5-1 mg, Intravenous, Q5 Min PRN, Kit Jacques MD  •  budesonide-formoterol (SYMBICORT) 80-4.5 MCG/ACT inhaler 2 puff, 2 puff, Inhalation, BID - RT, Kit Jacques MD, 2 puff at 03/01/21 0646  •  dextrose (D50W) 25 g/ 50mL Intravenous Solution 25 g, 25 g, Intravenous, Q15 Min PRN, Kit Jacques MD  •  dextrose (GLUTOSE) oral gel 15 g, 15 g, Oral, Q15 Min PRN, Kit Jacques MD  •  diphenhydrAMINE (BENADRYL) capsule 25 mg, 25 mg, Oral, Q6H PRN, Kit Jacques MD  •  glucagon (human recombinant) (GLUCAGEN DIAGNOSTIC) injection 1 mg, 1 mg, Subcutaneous, PRN, Kit Jacques MD  •  heparin 33125 units/250 mL (100 units/mL) in 0.45 % NaCl infusion, 11.9 Units/kg/hr, Intravenous, Titrated, Peggy Carty APRN, Last Rate: 16 mL/hr at 03/01/21 1106, 12.7 Units/kg/hr at 03/01/21 1106  •  heparin bolus from bag 10,000 Units, 10,000 Units, Intravenous, Q6H PRN, Peggy Carty, APRN  •  heparin bolus from bag 5,000 Units, 40 Units/kg, Intravenous, Q6H PRN, Peggy Carty, APRN, 5,000 Units at 03/01/21 1109  •  insulin lispro (ADMELOG) injection 0-9 Units, 0-9 Units, Subcutaneous, TID AC, 2 Units at 02/23/21 0810 **AND** insulin lispro (ADMELOG) injection 0-9 Units, 0-9 Units, Subcutaneous, PRN, Kit Jacques MD  •  Magnesium Sulfate 2 gram infusion - Mg less than or equal to 1.5 mg/dL, 2 g, Intravenous, PRN **OR** Magnesium Sulfate 1 gram infusion - Mg 1.6-1.9 mg/dL, 1 g, Intravenous, PRN, Kit Jacques MD  •  metoprolol  succinate XL (TOPROL-XL) 24 hr tablet 25 mg, 25 mg, Oral, Daily, Kit Jacques MD, 25 mg at 03/01/21 0818  •  nitroglycerin (NITROSTAT) SL tablet 0.4 mg, 0.4 mg, Sublingual, Q5 Min PRN, Kit Jacques MD  •  ondansetron (ZOFRAN) tablet 4 mg, 4 mg, Oral, Q6H PRN **OR** ondansetron (ZOFRAN) injection 4 mg, 4 mg, Intravenous, Q6H PRN, Kit Jacques MD, 4 mg at 02/23/21 1014  •  polyethylene glycol (MIRALAX) packet 17 g, 17 g, Oral, Daily, Jose Lindsey DO, 17 g at 02/28/21 0808  •  potassium chloride (K-DUR,KLOR-CON) CR tablet 40 mEq, 40 mEq, Oral, PRN, Kit Jacques MD  •  [COMPLETED] Insert peripheral IV, , , Once **AND** sodium chloride 0.9 % flush 10 mL, 10 mL, Intravenous, PRN, Kit Jacques MD  •  sodium chloride 0.9 % flush 10 mL, 10 mL, Intravenous, Q12H, Kit Jacques MD, 10 mL at 03/01/21 0818  •  sodium chloride 0.9 % flush 10 mL, 10 mL, Intravenous, PRN, Kit Jacques MD  •  sodium chloride 0.9 % infusion 250 mL, 250 mL, Intravenous, Once PRN, Kit Jacques MD  •  spironolactone (ALDACTONE) tablet 25 mg, 25 mg, Oral, Daily, Kit Jacques MD, 25 mg at 03/01/21 0818  •  torsemide (DEMADEX) tablet 20 mg, 20 mg, Oral, Daily, Kit Jacques MD, 20 mg at 03/01/21 0818    Data Review:  All labs (24hrs):   Recent Results (from the past 24 hour(s))   aPTT    Collection Time: 02/28/21  4:22 PM    Specimen: Blood   Result Value Ref Range    PTT 55.5 (L) 61.0 - 76.5 seconds   POC Glucose Once    Collection Time: 02/28/21  4:25 PM    Specimen: Blood   Result Value Ref Range    Glucose 117 (H) 70 - 105 mg/dL   POC Glucose Once    Collection Time: 02/28/21  7:58 PM    Specimen: Blood   Result Value Ref Range    Glucose 134 (H) 70 - 105 mg/dL   aPTT    Collection Time: 02/28/21 11:50 PM    Specimen: Blood   Result Value Ref Range    .6 (C) 61.0 - 76.5 seconds   CBC Auto  Differential    Collection Time: 03/01/21  2:59 AM    Specimen: Blood   Result Value Ref Range    WBC 8.10 3.40 - 10.80 10*3/mm3    RBC 4.02 3.77 - 5.28 10*6/mm3    Hemoglobin 11.9 (L) 12.0 - 15.9 g/dL    Hematocrit 35.7 34.0 - 46.6 %    MCV 88.9 79.0 - 97.0 fL    MCH 29.6 26.6 - 33.0 pg    MCHC 33.3 31.5 - 35.7 g/dL    RDW 17.2 (H) 12.3 - 15.4 %    RDW-SD 54.7 (H) 37.0 - 54.0 fl    MPV 9.3 6.0 - 12.0 fL    Platelets 213 140 - 450 10*3/mm3    Neutrophil % 63.7 42.7 - 76.0 %    Lymphocyte % 27.2 19.6 - 45.3 %    Monocyte % 8.5 5.0 - 12.0 %    Eosinophil % 0.0 (L) 0.3 - 6.2 %    Basophil % 0.6 0.0 - 1.5 %    Neutrophils, Absolute 5.10 1.70 - 7.00 10*3/mm3    Lymphocytes, Absolute 2.20 0.70 - 3.10 10*3/mm3    Monocytes, Absolute 0.70 0.10 - 0.90 10*3/mm3    Eosinophils, Absolute 0.00 0.00 - 0.40 10*3/mm3    Basophils, Absolute 0.00 0.00 - 0.20 10*3/mm3    nRBC 0.1 0.0 - 0.2 /100 WBC   Magnesium    Collection Time: 03/01/21  2:59 AM    Specimen: Blood   Result Value Ref Range    Magnesium 1.9 1.6 - 2.4 mg/dL   Basic Metabolic Panel    Collection Time: 03/01/21  2:59 AM    Specimen: Blood   Result Value Ref Range    Glucose 114 (H) 65 - 99 mg/dL    BUN 14 8 - 23 mg/dL    Creatinine 1.08 (H) 0.57 - 1.00 mg/dL    Sodium 138 136 - 145 mmol/L    Potassium 4.0 3.5 - 5.2 mmol/L    Chloride 103 98 - 107 mmol/L    CO2 24.0 22.0 - 29.0 mmol/L    Calcium 9.4 8.6 - 10.5 mg/dL    eGFR  African Amer 61 >60 mL/min/1.73    BUN/Creatinine Ratio 13.0 7.0 - 25.0    Anion Gap 11.0 5.0 - 15.0 mmol/L   aPTT    Collection Time: 03/01/21  2:59 AM    Specimen: Arm, Right; Blood   Result Value Ref Range    PTT 65.4 61.0 - 76.5 seconds   POC Glucose Once    Collection Time: 03/01/21  7:17 AM    Specimen: Blood   Result Value Ref Range    Glucose 134 (H) 70 - 105 mg/dL   aPTT    Collection Time: 03/01/21 10:33 AM    Specimen: Blood   Result Value Ref Range    PTT 56.7 (L) 61.0 - 76.5 seconds        Imaging:  Adult Transthoracic Echo Complete W/  Cont if Necessary Per Protocol  · Estimated left ventricular EF = 40% Left ventricular systolic function   is mildly decreased.  · Left ventricular diastolic dysfunction is noted.  · The right ventricular cavity is borderline dilated.  · Mildly reduced right ventricular systolic function noted.  · The right atrial cavity is mildly dilated.  · Severe tricuspid valve regurgitation is present.  · Estimated right ventricular systolic pressure from tricuspid   regurgitation is moderately elevated (45-55 mmHg).  · Moderate to severe pulmonary hypertension is present.  · Compared to the echocardiogram from December 2020 RV size is probably   mildly enlarged but tricuspid insufficiency severity unchanged pulmonary   pressures were actually mildly reduced          ASSESSMENT:    Acute on chronic combined systolic and diastolic CHF (congestive heart failure) (CMS/[facility])    Gout    Obesity, morbid, BMI 50 or higher (CMS/[facility])    Essential hypertension    Type 2 diabetes mellitus without complication, without long-term current use of insulin (CMS/[facility])    Dilated cardiomyopathy (CMS/HCC)    MODESTO (acute kidney injury) (CMS/[facility])    Severe mitral regurgitation    Severe tricuspid regurgitation    Pulmonary hypertension (CMS/HCC)    Cor pulmonale (chronic) (CMS/[facility])    Acute pulmonary embolism with acute cor pulmonale (CMS/HCC)  STEVIE       PLAN:  Heparin IV   Inhaled corticosteroids  Bowel regimen  Electrolytes/ glycemic control  GI prophylaxis  Needs OP work up for machine       Discussed with Dr Oren Yee, APRN  3/1/2021  11:53 EST    I personally have examined  and interviewed the patient. I have reviewed the history, data, problems, assessment and plan with our NP.  Critical care time in direct medical management (   ) minutes   Omer Lott MD, D,ABSM  1/23/2021

## 2021-03-01 NOTE — PLAN OF CARE
Goal Outcome Evaluation:  Plan of Care Reviewed With: patient  Progress: improving  Outcome Summary: Patient doing well today with no complaints. Remains on heparin gtt. Will monitor.

## 2021-03-01 NOTE — PROGRESS NOTES
"Pharmacy dosing service  Anticoagulant  Warfarin     Subjective:    Jeanna Parks is a 70 y.o.female being initiated on warfarin for PE.    INR Goal: 2 - 3  Home medication?: No  Bridge Therapy Present?:  Yes,  Heparin drip (Protocol high dose)  Interacting Medications Evaluation (New/Present/Discontinued): allopurinol, torsemide (both may increase INR)  Additional Contributing Factors: CHF exacerbation      Assessment/Plan:    New start warfarin for acute PE. INR very low at 1.03 (as expected). Will give warfarin 5 mg today but will dose cautiously given interacting medications and unsure of patient's sensitivity to warfarin. Recommend bridging until INR >2.      Continue to monitor and adjust based on INR. Daily INR ordered.         Date 3/1           INR 1.03           Dose 5 mg               Objective:  [Ht: 160 cm (63\"); Wt: 128 kg (281 lb 12 oz); BMI: Body mass index is 49.91 kg/m².]    Lab Results   Component Value Date    ALBUMIN 3.40 (L) 02/27/2021     Lab Results   Component Value Date    INR 1.03 (L) 03/01/2021    INR 1.09 02/25/2021    INR 0.95 12/30/2020    PROTIME 11.3 (L) 03/01/2021    PROTIME 11.9 (H) 02/25/2021    PROTIME 10.5 12/30/2020     Lab Results   Component Value Date    HGB 11.9 (L) 03/01/2021    HGB 11.8 (L) 02/27/2021    HGB 11.9 (L) 02/26/2021     Lab Results   Component Value Date    HCT 35.7 03/01/2021    HCT 35.3 02/27/2021    HCT 36.0 02/26/2021       Paola Driscoll, PharmD  03/01/21 17:35 EST     "

## 2021-03-01 NOTE — PROGRESS NOTES
"   LOS: 10 days    Patient Care Team:  Latanya Hawk MD as PCP - General (Family Medicine)  Kit Jacques MD as Consulting Physician (Cardiology)      Subjective     Patient is resting comfortably.  Denies any chest pain, shortness of breath,nausea  or vomiting      Objective     Vital Sign Min/Max for last 24 hours  Temp:  [97.2 °F (36.2 °C)-98.1 °F (36.7 °C)] 97.2 °F (36.2 °C)  Heart Rate:  [] 98  Resp:  [12-29] 16  BP: (105-139)/(48-90) 128/78                       Flowsheet Rows      First Filed Value   Admission Height  160 cm (63\") Documented at 02/19/2021 1154   Admission Weight  121 kg (266 lb) Documented at 02/19/2021 1154          I/O this shift:  In: 240 [P.O.:240]  Out: -   I/O last 3 completed shifts:  In: 1360 [P.O.:1360]  Out: 1200 [Urine:1200]    Physical Exam:  Physical Exam    General.  Awake, alert  Head.  Atraumatic, normocephalic  Neck.  Supple  Respiratory.  Clear to auscultation bilaterally  Cardiovascular.  Regular rhythm  Abdomen.  Obese, soft, nontender  Extremities.  No edema     LABS:  Lab Results   Component Value Date    CALCIUM 9.4 03/01/2021    PHOS 2.7 02/27/2021     Results from last 7 days   Lab Units 03/01/21  0259 02/28/21  0138 02/27/21  0141 02/26/21  0212   MAGNESIUM mg/dL 1.9 2.0 2.0 2.1   SODIUM mmol/L 138 138 136 139   POTASSIUM mmol/L 4.0 4.1 3.6 3.7   CHLORIDE mmol/L 103 104 103 105   CO2 mmol/L 24.0 21.0* 22.0 23.0   BUN mg/dL 14 19 27* 40*   CREATININE mg/dL 1.08* 1.14* 1.13* 1.31*   GLUCOSE mg/dL 114* 96 90 97   CALCIUM mg/dL 9.4 9.8 9.1 9.2   WBC 10*3/mm3 8.10  --  9.70 9.00   HEMOGLOBIN g/dL 11.9*  --  11.8* 11.9*   PLATELETS 10*3/mm3 213  --  196 221     Lab Results   Component Value Date    TROPONINT 0.018 02/19/2021     Estimated Creatinine Clearance: 64.1 mL/min (A) (by C-G formula based on SCr of 1.08 mg/dL (H)).      Brief Urine Lab Results  (Last result in the past 365 days)      Color   Clarity   Blood   Leuk Est   Nitrite   " Protein   CREAT   Urine HCG        02/20/21 1530 Yellow Cloudy  Comment:  Result checked  Negative Negative Negative Negative             WEIGHTS:     Wt Readings from Last 1 Encounters:   03/01/21 0500 128 kg (281 lb 12 oz)   02/28/21 0500 127 kg (280 lb 13.9 oz)   02/27/21 0500 124 kg (274 lb 0.5 oz)   02/26/21 1049 125 kg (275 lb)   02/26/21 0511 125 kg (275 lb 5.7 oz)   02/25/21 0508 126 kg (277 lb 5.4 oz)   02/24/21 0509 125 kg (276 lb 3.8 oz)   02/23/21 0623 130 kg (286 lb 9.6 oz)   02/22/21 1839 129 kg (285 lb 7.9 oz)   02/22/21 0456 126 kg (277 lb 9 oz)   02/21/21 0253 126 kg (277 lb 5.4 oz)   02/19/21 2032 128 kg (281 lb 4.9 oz)   02/19/21 1523 121 kg (266 lb)   02/19/21 1154 121 kg (266 lb)       allopurinol, 100 mg, Oral, Daily  aspirin, 325 mg, Oral, Daily  budesonide-formoterol, 2 puff, Inhalation, BID - RT  insulin lispro, 0-9 Units, Subcutaneous, TID AC  metoprolol succinate XL, 25 mg, Oral, Daily  polyethylene glycol, 17 g, Oral, Daily  sodium chloride, 10 mL, Intravenous, Q12H  spironolactone, 25 mg, Oral, Daily  torsemide, 20 mg, Oral, Daily      heparin, 11.9 Units/kg/hr, Last Rate: 10.952 Units/kg/hr (03/01/21 0142)        Assessment/Plan       1.  Acute kidney injury  Creatinine improving slowly.  Electrolytes, volume status okay    2.Coronary artery disease  Cardiac cath showed LAD lesion with MR, TR and pulmonary hypertension    3.CHFrEF with MR, TR and pulmonary hypertension    4.Pulmonary Embolism  On heparin drip     Recs:  Patient's renal function is improving  Electrolytes okay.  Symptomatically improving  Continue current diuretics.  I will check with Cardiology if still planning for cardiac cath      Chris Dahl MD  03/01/21  10:06 EST

## 2021-03-01 NOTE — PROGRESS NOTES
"Nutrition Services    Patient Name: Jeanna Parks  YOB: 1950  MRN: [id number]  Admission date: 2/19/2021    PPE Documentation        PPE Worn By Provider mask and eye protection   PPE Worn By Patient  None     NUTRITION SCREENING      Encounter Information: 3/1: Pt assessed due to LOS x 10 days. Pt is very complimentary of hospital food during visit; states she has enjoyed the food here and has good appetite. Denies any chewing/swallowing difficulty or any other functional issues that could inhibit adequate intake. Reports good intake at meals -- generally eating most of foods served or all of foods served at meals (documented Hx is C/W this, reflecting % intake).        PO Diet: Diet Cardiac, Diabetic/Consistent Carbs; Healthy Heart; Diabetic - Consistent Carb   PO Supplements: None ordered   PO Intake:  Very good intake (generally % intake at meals)        Labs (reviewed below): Cr mildly elevated, monitor   Glucose elevated at times, C/W DM; has carbohydrate-controlled diet in place        GI Function:  BM 2/28 (yesterday)        Skin: No breakdown       Weight Hx Review: Estimated body mass index is 49.91 kg/m² as calculated from the following:    Height as of this encounter: 160 cm (63\").    Weight as of this encounter: 128 kg (281 lb 12 oz).    Stable weight; no recent weight loss.       Nutrition Intervention: Continue current diet as ordered, which is meeting needs.       Results from last 7 days   Lab Units 03/01/21 0259 02/28/21 0138 02/27/21  0141   SODIUM mmol/L 138 138 136   POTASSIUM mmol/L 4.0 4.1 3.6   CHLORIDE mmol/L 103 104 103   CO2 mmol/L 24.0 21.0* 22.0   BUN mg/dL 14 19 27*   CREATININE mg/dL 1.08* 1.14* 1.13*   CALCIUM mg/dL 9.4 9.8 9.1   GLUCOSE mg/dL 114* 96 90     Results from last 7 days   Lab Units 03/01/21 0259 02/28/21 0138 02/27/21  0141   MAGNESIUM mg/dL 1.9 2.0 2.0   PHOSPHORUS mg/dL  --   --  2.7   HEMOGLOBIN g/dL 11.9*  --  11.8*   HEMATOCRIT % " 35.7  --  35.3     COVID19   Date Value Ref Range Status   02/19/2021 Not Detected Not Detected - Ref. Range Final     Lab Results   Component Value Date    HGBA1C 5.4 12/30/2020       RD to follow up per protocol.    Electronically signed by:  Clarisa Snow RD  03/01/21 15:58 EST

## 2021-03-01 NOTE — PROGRESS NOTES
Continued Stay Note  MANNY Rodriguez     Patient Name: Jeanna Parks  MRN: [id number]  Today's Date: 3/1/2021    Admit Date: 2/19/2021    Discharge Plan     Row Name 03/01/21 1512       Plan    Plan  D/C Plan : Home with family pending CV surgery decision , valve surgery vs. a stent .    Plan Comments  Barrier to D/C : Pt is awaiting CV surgery's decision . Pt remains on a Hep gtt for a PE . Pt to be started on coumadin , unable to afford Eliquis.        Discharge Codes    No documentation.             Virginia Barron RN

## 2021-03-01 NOTE — PROGRESS NOTES
CARDIOLOGY FOLLOW-UP PROGRESS NOTE      Reason for follow-up:    HF R EF (35 to 40% by last echo 2 months ago)  Severe MR  Severe TR with pulmonary hypertension  Essential hypertension-well-regulated  COPD with mild acute exacerbation  DM2  MODESTO/CKD  Patient readmitted with systolic congestive heart failure   acute pulmonary embolus     Attending: Hever Person,*      SUBJECTIVE:  Feels okay today, wants to go home    Review of Systems   General: denies fever, chills, anorexia, weight loss  Eyes: denies blurring, diplopia  Ear/Nose/Throat: denies ear pain, nosebleeds, hoarseness  Cardiovascular: See HPI  Respiratory: denies excessive sputum, hemoptysis, wheezing  Gastrointestinal: denies nausea, vomiting, change in bowel habits, abdominal pain  Genitourinary: denies dysuria and hematuria  Musculoskeletal: denies back pain, joint pain, joint swelling, muscle cramps, weakness  Skin: denies rashes, itching, suspicious lesions  Neurologic: denies focal neuro deficits  Psychiatric: denies depression, anxiety  Endocrine: denies cold intolerance, heat intolerance  Hematologic/Lymphatic: denies abnormal bruising, bleeding  Allergic/Immunologic: denies urticaria or persistent infections  I reviewed all the systems again denies of any problems    Allergies: Patient has no known allergies.    Scheduled Meds:allopurinol, 100 mg, Oral, Daily  aspirin, 325 mg, Oral, Daily  budesonide-formoterol, 2 puff, Inhalation, BID - RT  insulin lispro, 0-9 Units, Subcutaneous, TID AC  metoprolol succinate XL, 25 mg, Oral, Daily  polyethylene glycol, 17 g, Oral, Daily  sodium chloride, 10 mL, Intravenous, Q12H  spironolactone, 25 mg, Oral, Daily  torsemide, 20 mg, Oral, Daily        Continuous Infusions:heparin, 11.9 Units/kg/hr, Last Rate: 12.7 Units/kg/hr (03/01/21 1106)  Pharmacy to dose warfarin,   Pharmacy to dose warfarin,         PRN Meds:.•  acetaminophen **OR** acetaminophen **OR** acetaminophen  •  albuterol sulfate HFA  •   aluminum-magnesium hydroxide-simethicone  •  atropine  •  dextrose  •  dextrose  •  diphenhydrAMINE  •  glucagon (human recombinant)  •  heparin  •  heparin  •  insulin lispro **AND** insulin lispro  •  magnesium sulfate **OR** magnesium sulfate in D5W 1g/100mL (PREMIX)  •  nitroglycerin  •  ondansetron **OR** ondansetron  •  Pharmacy to dose warfarin  •  Pharmacy to dose warfarin  •  potassium chloride  •  [COMPLETED] Insert peripheral IV **AND** sodium chloride  •  sodium chloride  •  sodium chloride    Objective Looks comfortable lying in the bed    Vital Signs  Vitals:    03/01/21 0500 03/01/21 0543 03/01/21 1100 03/01/21 1553   BP:  128/78 107/81 102/56   Pulse:  98 93 87   Resp:  16 20 20   Temp:  97.2 °F (36.2 °C) 97.9 °F (36.6 °C) 97.2 °F (36.2 °C)   TempSrc:   Oral Oral   SpO2:  96% 98% 96%   Weight: 128 kg (281 lb 12 oz)      Height:         Wt Readings from Last 1 Encounters:   03/01/21 128 kg (281 lb 12 oz)       Physical Exam: Physical exam today no significant changes compared to yesterday     General Appearance:    Alert, oriented, in mild respiratory distress with a level of activity   Neck:  Supple without JVD or bruit   Lungs:    Adequate global airflow though diminished in the bases with scattered rhonchi, no wheezes heard    Heart:   Distant heart tones with regular rate and rhythm, soft systolic ejection murmur   Chest Wall/Thorax:    No abnormalities observed   Abdomen:     Normal bowel sounds, no masses, no organomegaly, soft        non-tender, non-distended, no guarding, no rebound                tenderness   Extremities:  Range of motion adequate, no edema, no cyanosis, no             redness   Pulses:   Pulses palpable and equal bilaterally   Skin:   No bleeding, bruising or rash   Neurologic:  No focal deficits               Results Review:     CBC    Results from last 7 days   Lab Units 03/01/21  0259 02/27/21  0141 02/26/21  0212 02/25/21 2005 02/25/21  0201 02/24/21  0148  02/23/21  0224   WBC 10*3/mm3 8.10 9.70 9.00 9.80 9.90 8.40 9.00   HEMOGLOBIN g/dL 11.9* 11.8* 11.9* 12.6 12.3 11.8* 11.7*   PLATELETS 10*3/mm3 213 196 221 247 230 226 206     BMP   Results from last 7 days   Lab Units 03/01/21  0259 02/28/21  0138 02/27/21  0141 02/26/21  0212 02/25/21  0201 02/24/21  0148 02/23/21  0224   SODIUM mmol/L 138 138 136 139 142 138 138   POTASSIUM mmol/L 4.0 4.1 3.6 3.7 4.1 4.1 4.2   CHLORIDE mmol/L 103 104 103 105 105 103 103   CO2 mmol/L 24.0 21.0* 22.0 23.0 23.0 19.0* 24.0   BUN mg/dL 14 19 27* 40* 51* 53* 57*   CREATININE mg/dL 1.08* 1.14* 1.13* 1.31* 1.73* 1.84* 2.03*   GLUCOSE mg/dL 114* 96 90 97 100* 113* 133*   MAGNESIUM mg/dL 1.9 2.0 2.0 2.1 2.4 2.3 2.2   PHOSPHORUS mg/dL  --   --  2.7 3.1 3.4 3.1 4.0     CMP   Results from last 7 days   Lab Units 03/01/21  0259 02/28/21  0138 02/27/21  0141 02/26/21  0212 02/25/21  0201 02/24/21  0148 02/23/21  0224   SODIUM mmol/L 138 138 136 139 142 138 138   POTASSIUM mmol/L 4.0 4.1 3.6 3.7 4.1 4.1 4.2   CHLORIDE mmol/L 103 104 103 105 105 103 103   CO2 mmol/L 24.0 21.0* 22.0 23.0 23.0 19.0* 24.0   BUN mg/dL 14 19 27* 40* 51* 53* 57*   CREATININE mg/dL 1.08* 1.14* 1.13* 1.31* 1.73* 1.84* 2.03*   GLUCOSE mg/dL 114* 96 90 97 100* 113* 133*   ALBUMIN g/dL  --   --  3.40* 3.40* 3.40* 3.50 3.70     Radiology(recent) No radiology results for the last day    Cardiac Studies:  No new studies performed; telemetry reviewed shows sinus rhythm    Medication Review:   Scheduled Meds:allopurinol, 100 mg, Oral, Daily  aspirin, 325 mg, Oral, Daily  budesonide-formoterol, 2 puff, Inhalation, BID - RT  insulin lispro, 0-9 Units, Subcutaneous, TID AC  metoprolol succinate XL, 25 mg, Oral, Daily  polyethylene glycol, 17 g, Oral, Daily  sodium chloride, 10 mL, Intravenous, Q12H  spironolactone, 25 mg, Oral, Daily  torsemide, 20 mg, Oral, Daily      Continuous Infusions:heparin, 11.9 Units/kg/hr, Last Rate: 12.7 Units/kg/hr (03/01/21 1106)  Pharmacy to dose  warfarin,   Pharmacy to dose warfarin,       PRN Meds:.•  acetaminophen **OR** acetaminophen **OR** acetaminophen  •  albuterol sulfate HFA  •  aluminum-magnesium hydroxide-simethicone  •  atropine  •  dextrose  •  dextrose  •  diphenhydrAMINE  •  glucagon (human recombinant)  •  heparin  •  heparin  •  insulin lispro **AND** insulin lispro  •  magnesium sulfate **OR** magnesium sulfate in D5W 1g/100mL (PREMIX)  •  nitroglycerin  •  ondansetron **OR** ondansetron  •  Pharmacy to dose warfarin  •  Pharmacy to dose warfarin  •  potassium chloride  •  [COMPLETED] Insert peripheral IV **AND** sodium chloride  •  sodium chloride  •  sodium chloride    Assessment:  HF R EF (35 to 40% by last echo 2 months ago)  Severe MR  Severe TR with pulmonary hypertension  Essential hypertension-well-regulated  COPD with mild acute exacerbation  DM2  MODESTO/CKD  Premorbid exogenous obesity next BMI 50  Advanced DJD especially left knee; relative immobility      Plan:  Low-dose torsemide started today though worrisome worsening of azotemia noted with BUN increased from 26-47/serum creatinine increased from 1.54-1.94 in 2 days.  Discussed with the patient and the nephrologist today  Patient creatinine definitely went up  So I discussed with the nephrology and decided to proceed with cardiac cath with the dye worked to decrease the contrast burden  Patient received less than 50 cc of contrast  Her right heart cath showed severe pulmonary hypertension elevated right atrial pressure but LVEDP is normal  Coronaries showed severe LAD disease  Transesophageal echocardiogram showed severe TR at least moderate if not moderate to severe MR at that time blood pressure systolic was only 90 mmHg  So I discussed with CV surgery and interventional cardiology  If patient is a candidate she can undergo mitral and tricuspid valve repair and OSBORNE to LAD  Or just PCI to the LAD and aggressive medical treatment  Needs to monitor creatinine closely  Continue  beta-blockers may not tolerate Aldactone or ACE inhibitor  Diuresis per nephrology   Discussed with CV surgery they would like pulmonary and nephrology evaluation prior to make addition  Pulmonary saw the patient and patient underwent CT scan of the chest which showed PE  Anticoagulation restarted  Repeat echocardiogram was reviewed RV does not changed much compared to the last echocardiogram  Pulmonary hypertension noted  It was decided to treat aggressively with anticoagulation and diuresis  Patient renal function is getting better  We will discuss with CV surgery regarding options  Patient would like to go home on anticoagulation but she is only on IV heparin we will start the warfarin  Needs bridging will defer that to the primary team    Kit Jacques MD  03/01/21  16:50 EST      This report was generated using the Dragon voice recognition system.

## 2021-03-02 ENCOUNTER — READMISSION MANAGEMENT (OUTPATIENT)
Dept: CALL CENTER | Facility: HOSPITAL | Age: 71
End: 2021-03-02

## 2021-03-02 ENCOUNTER — TELEPHONE (OUTPATIENT)
Dept: FAMILY MEDICINE CLINIC | Facility: CLINIC | Age: 71
End: 2021-03-02

## 2021-03-02 VITALS
SYSTOLIC BLOOD PRESSURE: 106 MMHG | RESPIRATION RATE: 20 BRPM | BODY MASS INDEX: 49.26 KG/M2 | OXYGEN SATURATION: 100 % | HEIGHT: 63 IN | DIASTOLIC BLOOD PRESSURE: 66 MMHG | HEART RATE: 85 BPM | TEMPERATURE: 97.7 F | WEIGHT: 278 LBS

## 2021-03-02 PROBLEM — I50.43 ACUTE ON CHRONIC COMBINED SYSTOLIC AND DIASTOLIC CHF (CONGESTIVE HEART FAILURE) (HCC): Status: RESOLVED | Noted: 2021-01-08 | Resolved: 2021-03-02

## 2021-03-02 PROBLEM — N17.9 AKI (ACUTE KIDNEY INJURY) (HCC): Status: RESOLVED | Noted: 2021-02-19 | Resolved: 2021-03-02

## 2021-03-02 LAB
ALBUMIN SERPL-MCNC: 3.4 G/DL (ref 3.5–5.2)
ANION GAP SERPL CALCULATED.3IONS-SCNC: 9 MMOL/L (ref 5–15)
APTT PPP: 52.8 SECONDS (ref 61–76.5)
APTT PPP: 64.7 SECONDS (ref 61–76.5)
BUN SERPL-MCNC: 16 MG/DL (ref 8–23)
BUN/CREAT SERPL: 16 (ref 7–25)
CALCIUM SPEC-SCNC: 9.1 MG/DL (ref 8.6–10.5)
CHLORIDE SERPL-SCNC: 104 MMOL/L (ref 98–107)
CO2 SERPL-SCNC: 23 MMOL/L (ref 22–29)
CREAT SERPL-MCNC: 1 MG/DL (ref 0.57–1)
DEPRECATED RDW RBC AUTO: 53.8 FL (ref 37–54)
ERYTHROCYTE [DISTWIDTH] IN BLOOD BY AUTOMATED COUNT: 17.2 % (ref 12.3–15.4)
GFR SERPL CREATININE-BSD FRML MDRD: 67 ML/MIN/1.73
GLUCOSE BLDC GLUCOMTR-MCNC: 113 MG/DL (ref 70–105)
GLUCOSE BLDC GLUCOMTR-MCNC: 119 MG/DL (ref 70–105)
GLUCOSE SERPL-MCNC: 107 MG/DL (ref 65–99)
HCT VFR BLD AUTO: 36.2 % (ref 34–46.6)
HGB BLD-MCNC: 11.8 G/DL (ref 12–15.9)
INR PPP: 1.04 (ref 2–3)
MAGNESIUM SERPL-MCNC: 1.8 MG/DL (ref 1.6–2.4)
MCH RBC QN AUTO: 29.5 PG (ref 26.6–33)
MCHC RBC AUTO-ENTMCNC: 32.6 G/DL (ref 31.5–35.7)
MCV RBC AUTO: 90.6 FL (ref 79–97)
PHOSPHATE SERPL-MCNC: 3.1 MG/DL (ref 2.5–4.5)
PLATELET # BLD AUTO: 196 10*3/MM3 (ref 140–450)
PMV BLD AUTO: 10.2 FL (ref 6–12)
POTASSIUM SERPL-SCNC: 4 MMOL/L (ref 3.5–5.2)
PROTHROMBIN TIME: 11.4 SECONDS (ref 19.4–28.5)
RBC # BLD AUTO: 3.99 10*6/MM3 (ref 3.77–5.28)
SODIUM SERPL-SCNC: 136 MMOL/L (ref 136–145)
WBC # BLD AUTO: 8.8 10*3/MM3 (ref 3.4–10.8)

## 2021-03-02 PROCEDURE — 99238 HOSP IP/OBS DSCHRG MGMT 30/<: CPT | Performed by: HOSPITALIST

## 2021-03-02 PROCEDURE — 25010000002 ENOXAPARIN PER 10 MG: Performed by: HOSPITALIST

## 2021-03-02 PROCEDURE — 94799 UNLISTED PULMONARY SVC/PX: CPT

## 2021-03-02 PROCEDURE — 82962 GLUCOSE BLOOD TEST: CPT

## 2021-03-02 PROCEDURE — 83735 ASSAY OF MAGNESIUM: CPT | Performed by: INTERNAL MEDICINE

## 2021-03-02 PROCEDURE — 85610 PROTHROMBIN TIME: CPT | Performed by: HOSPITALIST

## 2021-03-02 PROCEDURE — 80069 RENAL FUNCTION PANEL: CPT | Performed by: INTERNAL MEDICINE

## 2021-03-02 PROCEDURE — 99232 SBSQ HOSP IP/OBS MODERATE 35: CPT | Performed by: NURSE PRACTITIONER

## 2021-03-02 PROCEDURE — 85730 THROMBOPLASTIN TIME PARTIAL: CPT | Performed by: INTERNAL MEDICINE

## 2021-03-02 PROCEDURE — 85027 COMPLETE CBC AUTOMATED: CPT | Performed by: INTERNAL MEDICINE

## 2021-03-02 RX ORDER — ALLOPURINOL 100 MG/1
100 TABLET ORAL DAILY
Qty: 30 TABLET | Refills: 0 | Status: SHIPPED | OUTPATIENT
Start: 2021-03-03 | End: 2021-04-22 | Stop reason: SDUPTHER

## 2021-03-02 RX ORDER — TORSEMIDE 20 MG/1
20 TABLET ORAL DAILY
Qty: 30 TABLET | Refills: 0 | Status: SHIPPED | OUTPATIENT
Start: 2021-03-03 | End: 2021-03-30 | Stop reason: SDUPTHER

## 2021-03-02 RX ORDER — WARFARIN SODIUM 5 MG/1
5 TABLET ORAL NIGHTLY
Qty: 30 TABLET | Refills: 0 | Status: SHIPPED | OUTPATIENT
Start: 2021-03-02 | End: 2021-04-02 | Stop reason: SDUPTHER

## 2021-03-02 RX ORDER — ATORVASTATIN CALCIUM 40 MG/1
40 TABLET, FILM COATED ORAL NIGHTLY
Qty: 30 TABLET | Refills: 0 | Status: SHIPPED | OUTPATIENT
Start: 2021-03-02 | End: 2021-03-30 | Stop reason: SDUPTHER

## 2021-03-02 RX ORDER — SPIRONOLACTONE 25 MG/1
25 TABLET ORAL DAILY
Qty: 30 TABLET | Refills: 0 | Status: SHIPPED | OUTPATIENT
Start: 2021-03-03 | End: 2021-03-30 | Stop reason: SDUPTHER

## 2021-03-02 RX ORDER — WARFARIN SODIUM 5 MG/1
5 TABLET ORAL
Status: DISCONTINUED | OUTPATIENT
Start: 2021-03-02 | End: 2021-03-02 | Stop reason: HOSPADM

## 2021-03-02 RX ORDER — ASPIRIN 81 MG/1
81 TABLET ORAL DAILY
Qty: 30 TABLET | Refills: 0 | Status: SHIPPED | OUTPATIENT
Start: 2021-03-02 | End: 2021-03-30 | Stop reason: SDUPTHER

## 2021-03-02 RX ADMIN — Medication 10 ML: at 08:48

## 2021-03-02 RX ADMIN — ALLOPURINOL 100 MG: 100 TABLET ORAL at 08:48

## 2021-03-02 RX ADMIN — BUDESONIDE AND FORMOTEROL FUMARATE DIHYDRATE 2 PUFF: 80; 4.5 AEROSOL RESPIRATORY (INHALATION) at 06:40

## 2021-03-02 RX ADMIN — POLYETHYLENE GLYCOL 3350 17 G: 17 POWDER, FOR SOLUTION ORAL at 08:48

## 2021-03-02 RX ADMIN — SPIRONOLACTONE 25 MG: 25 TABLET ORAL at 08:48

## 2021-03-02 RX ADMIN — METOPROLOL SUCCINATE 25 MG: 25 TABLET, EXTENDED RELEASE ORAL at 08:48

## 2021-03-02 RX ADMIN — ENOXAPARIN SODIUM 130 MG: 150 INJECTION SUBCUTANEOUS at 10:06

## 2021-03-02 RX ADMIN — ASPIRIN 325 MG: 325 TABLET, COATED ORAL at 08:48

## 2021-03-02 RX ADMIN — TORSEMIDE 20 MG: 10 TABLET ORAL at 08:48

## 2021-03-02 NOTE — PROGRESS NOTES
"Pharmacy dosing service  Anticoagulant  Warfarin     Subjective:    Jeanna Parks is a 70 y.o.female being initiated on warfarin for PE.    INR Goal: 2 - 3  Home medication?: No  Bridge Therapy Present?:  Yes,  Enoxaparin 130 mg SQ Q12H (Was on hep gtt but switched to Lovenox today so patient can discharge)   Interacting Medications Evaluation (New/Present/Discontinued): allopurinol, torsemide (both may increase INR)  Additional Contributing Factors: CHF exacerbation      Assessment/Plan:    New start warfarin for acute PE. Will continue initiation with warfarin 5 mg today but will dose cautiously given interacting medications and unsure of patient's sensitivity to warfarin. Lovenox 130 mg SQ q12h being given as bridge therapy. Recommend continuing bridge therapy until INR >2.      Continue to monitor and adjust based on INR. Daily INR ordered.         Date 3/1 3/2          INR 1.03 1.04          Dose 5 mg 5 mg              Objective:  [Ht: 160 cm (63\"); Wt: 126 kg (278 lb); BMI: Body mass index is 49.25 kg/m².]    Lab Results   Component Value Date    ALBUMIN 3.40 (L) 03/02/2021     Lab Results   Component Value Date    INR 1.04 (L) 03/02/2021    INR 1.03 (L) 03/01/2021    INR 1.09 02/25/2021    PROTIME 11.4 (L) 03/02/2021    PROTIME 11.3 (L) 03/01/2021    PROTIME 11.9 (H) 02/25/2021     Lab Results   Component Value Date    HGB 11.8 (L) 03/02/2021    HGB 11.9 (L) 03/01/2021    HGB 11.8 (L) 02/27/2021     Lab Results   Component Value Date    HCT 36.2 03/02/2021    HCT 35.7 03/01/2021    HCT 35.3 02/27/2021       Marie Leo RPH  03/02/21 10:23 EST       "

## 2021-03-02 NOTE — PLAN OF CARE
Goal Outcome Evaluation:     Progress: improving  Outcome Summary: No acute events this shift, patient still on Heparin gtt. Will monitor.

## 2021-03-02 NOTE — PROGRESS NOTES
"PULMONARY CRITICAL CARE Progress  NOTE      PATIENT IDENTIFICATION:  Name: Jeanna Brown  MRN: [id number]  :  1950     Age: 70 y.o.  Sex: female    DATE OF Note:  3/2/2021   Referring Physician: Hever Person DO                  Subjective:   Feeling better, sitting in chair, no SOB,  no chest pain, no nausea or vomiting, no change in bowel habit, no dysuria,  no new  skin rash or itching.      Objective:  tMax 24 hrs: Temp (24hrs), Av.5 °F (36.4 °C), Min:97.2 °F (36.2 °C), Max:98 °F (36.7 °C)      Vitals Ranges:   Temp:  [97.2 °F (36.2 °C)-98 °F (36.7 °C)] 97.2 °F (36.2 °C)  Heart Rate:  [83-98] 83  Resp:  [14-22] 22  BP: (102-166)/(56-93) 166/93    Intake and Output Last 3 Shifts:   I/O last 3 completed shifts:  In: 1200 [P.O.:1200]  Out: 1700 [Urine:1700]    Exam:  /93   Pulse 83   Temp 97.2 °F (36.2 °C) (Oral)   Resp 22   Ht 160 cm (63\")   Wt 126 kg (278 lb)   SpO2 97%   BMI 49.25 kg/m²     General Appearance: AAO    HEENT:  Normocephalic, without obvious abnormality, Conjunctiva/corneas clear,.  Normal external ear canals, Nares normal, no drainage     Neck:  Supple, symmetrical, trachea midline. No JVD.  Lungs /Chest wall:   Bilateral basal rhonchi, respirations unlabored, symmetrical wall movement.    Heart:  Regular rate and rhythm, systolic murmur PMI left sternal border  Abdomen: Soft, non-tender, no masses, no organomegaly.    Extremities: Trace edema no clubbing or Cyanosis        Medications:    Current Facility-Administered Medications:   •  acetaminophen (TYLENOL) tablet 650 mg, 650 mg, Oral, Q4H PRN **OR** acetaminophen (TYLENOL) 160 MG/5ML solution 650 mg, 650 mg, Oral, Q4H PRN **OR** acetaminophen (TYLENOL) suppository 650 mg, 650 mg, Rectal, Q4H PRN, Kit Jacques MD  •  albuterol sulfate HFA (PROVENTIL HFA;VENTOLIN HFA;PROAIR HFA) inhaler 2 puff, 2 puff, Inhalation, Q4H PRN, Kit Jacques MD  •  allopurinol (ZYLOPRIM) tablet " 100 mg, 100 mg, Oral, Daily, Kit Jacques MD, 100 mg at 03/02/21 0848  •  aluminum-magnesium hydroxide-simethicone (MAALOX MAX) 400-400-40 MG/5ML suspension 15 mL, 15 mL, Oral, Q6H PRN, Kit Jacques MD  •  aspirin EC tablet 325 mg, 325 mg, Oral, Daily, Kit Jacques MD, 325 mg at 03/02/21 0848  •  atropine sulfate injection 0.5-1 mg, 0.5-1 mg, Intravenous, Q5 Min PRN, Kit Jacques MD  •  budesonide-formoterol (SYMBICORT) 80-4.5 MCG/ACT inhaler 2 puff, 2 puff, Inhalation, BID - RT, Kit Jacques MD, 2 puff at 03/02/21 0640  •  dextrose (D50W) 25 g/ 50mL Intravenous Solution 25 g, 25 g, Intravenous, Q15 Min PRN, Kit Jacques MD  •  dextrose (GLUTOSE) oral gel 15 g, 15 g, Oral, Q15 Min PRN, Kit Jacques MD  •  diphenhydrAMINE (BENADRYL) capsule 25 mg, 25 mg, Oral, Q6H PRN, Kit Jacques MD  •  enoxaparin (LOVENOX) injection 130 mg, 1 mg/kg, Subcutaneous, Q12H, Hever Person DO, 130 mg at 03/02/21 1006  •  glucagon (human recombinant) (GLUCAGEN DIAGNOSTIC) injection 1 mg, 1 mg, Subcutaneous, PRN, Kit Jacques MD  •  insulin lispro (ADMELOG) injection 0-9 Units, 0-9 Units, Subcutaneous, TID AC, 2 Units at 02/23/21 0810 **AND** insulin lispro (ADMELOG) injection 0-9 Units, 0-9 Units, Subcutaneous, PRN, Kit Jacques MD  •  Magnesium Sulfate 2 gram infusion - Mg less than or equal to 1.5 mg/dL, 2 g, Intravenous, PRN **OR** Magnesium Sulfate 1 gram infusion - Mg 1.6-1.9 mg/dL, 1 g, Intravenous, PRN, Kit Jacques MD  •  metoprolol succinate XL (TOPROL-XL) 24 hr tablet 25 mg, 25 mg, Oral, Daily, Kit Jacques MD, 25 mg at 03/02/21 0848  •  nitroglycerin (NITROSTAT) SL tablet 0.4 mg, 0.4 mg, Sublingual, Q5 Min PRN, Kit Jacques MD  •  ondansetron (ZOFRAN) tablet 4 mg, 4 mg, Oral, Q6H PRN **OR** ondansetron (ZOFRAN) injection 4  mg, 4 mg, Intravenous, Q6H PRN, Kit Jacques MD, 4 mg at 02/23/21 1014  •  Pharmacy to dose warfarin, , Does not apply, Continuous PRN, Hever Person DO  •  polyethylene glycol (MIRALAX) packet 17 g, 17 g, Oral, Daily, Jose Lindsey DO, 17 g at 03/02/21 0848  •  potassium chloride (K-DUR,KLOR-CON) CR tablet 40 mEq, 40 mEq, Oral, PRN, Kit Jacques MD  •  [COMPLETED] Insert peripheral IV, , , Once **AND** sodium chloride 0.9 % flush 10 mL, 10 mL, Intravenous, PRN, Kit Jacques MD  •  sodium chloride 0.9 % flush 10 mL, 10 mL, Intravenous, Q12H, Kit Jacques MD, 10 mL at 03/02/21 0848  •  sodium chloride 0.9 % flush 10 mL, 10 mL, Intravenous, PRN, Kit Jacques MD  •  sodium chloride 0.9 % infusion 250 mL, 250 mL, Intravenous, Once PRN, Kit Jacques MD  •  spironolactone (ALDACTONE) tablet 25 mg, 25 mg, Oral, Daily, Kit Jacques MD, 25 mg at 03/02/21 0848  •  torsemide (DEMADEX) tablet 20 mg, 20 mg, Oral, Daily, Kit Jacques MD, 20 mg at 03/02/21 0848  •  warfarin (COUMADIN) tablet 5 mg, 5 mg, Oral, Daily **AND** [START ON 3/3/2021] Protime-INR, , , Daily, Hever Person DO    Data Review:  All labs (24hrs):   Recent Results (from the past 24 hour(s))   POC Glucose Once    Collection Time: 03/01/21  4:50 PM    Specimen: Blood   Result Value Ref Range    Glucose 150 (H) 70 - 105 mg/dL   aPTT    Collection Time: 03/01/21  6:13 PM    Specimen: Blood   Result Value Ref Range    PTT 91.4 (H) 61.0 - 76.5 seconds   POC Glucose Once    Collection Time: 03/01/21  8:32 PM    Specimen: Blood   Result Value Ref Range    Glucose 112 (H) 70 - 105 mg/dL   CBC (No Diff)    Collection Time: 03/02/21  1:38 AM    Specimen: Blood   Result Value Ref Range    WBC 8.80 3.40 - 10.80 10*3/mm3    RBC 3.99 3.77 - 5.28 10*6/mm3    Hemoglobin 11.8 (L) 12.0 - 15.9 g/dL    Hematocrit 36.2 34.0 - 46.6 %    MCV  90.6 79.0 - 97.0 fL    MCH 29.5 26.6 - 33.0 pg    MCHC 32.6 31.5 - 35.7 g/dL    RDW 17.2 (H) 12.3 - 15.4 %    RDW-SD 53.8 37.0 - 54.0 fl    MPV 10.2 6.0 - 12.0 fL    Platelets 196 140 - 450 10*3/mm3   Renal Function Panel    Collection Time: 03/02/21  1:38 AM    Specimen: Blood   Result Value Ref Range    Glucose 107 (H) 65 - 99 mg/dL    BUN 16 8 - 23 mg/dL    Creatinine 1.00 0.57 - 1.00 mg/dL    Sodium 136 136 - 145 mmol/L    Potassium 4.0 3.5 - 5.2 mmol/L    Chloride 104 98 - 107 mmol/L    CO2 23.0 22.0 - 29.0 mmol/L    Calcium 9.1 8.6 - 10.5 mg/dL    Albumin 3.40 (L) 3.50 - 5.20 g/dL    Phosphorus 3.1 2.5 - 4.5 mg/dL    Anion Gap 9.0 5.0 - 15.0 mmol/L    BUN/Creatinine Ratio 16.0 7.0 - 25.0    eGFR  African Amer 67 >60 mL/min/1.73   Magnesium    Collection Time: 03/02/21  1:38 AM    Specimen: Blood   Result Value Ref Range    Magnesium 1.8 1.6 - 2.4 mg/dL   Protime-INR    Collection Time: 03/02/21  1:38 AM    Specimen: Blood   Result Value Ref Range    Protime 11.4 (L) 19.4 - 28.5 Seconds    INR 1.04 (L) 2.00 - 3.00   aPTT    Collection Time: 03/02/21  1:38 AM    Specimen: Blood   Result Value Ref Range    PTT 64.7 61.0 - 76.5 seconds   POC Glucose Once    Collection Time: 03/02/21  7:18 AM    Specimen: Blood   Result Value Ref Range    Glucose 113 (H) 70 - 105 mg/dL   aPTT    Collection Time: 03/02/21  7:38 AM    Specimen: Blood   Result Value Ref Range    PTT 52.8 (L) 61.0 - 76.5 seconds   POC Glucose Once    Collection Time: 03/02/21 11:21 AM    Specimen: Blood   Result Value Ref Range    Glucose 119 (H) 70 - 105 mg/dL        Imaging:  Adult Transthoracic Echo Complete W/ Cont if Necessary Per Protocol  · Estimated left ventricular EF = 40% Left ventricular systolic function   is mildly decreased.  · Left ventricular diastolic dysfunction is noted.  · The right ventricular cavity is borderline dilated.  · Mildly reduced right ventricular systolic function noted.  · The right atrial cavity is mildly dilated.  ·  Severe tricuspid valve regurgitation is present.  · Estimated right ventricular systolic pressure from tricuspid   regurgitation is moderately elevated (45-55 mmHg).  · Moderate to severe pulmonary hypertension is present.  · Compared to the echocardiogram from December 2020 RV size is probably   mildly enlarged but tricuspid insufficiency severity unchanged pulmonary   pressures were actually mildly reduced          ASSESSMENT:    Gout    Obesity, morbid, BMI 50 or higher (CMS/HCC)    Essential hypertension    Type 2 diabetes mellitus without complication, without long-term current use of insulin (CMS/HCC)    Dilated cardiomyopathy (CMS/HCC)    Severe mitral regurgitation    Severe tricuspid regurgitation    Pulmonary hypertension (CMS/HCC)    Cor pulmonale (chronic) (CMS/HCC)    Acute pulmonary embolism with acute cor pulmonale (CMS/HCC)  STEVIE       PLAN:  May discharge from pulmonary standpoint and follow up in 2 weeks   Inhaled corticosteroids  Bowel regimen  Electrolytes/ glycemic control  GI prophylaxis  Needs OP work up for machine       Discussed with Dr Oren Yee, APRN  3/2/2021  13:49 EST    I personally have examined  and interviewed the patient. I have reviewed the history, data, problems, assessment and plan with our NP.  Critical care time in direct medical management (   ) minutes   Omer Lott MD, D,ABSM  3/2/2021

## 2021-03-02 NOTE — PROGRESS NOTES
[facility]   PROGRESS NOTE    Reason for follow-up: HFrEF (EF35-40%) per echo  Severe MR  Severe TR   Pulmonary hypertension   Hypertension   MODESTO/CKD  Acute PE     Patient Care Team:  Latanya Hawk MD as PCP - General (Family Medicine)  Kit Jacques MD as Consulting Physician (Cardiology)    Subjective .   Feels good and wants to go home today   No chest pain or shortness of breath - reports much improvement since admission      Review of Systems   Constitution: Positive for malaise/fatigue.   Cardiovascular: Positive for dyspnea on exertion and leg swelling. Negative for chest pain, irregular heartbeat, near-syncope and syncope.        Much improved since admission    Respiratory: Negative for cough and shortness of breath.    Gastrointestinal: Negative for abdominal pain, nausea and vomiting.   Neurological: Negative for excessive daytime sleepiness, dizziness, focal weakness and light-headedness.   All other systems reviewed and are negative.      Allergies:  Patient has no known allergies.    Scheduled Meds:allopurinol, 100 mg, Oral, Daily  aspirin, 325 mg, Oral, Daily  budesonide-formoterol, 2 puff, Inhalation, BID - RT  enoxaparin, 1 mg/kg, Subcutaneous, Q12H  insulin lispro, 0-9 Units, Subcutaneous, TID AC  metoprolol succinate XL, 25 mg, Oral, Daily  polyethylene glycol, 17 g, Oral, Daily  sodium chloride, 10 mL, Intravenous, Q12H  spironolactone, 25 mg, Oral, Daily  torsemide, 20 mg, Oral, Daily  warfarin, 5 mg, Oral, Daily      Continuous Infusions:Pharmacy to dose warfarin,       PRN Meds:.•  acetaminophen **OR** acetaminophen **OR** acetaminophen  •  albuterol sulfate HFA  •  aluminum-magnesium hydroxide-simethicone  •  atropine  •  dextrose  •  dextrose  •  diphenhydrAMINE  •  glucagon (human recombinant)  •  insulin lispro **AND** insulin lispro  •  magnesium sulfate **OR** magnesium sulfate in D5W 1g/100mL (PREMIX)  •  nitroglycerin  •   "ondansetron **OR** ondansetron  •  Pharmacy to dose warfarin  •  potassium chloride  •  [COMPLETED] Insert peripheral IV **AND** sodium chloride  •  sodium chloride  •  sodium chloride    Objective   Looks very comfortable sitting up in chair.  Just walked around room     VITAL SIGNS  Vitals:    03/02/21 0500 03/02/21 0538 03/02/21 0640 03/02/21 0642   BP:  115/65     BP Location:  Right arm     Patient Position:  Lying     Pulse:  88 89 93   Resp:  14 16 16   Temp:  97.6 °F (36.4 °C)     TempSrc:  Oral     SpO2:  96% 96%    Weight: 126 kg (278 lb)      Height:           Flowsheet Rows      First Filed Value   Admission Height  160 cm (63\") Documented at 02/19/2021 1154   Admission Weight  121 kg (266 lb) Documented at 02/19/2021 1154           TELEMETRY: sinus rhythm     Physical Exam:  Vitals signs and nursing note reviewed.   Constitutional:       Appearance: Healthy appearance. Not in distress. Obese.   Neck:      Vascular: No JVR. JVD normal.   Pulmonary:      Effort: Pulmonary effort is normal.      Breath sounds: Normal breath sounds. No wheezing. No rhonchi. No rales.   Chest:      Chest wall: Not tender to palpatation.   Cardiovascular:      PMI at left midclavicular line. Normal rate. Regular rhythm. Normal S1. Normal S2.      Murmurs: There is no murmur.      No gallop. No click. No rub.   Pulses:     Intact distal pulses.   Edema:     Peripheral edema absent.   Abdominal:      General: Bowel sounds are normal.      Palpations: Abdomen is soft.      Tenderness: There is no abdominal tenderness.   Musculoskeletal: Normal range of motion.         General: No tenderness.   Skin:     General: Skin is warm and dry.   Neurological:      General: No focal deficit present.      Mental Status: Alert and oriented to person, place and time.                  LAB RESULTS (LAST 7 DAYS)    CBC  Results from last 7 days   Lab Units 03/02/21  0138 03/01/21  0259 02/27/21  0141 02/26/21  0212 02/25/21 2005 02/25/21  0201 " 02/24/21 0148   WBC 10*3/mm3 8.80 8.10 9.70 9.00 9.80 9.90 8.40   RBC 10*6/mm3 3.99 4.02 4.05 4.04 4.26 4.30 4.03   HEMOGLOBIN g/dL 11.8* 11.9* 11.8* 11.9* 12.6 12.3 11.8*   HEMATOCRIT % 36.2 35.7 35.3 36.0 38.0 38.7 35.8   MCV fL 90.6 88.9 87.1 89.2 89.2 90.1 88.8   PLATELETS 10*3/mm3 196 213 196 221 247 230 226       BMP  Results from last 7 days   Lab Units 03/02/21 0138 03/01/21 0259 02/28/21 0138 02/27/21 0141 02/26/21 0212 02/25/21 0201 02/24/21 0148   SODIUM mmol/L 136 138 138 136 139 142 138   POTASSIUM mmol/L 4.0 4.0 4.1 3.6 3.7 4.1 4.1   CHLORIDE mmol/L 104 103 104 103 105 105 103   CO2 mmol/L 23.0 24.0 21.0* 22.0 23.0 23.0 19.0*   BUN mg/dL 16 14 19 27* 40* 51* 53*   CREATININE mg/dL 1.00 1.08* 1.14* 1.13* 1.31* 1.73* 1.84*   GLUCOSE mg/dL 107* 114* 96 90 97 100* 113*   MAGNESIUM mg/dL 1.8 1.9 2.0 2.0 2.1 2.4 2.3   PHOSPHORUS mg/dL 3.1  --   --  2.7 3.1 3.4 3.1       CMP   Results from last 7 days   Lab Units 03/02/21  0138 03/01/21  0259 02/28/21 0138 02/27/21 0141 02/26/21 0212 02/25/21 0201 02/24/21 0148   SODIUM mmol/L 136 138 138 136 139 142 138   POTASSIUM mmol/L 4.0 4.0 4.1 3.6 3.7 4.1 4.1   CHLORIDE mmol/L 104 103 104 103 105 105 103   CO2 mmol/L 23.0 24.0 21.0* 22.0 23.0 23.0 19.0*   BUN mg/dL 16 14 19 27* 40* 51* 53*   CREATININE mg/dL 1.00 1.08* 1.14* 1.13* 1.31* 1.73* 1.84*   GLUCOSE mg/dL 107* 114* 96 90 97 100* 113*   ALBUMIN g/dL 3.40*  --   --  3.40* 3.40* 3.40* 3.50         BNP        TROPONIN        CoAg  Results from last 7 days   Lab Units 03/02/21  0738 03/02/21  0138 03/01/21  1813 03/01/21  1033 03/01/21  0259 02/28/21  2350 02/28/21  1622  02/25/21 2005   INR   --  1.04*  --  1.03*  --   --   --   --  1.09   APTT seconds 52.8* 64.7 91.4* 56.7* 65.4 103.6* 55.5*   < > 28.6*    < > = values in this interval not displayed.       Creatinine Clearance  Estimated Creatinine Clearance: 68.6 mL/min (by C-G formula based on SCr of 1 mg/dL).    ABG        Radiology  No  radiology results for the last day          EKG        I personally viewed and interpreted the patient's EKG/Telemetry data:    ECHOCARDIOGRAM:    Results for orders placed during the hospital encounter of 02/19/21   Adult Transthoracic Echo Complete W/ Cont if Necessary Per Protocol    Narrative · Estimated left ventricular EF = 40% Left ventricular systolic function   is mildly decreased.  · Left ventricular diastolic dysfunction is noted.  · The right ventricular cavity is borderline dilated.  · Mildly reduced right ventricular systolic function noted.  · The right atrial cavity is mildly dilated.  · Severe tricuspid valve regurgitation is present.  · Estimated right ventricular systolic pressure from tricuspid   regurgitation is moderately elevated (45-55 mmHg).  · Moderate to severe pulmonary hypertension is present.  · Compared to the echocardiogram from December 2020 RV size is probably   mildly enlarged but tricuspid insufficiency severity unchanged pulmonary   pressures were actually mildly reduced        STRESS MYOVIEW:    CARDIAC CATHETERIZATION:    OTHER:         Assessment/Plan       Acute on chronic combined systolic and diastolic CHF (congestive heart failure) (CMS/[facility])    Gout    Obesity, morbid, BMI 50 or higher (CMS/[facility])    Essential hypertension    Type 2 diabetes mellitus without complication, without long-term current use of insulin (CMS/[facility])    Dilated cardiomyopathy (CMS/[facility])    MODESTO (acute kidney injury) (CMS/[facility])    Severe mitral regurgitation    Severe tricuspid regurgitation    Pulmonary hypertension (CMS/[facility])    Cor pulmonale (chronic) (CMS/[facility])    Acute pulmonary embolism with acute cor pulmonale (CMS/[facility])    PLAN:  Patient presented with acute/chronic HFrEF   Patient started on diuretics and diuresed well --- nephrology following   Cr back to 1.00 today  Continue current torsemide and aldactone   Continue beta blocker,  Will need to discuss adding ACE-I if renal function continues to  stay normal on discharge   Patient had cardiac catheterization that showed Her right heart cath showed severe pulmonary hypertension elevated right atrial pressure but LVEDP is normal. Coronaries showed severe LAD disease  Transesophageal echocardiogram showed severe TR at least moderate if not moderate to severe MR at that time blood pressure systolic was only 90 mmHg  Dr. Gonsalves discussed with CV surgery and interventional cardiology  If patient is a candidate she can undergo mitral and tricuspid valve repair and OSBORNE to LAD  Or just PCI to the LAD and aggressive medical treatment  -- medical treatment for now        Patient was seen by pulmonary--- PE heparin was started which is now off   Patient switched to Lovenox and coumadin   INR 1.04--- monitor INR closely (gave option to monitor at our office or with PCP)       Okay to discharge home with close follow up in 2 weeks   Monitor INR and renal function closely       I discussed the patients findings and my recommendations with patient and bedside RN     RAJESH Olson  03/02/21  11:34 EST   Electronically signed by RAJESH Olson, 03/02/21, 11:46 AM EST.

## 2021-03-02 NOTE — PROGRESS NOTES
Continued Stay Note  MANNY Rodriguez     Patient Name: Jeanna Parks  MRN: [id number]  Today's Date: 3/2/2021    Admit Date: 2/19/2021    Discharge Plan     Row Name 03/02/21 1137       Plan    Plan  D/C Plan : Home with family . Meds to beds for new perscriptions .(lovenox , coumadin )  Pt to F/U with cardiology for lab work , Inr's . Pt deneis needof H/H . .        Discharge Codes    No documentation.             Virginia Barron RN

## 2021-03-02 NOTE — PROGRESS NOTES
"PULMONARY CRITICAL CARE Progress  NOTE      PATIENT IDENTIFICATION:  Name: Jeanna Brown  MRN: [id number]  :  1950     Age: 70 y.o.  Sex: female    DATE OF Note:  3/2/2021   Referring Physician: Hever Person DO                  Subjective:   Feeling better, sitting up in room, no SOB,  no chest pain, no nausea or vomiting, no change in bowel habit, no dysuria,  no new  skin rash or itching.      Objective:  tMax 24 hrs: Temp (24hrs), Av.5 °F (36.4 °C), Min:97.2 °F (36.2 °C), Max:98 °F (36.7 °C)      Vitals Ranges:   Temp:  [97.2 °F (36.2 °C)-98 °F (36.7 °C)] 97.2 °F (36.2 °C)  Heart Rate:  [83-98] 83  Resp:  [14-22] 22  BP: (102-166)/(56-93) 166/93    Intake and Output Last 3 Shifts:   I/O last 3 completed shifts:  In: 1200 [P.O.:1200]  Out: 1700 [Urine:1700]    Exam:  /93   Pulse 83   Temp 97.2 °F (36.2 °C) (Oral)   Resp 22   Ht 160 cm (63\")   Wt 126 kg (278 lb)   SpO2 97%   BMI 49.25 kg/m²     General Appearance: AAO    HEENT:  Normocephalic, without obvious abnormality, Conjunctiva/corneas clear,.  Normal external ear canals, Nares normal, no drainage     Neck:  Supple, symmetrical, trachea midline. No JVD.  Lungs /Chest wall:   Bilateral basal rhonchi, respirations unlabored, symmetrical wall movement.    Heart:  Regular rate and rhythm, systolic murmur PMI left sternal border  Abdomen: Soft, non-tender, no masses, no organomegaly.    Extremities: Trace edema no clubbing or Cyanosis        Medications:    Current Facility-Administered Medications:   •  acetaminophen (TYLENOL) tablet 650 mg, 650 mg, Oral, Q4H PRN **OR** acetaminophen (TYLENOL) 160 MG/5ML solution 650 mg, 650 mg, Oral, Q4H PRN **OR** acetaminophen (TYLENOL) suppository 650 mg, 650 mg, Rectal, Q4H PRN, Kit Jacques MD  •  albuterol sulfate HFA (PROVENTIL HFA;VENTOLIN HFA;PROAIR HFA) inhaler 2 puff, 2 puff, Inhalation, Q4H PRN, Kit Jacques MD  •  allopurinol (ZYLOPRIM) " tablet 100 mg, 100 mg, Oral, Daily, Kit Jacques MD, 100 mg at 03/02/21 0848  •  aluminum-magnesium hydroxide-simethicone (MAALOX MAX) 400-400-40 MG/5ML suspension 15 mL, 15 mL, Oral, Q6H PRN, Kit Jacques MD  •  aspirin EC tablet 325 mg, 325 mg, Oral, Daily, Kit Jacques MD, 325 mg at 03/02/21 0848  •  atropine sulfate injection 0.5-1 mg, 0.5-1 mg, Intravenous, Q5 Min PRN, Kit Jacques MD  •  budesonide-formoterol (SYMBICORT) 80-4.5 MCG/ACT inhaler 2 puff, 2 puff, Inhalation, BID - RT, Kit Jacques MD, 2 puff at 03/02/21 0640  •  dextrose (D50W) 25 g/ 50mL Intravenous Solution 25 g, 25 g, Intravenous, Q15 Min PRN, Kit Jacques MD  •  dextrose (GLUTOSE) oral gel 15 g, 15 g, Oral, Q15 Min PRN, Kit Jacques MD  •  diphenhydrAMINE (BENADRYL) capsule 25 mg, 25 mg, Oral, Q6H PRN, Kit Jacques MD  •  enoxaparin (LOVENOX) injection 130 mg, 1 mg/kg, Subcutaneous, Q12H, Hever Person DO, 130 mg at 03/02/21 1006  •  glucagon (human recombinant) (GLUCAGEN DIAGNOSTIC) injection 1 mg, 1 mg, Subcutaneous, PRN, Kit Jacques MD  •  insulin lispro (ADMELOG) injection 0-9 Units, 0-9 Units, Subcutaneous, TID AC, 2 Units at 02/23/21 0810 **AND** insulin lispro (ADMELOG) injection 0-9 Units, 0-9 Units, Subcutaneous, PRN, Kit Jacques MD  •  Magnesium Sulfate 2 gram infusion - Mg less than or equal to 1.5 mg/dL, 2 g, Intravenous, PRN **OR** Magnesium Sulfate 1 gram infusion - Mg 1.6-1.9 mg/dL, 1 g, Intravenous, PRN, Kit Jacques MD  •  metoprolol succinate XL (TOPROL-XL) 24 hr tablet 25 mg, 25 mg, Oral, Daily, Kit Jacques MD, 25 mg at 03/02/21 0848  •  nitroglycerin (NITROSTAT) SL tablet 0.4 mg, 0.4 mg, Sublingual, Q5 Min PRN, Kit Jacques MD  •  ondansetron (ZOFRAN) tablet 4 mg, 4 mg, Oral, Q6H PRN **OR** ondansetron (ZOFRAN)  injection 4 mg, 4 mg, Intravenous, Q6H PRN, Kit Jacques MD, 4 mg at 02/23/21 1014  •  Pharmacy to dose warfarin, , Does not apply, Continuous PRN, Hever Person DO  •  polyethylene glycol (MIRALAX) packet 17 g, 17 g, Oral, Daily, Jose Lindsey, , 17 g at 03/02/21 0848  •  potassium chloride (K-DUR,KLOR-CON) CR tablet 40 mEq, 40 mEq, Oral, PRN, Kit Jacques MD  •  [COMPLETED] Insert peripheral IV, , , Once **AND** sodium chloride 0.9 % flush 10 mL, 10 mL, Intravenous, PRN, Kit Jacques MD  •  sodium chloride 0.9 % flush 10 mL, 10 mL, Intravenous, Q12H, Kit Jacques MD, 10 mL at 03/02/21 0848  •  sodium chloride 0.9 % flush 10 mL, 10 mL, Intravenous, PRN, Kit Jacques MD  •  sodium chloride 0.9 % infusion 250 mL, 250 mL, Intravenous, Once PRN, Kit Jacques MD  •  spironolactone (ALDACTONE) tablet 25 mg, 25 mg, Oral, Daily, Kit Jacques MD, 25 mg at 03/02/21 0848  •  torsemide (DEMADEX) tablet 20 mg, 20 mg, Oral, Daily, Kit Jacques MD, 20 mg at 03/02/21 0848  •  warfarin (COUMADIN) tablet 5 mg, 5 mg, Oral, Daily **AND** [START ON 3/3/2021] Protime-INR, , , Daily, Hever Person DO    Data Review:  All labs (24hrs):   Recent Results (from the past 24 hour(s))   POC Glucose Once    Collection Time: 03/01/21  4:50 PM    Specimen: Blood   Result Value Ref Range    Glucose 150 (H) 70 - 105 mg/dL   aPTT    Collection Time: 03/01/21  6:13 PM    Specimen: Blood   Result Value Ref Range    PTT 91.4 (H) 61.0 - 76.5 seconds   POC Glucose Once    Collection Time: 03/01/21  8:32 PM    Specimen: Blood   Result Value Ref Range    Glucose 112 (H) 70 - 105 mg/dL   CBC (No Diff)    Collection Time: 03/02/21  1:38 AM    Specimen: Blood   Result Value Ref Range    WBC 8.80 3.40 - 10.80 10*3/mm3    RBC 3.99 3.77 - 5.28 10*6/mm3    Hemoglobin 11.8 (L) 12.0 - 15.9 g/dL    Hematocrit 36.2 34.0 - 46.6  %    MCV 90.6 79.0 - 97.0 fL    MCH 29.5 26.6 - 33.0 pg    MCHC 32.6 31.5 - 35.7 g/dL    RDW 17.2 (H) 12.3 - 15.4 %    RDW-SD 53.8 37.0 - 54.0 fl    MPV 10.2 6.0 - 12.0 fL    Platelets 196 140 - 450 10*3/mm3   Renal Function Panel    Collection Time: 03/02/21  1:38 AM    Specimen: Blood   Result Value Ref Range    Glucose 107 (H) 65 - 99 mg/dL    BUN 16 8 - 23 mg/dL    Creatinine 1.00 0.57 - 1.00 mg/dL    Sodium 136 136 - 145 mmol/L    Potassium 4.0 3.5 - 5.2 mmol/L    Chloride 104 98 - 107 mmol/L    CO2 23.0 22.0 - 29.0 mmol/L    Calcium 9.1 8.6 - 10.5 mg/dL    Albumin 3.40 (L) 3.50 - 5.20 g/dL    Phosphorus 3.1 2.5 - 4.5 mg/dL    Anion Gap 9.0 5.0 - 15.0 mmol/L    BUN/Creatinine Ratio 16.0 7.0 - 25.0    eGFR  African Amer 67 >60 mL/min/1.73   Magnesium    Collection Time: 03/02/21  1:38 AM    Specimen: Blood   Result Value Ref Range    Magnesium 1.8 1.6 - 2.4 mg/dL   Protime-INR    Collection Time: 03/02/21  1:38 AM    Specimen: Blood   Result Value Ref Range    Protime 11.4 (L) 19.4 - 28.5 Seconds    INR 1.04 (L) 2.00 - 3.00   aPTT    Collection Time: 03/02/21  1:38 AM    Specimen: Blood   Result Value Ref Range    PTT 64.7 61.0 - 76.5 seconds   POC Glucose Once    Collection Time: 03/02/21  7:18 AM    Specimen: Blood   Result Value Ref Range    Glucose 113 (H) 70 - 105 mg/dL   aPTT    Collection Time: 03/02/21  7:38 AM    Specimen: Blood   Result Value Ref Range    PTT 52.8 (L) 61.0 - 76.5 seconds   POC Glucose Once    Collection Time: 03/02/21 11:21 AM    Specimen: Blood   Result Value Ref Range    Glucose 119 (H) 70 - 105 mg/dL        Imaging:  Adult Transthoracic Echo Complete W/ Cont if Necessary Per Protocol  · Estimated left ventricular EF = 40% Left ventricular systolic function   is mildly decreased.  · Left ventricular diastolic dysfunction is noted.  · The right ventricular cavity is borderline dilated.  · Mildly reduced right ventricular systolic function noted.  · The right atrial cavity is mildly  dilated.  · Severe tricuspid valve regurgitation is present.  · Estimated right ventricular systolic pressure from tricuspid   regurgitation is moderately elevated (45-55 mmHg).  · Moderate to severe pulmonary hypertension is present.  · Compared to the echocardiogram from December 2020 RV size is probably   mildly enlarged but tricuspid insufficiency severity unchanged pulmonary   pressures were actually mildly reduced          ASSESSMENT:    Acute on chronic combined systolic and diastolic CHF (congestive heart failure) (CMS/[facility])    Gout    Obesity, morbid, BMI 50 or higher (CMS/[facility])    Essential hypertension    Type 2 diabetes mellitus without complication, without long-term current use of insulin (CMS/[facility])    Dilated cardiomyopathy (CMS/[facility])    MODESTO (acute kidney injury) (CMS/[facility])    Severe mitral regurgitation    Severe tricuspid regurgitation    Pulmonary hypertension (CMS/[facility])    Cor pulmonale (chronic) (CMS/[facility])    Acute pulmonary embolism with acute cor pulmonale (CMS/H[facility])  STEVIE       PLAN:  Heparin IV   Inhaled corticosteroids  Bowel regimen  Electrolytes/ glycemic control  GI prophylaxis  Needs OP work up for machine       Discussed with Dr Oren Yee, APRN  3/2/2021  13:22 EST    I personally have examined  and interviewed the patient. I have reviewed the history, data, problems, assessment and plan with our NP.  Critical care time in direct medical management (   ) minutes   Omer Lott MD, D,ABSM  1/23/2021

## 2021-03-02 NOTE — DISCHARGE SUMMARY
[facility] Medicine Services  DISCHARGE SUMMARY        Prepared For PCP:  Latanya Hawk MD    Patient Name: Jeanna Parks  : 1950  MRN: [id number]      Date of Admission:   2021    Date of Discharge:  3/2/2021    Length of stay:  LOS: 11 days     Hospital Course     Presenting Problem:   Dyspnea, unspecified type [R06.00]  Acute on chronic congestive heart failure, unspecified heart failure type (CMS/HCC) [I50.9]  Dyspnea, unspecified type [R06.00]      Active Hospital Problems    Diagnosis  POA   • Acute pulmonary embolism with acute cor pulmonale (CMS/[facility]) [I26.09]  Yes     Priority: High   • Severe mitral regurgitation [I34.0]  Yes     Priority: High   • Severe tricuspid regurgitation [I07.1]  Yes     Priority: High   • Pulmonary hypertension (CMS/[facility]) [I27.20]  Yes     Priority: Medium   • Cor pulmonale (chronic) (CMS/[facility]) [I27.81]  Yes     Priority: Medium   • Type 2 diabetes mellitus without complication, without long-term current use of insulin (CMS/[facility]) [E11.9]  Yes     Priority: Medium   • Dilated cardiomyopathy (CMS/[facility]) [I42.0]  Yes     Priority: Medium   • Essential hypertension [I10]  Yes     Priority: Medium   • Obesity, morbid, BMI 50 or higher (CMS/[facility]) [E66.01]  Yes     Priority: Medium   • Gout [M10.9]  Yes     Priority: Medium      Resolved Hospital Problems    Diagnosis Date Resolved POA   • **Acute on chronic combined systolic and diastolic CHF (congestive heart failure) (CMS/[facility]) [I50.43] 2021 Yes     Priority: High   • MODESTO (acute kidney injury) (CMS/[facility]) [N17.9] 2021 Yes     Priority: High           Hospital Course:    The patient was admitted to the medical floor and was seen by cardiologist.  VQ scan on 2021 was suggestive of pulmonary embolism therefore was started on anticoagulation.  Nephrology was consulted for MODESTO.  The patient underwent left and right heart catheterization on 2021 which showed severe  proximal LAD disease, severe pulmonary hypertension, severe TR and severe MR with normal LVEDP.  Cardiaothoracic surgeon was consulted regarding CAD and VHD.  He recommended pulmonology and nephrology consult.  Renal function improved and the patient underwent CTA of chest on 2/25/2021 which showed PE in the distal right pulmonary artery extending in to all lobes and segmental and subsegmental left upper and lower lobe pulmonary artery.  The patient will be discharged on Lovenox bridge and Coumadin and will follow up with Dr. Gonsalves, cardiologist in 2 weeks for further management.  Nephrologist has adjusted diuretic regimen and losartan was held on discharge because of recent MODESTO.        Problem list addressed during hospitalization    Acute on chronic HFrEF likely d/t MR, TR and pulmonary hypertension  -s/p LHC/RHC 2/22/21: Severe proximal LAD disease, severe pulmonary HTN, severe tricuspid, severe mitral regurgitation and normal  LVEDP.  -cardiology consulted  -Patient evaluated by CV surgery-->will follow up in the office  -on toprol XL, aspirin and statin  -Follow-up with cardiologist, Dr. Gonsalves in 2 weeks     Acute Pulmonary embolism  -V/Q 2/19/21--> PE  -CT PE 2/25/2021 -->Pulmonary emboli in the distal right pulmonary artery extending into all lobes. also seen in the segmental and subsegmental left upper and lower lobe pulmonary arteries  -TTE 2/26/2021: Minimal RV dilation and improving pulmonary hypertension  -cannot afford Eliquis  -Discharged on Lovenox bridge and Coumadin     CAD  -s/p LHC 2/22/2021--> severe proximal LAD disease  -Follow-up with cardiologist, Dr. Gonsalves in 2 weeks  -Discharged on aspirin, Toprol and statin  -No ACE or ARB because of recent MODESTO    Valvular heart disease with severe MR and TR:  -Evaluated by CV surgery  -Follow-up in the office for further management      Severe pulmonary hypertension  -Possibly due to COPD     MODESTO on CKD III: Resolved  -nephrology following  -Discharged home   torsemide and aldactone per nephrology  -Losartan discontinued     Diabetes mellitus  -Discharge on Metformin      Hypertension:  -Continue Toprol and Aldactone        Recommendation for Outpatient Providers:             Reasons For Change In Medications and Indications for New Medications:        Day of Discharge     HPI:       The patient is a 70-year-old female with history of hypertension, DM, dilated cardiomyopathy, COPD and obesity that presented to the emergency room on 2/19/2021 complaining of 2 weeks history of dyspnea and ABAD.  Apparently the patient had seen her PCP and was told to go to the emergency room.          Vital Signs:   Temp:  [97.2 °F (36.2 °C)-98 °F (36.7 °C)] 97.2 °F (36.2 °C)  Heart Rate:  [83-98] 83  Resp:  [14-22] 22  BP: (102-166)/(56-93) 166/93     Physical Exam:  Physical Exam  HENT:      Head: Normocephalic.      Nose: Nose normal.   Eyes:      General: No scleral icterus.     Extraocular Movements: Extraocular movements intact.      Pupils: Pupils are equal, round, and reactive to light.   Neck:      Musculoskeletal: Normal range of motion.   Cardiovascular:      Rate and Rhythm: Normal rate and regular rhythm.   Pulmonary:      Effort: Pulmonary effort is normal.      Breath sounds: Normal breath sounds.   Abdominal:      General: Bowel sounds are normal.      Palpations: Abdomen is soft.   Musculoskeletal: Normal range of motion.   Skin:     General: Skin is warm.   Neurological:      General: No focal deficit present.      Mental Status: She is alert and oriented to person, place, and time.   Psychiatric:         Mood and Affect: Mood normal.         Pertinent  and/or Most Recent Results     Results from last 7 days   Lab Units 03/02/21  0138 03/01/21  0259 02/28/21  0138 02/27/21  0141 02/26/21  0212 02/25/21 2005 02/25/21  0201 02/24/21  0148   WBC 10*3/mm3 8.80 8.10  --  9.70 9.00 9.80 9.90 8.40   HEMOGLOBIN g/dL 11.8* 11.9*  --  11.8* 11.9* 12.6 12.3 11.8*   HEMATOCRIT % 36.2  35.7  --  35.3 36.0 38.0 38.7 35.8   PLATELETS 10*3/mm3 196 213  --  196 221 247 230 226   SODIUM mmol/L 136 138 138 136 139  --  142 138   POTASSIUM mmol/L 4.0 4.0 4.1 3.6 3.7  --  4.1 4.1   CHLORIDE mmol/L 104 103 104 103 105  --  105 103   CO2 mmol/L 23.0 24.0 21.0* 22.0 23.0  --  23.0 19.0*   BUN mg/dL 16 14 19 27* 40*  --  51* 53*   CREATININE mg/dL 1.00 1.08* 1.14* 1.13* 1.31*  --  1.73* 1.84*   GLUCOSE mg/dL 107* 114* 96 90 97  --  100* 113*   CALCIUM mg/dL 9.1 9.4 9.8 9.1 9.2  --  9.6 9.5     Results from last 7 days   Lab Units 03/02/21  0738 03/02/21  0138 03/01/21  1813 03/01/21  1033 03/01/21  0259 02/28/21  2350 02/28/21  1622  02/25/21 2005   PROTIME Seconds  --  11.4*  --  11.3*  --   --   --   --  11.9*   INR   --  1.04*  --  1.03*  --   --   --   --  1.09   APTT seconds 52.8* 64.7 91.4* 56.7* 65.4 103.6* 55.5*   < > 28.6*    < > = values in this interval not displayed.           Invalid input(s): TG, LDLCALC, LDLREALC        Brief Urine Lab Results  (Last result in the past 365 days)      Color   Clarity   Blood   Leuk Est   Nitrite   Protein   CREAT   Urine HCG        02/20/21 1530 Yellow Cloudy  Comment:  Result checked  Negative Negative Negative Negative               Microbiology Results Abnormal     Procedure Component Value - Date/Time    COVID-19,[facility], NP/OP SWAB IN UTM/VTM/[facility] TRANSPORT MEDIA,24 HR TAT - Swab, Nasopharynx [[id number]]  (Normal) Collected: 02/19/21 1511    Lab Status: Final result Specimen: Swab from Nasopharynx Updated: 02/19/21 2344     COVID19 Not Detected    Narrative:      Fact sheet for providers: https://www.fda.gov/media/853591/download     Fact sheet for patients: https://www.fda.gov/media/483582/download    Test performed by RT PCR.          Nm Lung Ventilation Perfusion Aerosol    Addendum Date: 2/25/2021    This case was reviewed at the request of the referring clinician. There are multiple moderate sized mismatch ventilation perfusion  defects. This is best demonstrated within the left lower lobe and the right upper lobe. Review of the chest x-ray from 02/19/2021 shows no underlying pulmonary abnormality. When utilizing the revised PIOPED criteria, the patient has a high probability of acute pulmonary embolic disease. The findings were discussed with Dr. César D.O. by telephone.  Electronically Signed By-Héctor Yadav MD On:2/25/2021 10:44 AM This report was finalized on [id number] by  Héctor Yadav MD.    Result Date: 2/25/2021  Impression: Questionable subtle decreased perfusion present within the right upper lobe though perfusion is still present. This appears slightly asymmetric as compared to the ventilation images. The ventilation/perfusion appears heterogeneous which may be related to intrinsic lung disease. Findings consistent with intermediate probability of pulmonary embolism. CTPA evaluation may be warranted.  Electronically Signed By-Veronica Mg MD On:2/20/2021 9:21 AM This report was finalized on [id number] by  Veronica Mg MD.    Xr Chest 1 View    Result Date: 2/19/2021  Impression: No acute cardiopulmonary process. Stable mild cardiomegaly.  Electronically Signed By-Veronica Mg MD On:2/19/2021 12:44 PM This report was finalized on [id number] by  Veronica Mg MD.    Ct Chest Pulmonary Embolism    Result Date: 2/25/2021  Impression: 1.Pulmonary emboli in the distal right pulmonary artery extending into all lobes. Pulmonary emboli are also seen in the segmental and subsegmental left upper and lower lobe pulmonary arteries. 2.Enlargement of the right atrium and reflux of contrast into the IVC and hepatic veins may indicate right heart strain. Enlargement of the pulmonary artery, as before, suggests pulmonary artery hypertension. 3.Nonspecific splenomegaly.  Findings were called to Dr. Muniz by Dr. Healy 2/25/2021 7:14 PM  Electronically Signed By-Rafa Healy MD On:2/25/2021 7:15 PM This report was finalized on  [id number] by  Rafa Healy MD.                Results for orders placed during the hospital encounter of 02/19/21   Adult Transthoracic Echo Complete W/ Cont if Necessary Per Protocol    Narrative · Estimated left ventricular EF = 40% Left ventricular systolic function   is mildly decreased.  · Left ventricular diastolic dysfunction is noted.  · The right ventricular cavity is borderline dilated.  · Mildly reduced right ventricular systolic function noted.  · The right atrial cavity is mildly dilated.  · Severe tricuspid valve regurgitation is present.  · Estimated right ventricular systolic pressure from tricuspid   regurgitation is moderately elevated (45-55 mmHg).  · Moderate to severe pulmonary hypertension is present.  · Compared to the echocardiogram from December 2020 RV size is probably   mildly enlarged but tricuspid insufficiency severity unchanged pulmonary   pressures were actually mildly reduced                  Test Results Pending at Discharge        Procedures Performed  Procedure(s):  Cardiac Catheterization/Vascular Study         Consults:   Consults     Date and Time Order Name Status Description    2/25/2021 1551 Inpatient Pulmonology Consult Completed     2/23/2021 1529 Inpatient Cardiothoracic Surgery Consult Completed     2/20/2021 1213 Inpatient Nephrology Consult      2/19/2021 1918 Inpatient Cardiology Consult Completed     2/19/2021 1321 Hospitalist (on-call MD unless specified) Completed             Discharge Details        Discharge Medications      New Medications      Instructions Start Date   allopurinol 100 MG tablet  Commonly known as: ZYLOPRIM   100 mg, Oral, Daily   Start Date: March 3, 2021     aspirin 81 MG EC tablet   81 mg, Oral, Daily      atorvastatin 40 MG tablet  Commonly known as: Lipitor   40 mg, Oral, Nightly      enoxaparin 150 MG/ML injection  Commonly known as: LOVENOX   1 mg/kg (130 mg), Subcutaneous, Every 12 Hours      spironolactone 25 MG tablet  Commonly  known as: ALDACTONE   25 mg, Oral, Daily   Start Date: March 3, 2021     torsemide 20 MG tablet  Commonly known as: DEMADEX   20 mg, Oral, Daily   Start Date: March 3, 2021     warfarin 5 MG tablet  Commonly known as: COUMADIN   5 mg, Oral, Nightly, PE         Continue These Medications      Instructions Start Date   albuterol sulfate  (90 Base) MCG/ACT inhaler  Commonly known as: PROVENTIL HFA;VENTOLIN HFA;PROAIR HFA   2 puffs, Inhalation, Every 4 Hours PRN      budesonide-formoterol 80-4.5 MCG/ACT inhaler  Commonly known as: Symbicort   2 puffs, Inhalation, 2 Times Daily - RT      metFORMIN 500 MG tablet  Commonly known as: GLUCOPHAGE   TAKE 1 TABLET BY MOUTH TWO  TIMES DAILY WITH MEALS      metoprolol succinate XL 25 MG 24 hr tablet  Commonly known as: TOPROL-XL   25 mg, Oral, Daily         Stop These Medications    bumetanide 2 MG tablet  Commonly known as: BUMEX     losartan 25 MG tablet  Commonly known as: COZAAR            No Known Allergies      Discharge Disposition:  Home or Self Care    Diet:  Hospital:  Diet Order   Procedures   • Diet Cardiac, Diabetic/Consistent Carbs; Healthy Heart; Diabetic - Consistent Carb         Discharge Activity: as tolerated        CODE STATUS:    Code Status and Medical Interventions:   Ordered at: 02/19/21 1359     Level Of Support Discussed With:    Patient     Code Status:    CPR     Medical Interventions (Level of Support Prior to Arrest):    Full         Follow-up Appointments  Future Appointments   Date Time Provider Department Center   3/17/2021 11:00 AM Maria Luz Shah APRN [facility]   3/19/2021 11:20 AM Kit Jacques MD [facility]   5/3/2021 12:00 PM Palmira Trujillo MD [facility] None       Additional Instructions for the Follow-ups that You Need to Schedule     Discharge Follow-up with PCP   As directed       Currently Documented PCP:    Latanya Hawk MD    PCP Phone Number:    [phone]     Follow Up  Details: 2 weeks         Discharge Follow-up with Specified Provider: Dr. Gonsalves Cardiologist; 2 Weeks   As directed      To: Dr. Gonsalves Cardiologist    Follow Up: 2 Weeks         Protime-INR   Mar 05, 2021      Is Patient on anti-coag: Yes         Protime-INR    Mar 08, 2021 (Approximate)      Is Patient on anti-coag: Yes                 Condition on Discharge:      Stable      This patient has been examined wearing appropriate Personal Protective Equipment and discussed with nursing. 03/02/21      Electronically signed by Hever Person DO, 03/02/21, 1:19 PM EST.      Time: I spent  15 minutes on this discharge activity which included face-to-face encounter with the patient/reviewing the data in the system/coordination of the care with the nursing staff as well as consultants/documentation/entering orders.

## 2021-03-02 NOTE — PLAN OF CARE
Goal Outcome Evaluation:  Plan of Care Reviewed With: patient  Progress: improving  Outcome Summary: Patietn doing well today. Will discharge with coumadin and lovonox this afternoon. Vitals stable

## 2021-03-02 NOTE — TELEPHONE ENCOUNTER
PATIENT WAS SCHEDULED WITH [facility] FOR HER HOSPITAL FOLLOW-UP ON MARCH 17TH. HER PCP WAS NOT AVAILABLE UNTIL THE END OF MARCH AND WAS REQUESTED SHE BE SEEN WITHIN TWO WEEKS OF HER DISCHARGE DATE OF 03/02/21.

## 2021-03-02 NOTE — PROGRESS NOTES
"   LOS: 11 days    Patient Care Team:  Latanya Hawk MD as PCP - General (Family Medicine)  Kit Jacques MD as Consulting Physician (Cardiology)      Subjective     Patient feeling fine.  Denies any chest pain, shortness of breath,nausea  or vomiting      Objective     Vital Sign Min/Max for last 24 hours  Temp:  [97.2 °F (36.2 °C)-98 °F (36.7 °C)] 97.6 °F (36.4 °C)  Heart Rate:  [87-98] 93  Resp:  [14-22] 16  BP: (102-136)/(56-81) 115/65                       Flowsheet Rows      First Filed Value   Admission Height  160 cm (63\") Documented at 02/19/2021 1154   Admission Weight  121 kg (266 lb) Documented at 02/19/2021 1154          I/O this shift:  In: 240 [P.O.:240]  Out: -   I/O last 3 completed shifts:  In: 1200 [P.O.:1200]  Out: 1700 [Urine:1700]    Physical Exam:  Physical Exam    General.  Awake, alert  Head.  Atraumatic, normocephalic  Neck.  Supple  Respiratory.  Clear to auscultation bilaterally  Cardiovascular.  Regular rhythm  Abdomen.  Obese, soft, nontender  Extremities.  No edema     LABS:  Lab Results   Component Value Date    CALCIUM 9.1 03/02/2021    PHOS 3.1 03/02/2021     Results from last 7 days   Lab Units 03/02/21  0138 03/01/21  0259 02/28/21  0138 02/27/21  0141   MAGNESIUM mg/dL 1.8 1.9 2.0 2.0   SODIUM mmol/L 136 138 138 136   POTASSIUM mmol/L 4.0 4.0 4.1 3.6   CHLORIDE mmol/L 104 103 104 103   CO2 mmol/L 23.0 24.0 21.0* 22.0   BUN mg/dL 16 14 19 27*   CREATININE mg/dL 1.00 1.08* 1.14* 1.13*   GLUCOSE mg/dL 107* 114* 96 90   CALCIUM mg/dL 9.1 9.4 9.8 9.1   WBC 10*3/mm3 8.80 8.10  --  9.70   HEMOGLOBIN g/dL 11.8* 11.9*  --  11.8*   PLATELETS 10*3/mm3 196 213  --  196     Lab Results   Component Value Date    TROPONINT 0.018 02/19/2021     Estimated Creatinine Clearance: 68.6 mL/min (by C-G formula based on SCr of 1 mg/dL).      Brief Urine Lab Results  (Last result in the past 365 days)      Color   Clarity   Blood   Leuk Est   Nitrite   Protein   CREAT   Urine " HCG        02/20/21 1530 Yellow Cloudy  Comment:  Result checked  Negative Negative Negative Negative             WEIGHTS:     Wt Readings from Last 1 Encounters:   03/02/21 0500 126 kg (278 lb)   03/01/21 0500 128 kg (281 lb 12 oz)   02/28/21 0500 127 kg (280 lb 13.9 oz)   02/27/21 0500 124 kg (274 lb 0.5 oz)   02/26/21 1049 125 kg (275 lb)   02/26/21 0511 125 kg (275 lb 5.7 oz)   02/25/21 0508 126 kg (277 lb 5.4 oz)   02/24/21 0509 125 kg (276 lb 3.8 oz)   02/23/21 0623 130 kg (286 lb 9.6 oz)   02/22/21 1839 129 kg (285 lb 7.9 oz)   02/22/21 0456 126 kg (277 lb 9 oz)   02/21/21 0253 126 kg (277 lb 5.4 oz)   02/19/21 2032 128 kg (281 lb 4.9 oz)   02/19/21 1523 121 kg (266 lb)   02/19/21 1154 121 kg (266 lb)       allopurinol, 100 mg, Oral, Daily  aspirin, 325 mg, Oral, Daily  budesonide-formoterol, 2 puff, Inhalation, BID - RT  enoxaparin, 1 mg/kg, Subcutaneous, Q12H  insulin lispro, 0-9 Units, Subcutaneous, TID AC  metoprolol succinate XL, 25 mg, Oral, Daily  polyethylene glycol, 17 g, Oral, Daily  sodium chloride, 10 mL, Intravenous, Q12H  spironolactone, 25 mg, Oral, Daily  torsemide, 20 mg, Oral, Daily  warfarin, 5 mg, Oral, Daily      Pharmacy to dose warfarin,         Assessment/Plan       1.  Acute kidney injury  Renal function improving.  Electrolytes, volume status okay    2.Coronary artery disease  Cardiac cath showed LAD lesion with MR, TR and pulmonary hypertension    3.CHFrEF with MR, TR and pulmonary hypertension  Volume status okay.  Continue current diuretics    4.Pulmonary Embolism  On heparin drip     Okay to discharge from renal standpoint      Chris Dahl MD  03/02/21  10:37 EST

## 2021-03-03 ENCOUNTER — TRANSITIONAL CARE MANAGEMENT TELEPHONE ENCOUNTER (OUTPATIENT)
Dept: CALL CENTER | Facility: HOSPITAL | Age: 71
End: 2021-03-03

## 2021-03-03 NOTE — OUTREACH NOTE
Call Center TCM Note      Responses   [facility] patient discharged from?  [facility]   Does the patient have one of the following disease processes/diagnoses(primary or secondary)?  CHF   TCM attempt successful?  No   Unsuccessful attempts  Attempt 2           Brenda Keenan MA    3/3/2021, 16:48 EST

## 2021-03-03 NOTE — OUTREACH NOTE
Call Center TCM Note      Responses   [facility] patient discharged from?  [facility]   Does the patient have one of the following disease processes/diagnoses(primary or secondary)?  CHF   TCM attempt successful?  No   Unsuccessful attempts  Attempt 1           Brenda Keenan MA    3/3/2021, 12:49 EST

## 2021-03-03 NOTE — PROGRESS NOTES
Case Management Discharge Note                Selected Continued Care - Discharged on 3/2/2021 Admission date: 2/19/2021 - Discharge disposition: Home or Self Care                 Final Discharge Disposition Code: 01 - home or self-care

## 2021-03-03 NOTE — OUTREACH NOTE
Prep Survey      Responses   [facility] patient discharged from?  [place]   Is LACE score < 7 ?  No   Emergency Room discharge w/ pulse ox?  Yes   [facility]   Date of Admission  03/19/21   Date of Discharge  03/02/21   Discharge Disposition  Home or Self Care   Discharge diagnosis  Acute on chronic combined systolic and diastolic CHF (congestive heart failure   Does the patient have one of the following disease processes/diagnoses(primary or secondary)?  CHF   Does the patient have Home health ordered?  No   Is there a DME ordered?  No   Prep survey completed?  Yes          Caridad Banegas RN

## 2021-03-04 ENCOUNTER — TRANSITIONAL CARE MANAGEMENT TELEPHONE ENCOUNTER (OUTPATIENT)
Dept: CALL CENTER | Facility: HOSPITAL | Age: 71
End: 2021-03-04

## 2021-03-04 NOTE — OUTREACH NOTE
Call Center TCM Note      Responses   [facility] [place] patient discharged from?  [facility]   Does the patient have one of the following disease processes/diagnoses(primary or secondary)?  CHF   TCM attempt successful?  No   Unsuccessful attempts  Attempt 3   Wrap up additional comments  Unable to reach pt x 3 attempts for tcm call. Pt is schedfor TCM FWP with PCP on 03/17/2021, I will let ofc know this is one day out of approved TCM time frame. Pt needs to be seen no later than 03/16/2021           Brenda Keenan MA    3/4/2021, 14:15 EST

## 2021-03-05 ENCOUNTER — TELEPHONE (OUTPATIENT)
Dept: ORTHOPEDIC SURGERY | Facility: CLINIC | Age: 71
End: 2021-03-05

## 2021-03-11 ENCOUNTER — READMISSION MANAGEMENT (OUTPATIENT)
Dept: CALL CENTER | Facility: HOSPITAL | Age: 71
End: 2021-03-11

## 2021-03-11 ENCOUNTER — TELEPHONE (OUTPATIENT)
Dept: CARDIOLOGY | Facility: CLINIC | Age: 71
End: 2021-03-11

## 2021-03-11 NOTE — OUTREACH NOTE
CHF Week 2 Survey      Responses   [facility] patient discharged from?  [place]   Does the patient have one of the following disease processes/diagnoses(primary or secondary)?  CHF   Week 2 attempt successful?  No   Unsuccessful attempts  Attempt 2          Hayder Amin RN

## 2021-03-15 NOTE — TELEPHONE ENCOUNTER
Attempted to call patient, call went straight to voicemail- left message to return call.       Pt is cleared and can hold Warfarin x 3 days.     Faxed.     Placed in tray for Kasey.

## 2021-03-17 ENCOUNTER — OFFICE VISIT (OUTPATIENT)
Dept: FAMILY MEDICINE CLINIC | Facility: CLINIC | Age: 71
End: 2021-03-17

## 2021-03-17 ENCOUNTER — READMISSION MANAGEMENT (OUTPATIENT)
Dept: CALL CENTER | Facility: HOSPITAL | Age: 71
End: 2021-03-17

## 2021-03-17 VITALS
DIASTOLIC BLOOD PRESSURE: 78 MMHG | BODY MASS INDEX: 48.73 KG/M2 | SYSTOLIC BLOOD PRESSURE: 122 MMHG | WEIGHT: 275 LBS | HEART RATE: 95 BPM | HEIGHT: 63 IN | OXYGEN SATURATION: 94 %

## 2021-03-17 DIAGNOSIS — I26.09 ACUTE PULMONARY EMBOLISM WITH ACUTE COR PULMONALE, UNSPECIFIED PULMONARY EMBOLISM TYPE (HCC): Primary | ICD-10-CM

## 2021-03-17 DIAGNOSIS — I10 ESSENTIAL HYPERTENSION: ICD-10-CM

## 2021-03-17 PROCEDURE — 99213 OFFICE O/P EST LOW 20 MIN: CPT | Performed by: NURSE PRACTITIONER

## 2021-03-17 NOTE — OUTREACH NOTE
CHF Week 3 Survey      Responses   [facility] [place] patient discharged from?  [facility]   Does the patient have one of the following disease processes/diagnoses(primary or secondary)?  CHF   Week 3 attempt successful?  No   Unsuccessful attempts  Attempt 1          Sol Valdovinos RN

## 2021-03-17 NOTE — PROGRESS NOTES
"Eleno Parks is a 70 y.o. female.       HPI   Pt. Is here today for hospital follow up from [facility] from 2/19/21.     Hospital course per hospital record is: \"The patient was admitted to the medical floor and was seen by cardiologist.  VQ scan on 2/19/2021 was suggestive of pulmonary embolism therefore was started on anticoagulation.  Nephrology was consulted for MODESTO.  The patient underwent left and right heart catheterization on 2/21/2021 which showed severe proximal LAD disease, severe pulmonary hypertension, severe TR and severe MR with normal LVEDP.  Cardiaothoracic surgeon was consulted regarding CAD and VHD.  He recommended pulmonology and nephrology consult.  Renal function improved and the patient underwent CTA of chest on 2/25/2021 which showed PE in the distal right pulmonary artery extending in to all lobes and segmental and subsegmental left upper and lower lobe pulmonary artery.  The patient will be discharged on Lovenox bridge and Coumadin and will follow up with Dr. Gonsalves, cardiologist in 2 weeks for further management.  Nephrologist has adjusted diuretic regimen and losartan was held on discharge because of recent MODESTO.\"    Pt. is doing well since discharge.  She has had follow up with cardiology  and has another appt with them on Friday.    She is taking Coumadin 5 mg daily; has been off of it for past two days due to dental procedure; cardiology did clear her for this.  She denies any CP; palpitations; SOA; trouble with breathing; dizziness; headache; trouble with vision.    The following portions of the patient's history were reviewed and updated as appropriate: allergies, current medications, past family history, past medical history, past social history, past surgical history and problem list.    Review of Systems   Constitutional: Negative for activity change, appetite change, chills, diaphoresis, fatigue, fever, unexpected weight gain and unexpected weight loss.   Eyes: Negative " for blurred vision, double vision and visual disturbance.   Respiratory: Negative for cough, chest tightness, shortness of breath and wheezing.    Cardiovascular: Negative for chest pain, palpitations and leg swelling.   Gastrointestinal: Negative for diarrhea, nausea and vomiting.   Genitourinary: Negative for decreased urine volume, difficulty urinating, dysuria, flank pain, frequency and urgency.   Musculoskeletal: Positive for arthralgias (chronic left knee pain).   Skin: Negative for rash.   Neurological: Negative for dizziness, weakness, light-headedness, headache and confusion.   Psychiatric/Behavioral: Negative for depressed mood. The patient is not nervous/anxious.        Objective   Physical Exam  Vitals reviewed.   Constitutional:       General: She is not in acute distress.     Appearance: Normal appearance.   HENT:      Head: Normocephalic and atraumatic.   Cardiovascular:      Rate and Rhythm: Normal rate and regular rhythm.      Pulses: Normal pulses.      Heart sounds: Normal heart sounds. No murmur heard.     Pulmonary:      Effort: Pulmonary effort is normal. No respiratory distress.      Breath sounds: Normal breath sounds. No wheezing.   Chest:      Chest wall: No tenderness.   Abdominal:      General: Abdomen is flat. Bowel sounds are normal. There is no distension.      Palpations: Abdomen is soft.      Tenderness: There is no abdominal tenderness. There is no right CVA tenderness or left CVA tenderness.   Musculoskeletal:      Cervical back: Normal range of motion and neck supple. No tenderness.      Right lower leg: No edema.      Left lower leg: No edema.   Skin:     General: Skin is warm and dry.      Findings: No erythema.   Neurological:      General: No focal deficit present.      Mental Status: She is alert and oriented to person, place, and time.   Psychiatric:         Mood and Affect: Mood normal.           Assessment/Plan   Diagnoses and all orders for this visit:    1. Acute  pulmonary embolism with acute cor pulmonale, unspecified pulmonary embolism type (CMS/HCC) (Primary)  Comments:  Stable.    Cont. current medication.   Keep follow up with cardiology as directed.     2. Essential hypertension  Comments:  Stable.    Cont. current medication.   Keep follow up with cardiology as directed.

## 2021-03-19 ENCOUNTER — OFFICE VISIT (OUTPATIENT)
Dept: CARDIOLOGY | Facility: CLINIC | Age: 71
End: 2021-03-19

## 2021-03-19 VITALS
HEIGHT: 63 IN | SYSTOLIC BLOOD PRESSURE: 137 MMHG | BODY MASS INDEX: 48.73 KG/M2 | TEMPERATURE: 97.5 F | DIASTOLIC BLOOD PRESSURE: 84 MMHG | HEART RATE: 105 BPM | WEIGHT: 275 LBS | OXYGEN SATURATION: 99 %

## 2021-03-19 DIAGNOSIS — I34.0 SEVERE MITRAL REGURGITATION: Chronic | ICD-10-CM

## 2021-03-19 DIAGNOSIS — I25.10 CORONARY ARTERY DISEASE INVOLVING NATIVE CORONARY ARTERY OF NATIVE HEART WITHOUT ANGINA PECTORIS: ICD-10-CM

## 2021-03-19 DIAGNOSIS — E11.9 TYPE 2 DIABETES MELLITUS WITHOUT COMPLICATION, WITHOUT LONG-TERM CURRENT USE OF INSULIN (HCC): Chronic | ICD-10-CM

## 2021-03-19 DIAGNOSIS — I07.1 SEVERE TRICUSPID REGURGITATION: Chronic | ICD-10-CM

## 2021-03-19 DIAGNOSIS — I26.09 ACUTE PULMONARY EMBOLISM WITH ACUTE COR PULMONALE, UNSPECIFIED PULMONARY EMBOLISM TYPE (HCC): ICD-10-CM

## 2021-03-19 DIAGNOSIS — I27.20 PULMONARY HYPERTENSION (HCC): Chronic | ICD-10-CM

## 2021-03-19 DIAGNOSIS — I10 ESSENTIAL HYPERTENSION: Primary | Chronic | ICD-10-CM

## 2021-03-19 PROCEDURE — 99214 OFFICE O/P EST MOD 30 MIN: CPT | Performed by: INTERNAL MEDICINE

## 2021-03-19 NOTE — PROGRESS NOTES
Subjective:     Encounter Date:03/19/2021      Patient ID: Jeanna Parks is a 70 y.o. female.    Chief Complaint: Status Post Cardiac Cath  History of Present Illness        70-year-old -American female patient with a known history of hypertension recently admitted hospital with acute systolic congestive heart failure  Patient echocardiogram showed EF is more than 55% severe pulmonary hypertension moderate to severe tricuspid insufficiency moderate severe mitral insufficiency and EF is 35 to 40% RV sizes mild to moderately dilated biatrial enlargement    Patient decided to go home but readmitted recently      Patient was recently readmitted hospital diagnosed with pulmonary embolus repeat echocardiogram mitral insufficiency definitely better surgery saw the patient decided not a surgical candidate due to severe pulmonary hypertension  Heart rate is regular today patient is feeling well denies of any chest pain may need LAD PCI in the future  Check INR Monday  Follow-up 4 to 6 weeks          The following portions of the patient's history were reviewed and updated as appropriate: Allergies current medications past family history past medical history past social history past surgical history problem list and review of systems  Past Medical History:   Diagnosis Date   • Arthritis    • Congestive heart failure (CMS/[facility]) 12/30/2020   • Gout    • Hypertension 9/3/2015   • Sciatica    • Type 2 diabetes mellitus without complication, without long-term current use of insulin (CMS/[facility]) 9/3/2019    glucometer, test strips and lancets sent new onset start metformin DM education encouraged weight loss     Past Surgical History:   Procedure Laterality Date   • CARDIAC CATHETERIZATION N/A 2/22/2021    Procedure: Cardiac Catheterization/Vascular Study;  Surgeon: Kit Jacques MD;  Location: [facility] CATH INVASIVE LOC[facility];  Service: Cardiovascular;  Laterality: N/A;     /84 (BP Location: Left arm,  "Patient Position: Sitting, Cuff Size: Large Adult)   Pulse 105   Temp 97.5 °F (36.4 °C) (Infrared)   Ht 160 cm (63\")   Wt 125 kg (275 lb)   SpO2 99%   BMI 48.71 kg/m²   Family History   Problem Relation Age of Onset   • Heart disease Mother    • Heart failure Mother    • Heart disease Sister    • Heart failure Sister    • Heart disease Sister    • Heart failure Sister        Current Outpatient Medications:   •  albuterol sulfate  (90 Base) MCG/ACT inhaler, Inhale 2 puffs Every 4 (Four) Hours As Needed for Wheezing., Disp: 18 g, Rfl: 0  •  allopurinol (ZYLOPRIM) 100 MG tablet, Take 1 tablet by mouth Daily., Disp: 30 tablet, Rfl: 0  •  aspirin (aspirin) 81 MG EC tablet, Take 1 tablet by mouth Daily., Disp: 30 tablet, Rfl: 0  •  atorvastatin (Lipitor) 40 MG tablet, Take 1 tablet by mouth Every Night., Disp: 30 tablet, Rfl: 0  •  budesonide-formoterol (Symbicort) 80-4.5 MCG/ACT inhaler, Inhale 2 puffs 2 (Two) Times a Day., Disp: 10.2 g, Rfl: 12  •  metFORMIN (GLUCOPHAGE) 500 MG tablet, TAKE 1 TABLET BY MOUTH TWO  TIMES DAILY WITH MEALS, Disp: 180 tablet, Rfl: 2  •  metoprolol succinate XL (TOPROL-XL) 25 MG 24 hr tablet, Take 25 mg by mouth Daily., Disp: , Rfl:   •  spironolactone (ALDACTONE) 25 MG tablet, Take 1 tablet by mouth Daily., Disp: 30 tablet, Rfl: 0  •  torsemide (DEMADEX) 20 MG tablet, Take 1 tablet by mouth Daily., Disp: 30 tablet, Rfl: 0  •  warfarin (COUMADIN) 5 MG tablet, Take 1 tablet by mouth Every Night. PE  Indications: DVT/PE (active thrombosis), Disp: 30 tablet, Rfl: 0  Social History     Socioeconomic History   • Marital status:      Spouse name: Not on file   • Number of children: Not on file   • Years of education: Not on file   • Highest education level: Not on file   Tobacco Use   • Smoking status: Former Smoker     Types: Cigarettes     Quit date: 2020     Years since quittin.2   • Smokeless tobacco: Never Used   • Tobacco comment: quit a month ago   Vaping Use   • " Vaping Use: Never assessed   Substance and Sexual Activity   • Alcohol use: No   • Drug use: No   • Sexual activity: Defer     No Known Allergies  Review of Systems   Constitutional: Negative for fever and malaise/fatigue.   HENT: Negative for congestion and hearing loss.    Eyes: Negative for double vision and visual disturbance.   Cardiovascular: Negative for chest pain, claudication, dyspnea on exertion, leg swelling and syncope.   Respiratory: Negative for cough and shortness of breath.    Endocrine: Negative for cold intolerance.   Skin: Negative for color change and rash.   Musculoskeletal: Negative for arthritis and joint pain.   Gastrointestinal: Negative for abdominal pain and heartburn.   Genitourinary: Negative for hematuria.   Neurological: Negative for excessive daytime sleepiness and dizziness.   Psychiatric/Behavioral: Negative for depression. The patient is not nervous/anxious.    All other systems reviewed and are negative.             Objective:     Physical Exam  Vital stable heart rate is in the 90s regular neck no JVP elevation lungs bilateral entry mostly clear heart sounds S1-S2 regular extremities trace edema bilateral pulses present  Procedures    Lab Review:       Assessment:          Diagnosis Plan   1. Essential hypertension     2. Type 2 diabetes mellitus without complication, without long-term current use of insulin (CMS/[facility])     3. Severe mitral regurgitation     4. Severe tricuspid regurgitation     5. Pulmonary hypertension (CMS/HCC)     6. Acute pulmonary embolism with acute cor pulmonale, unspecified pulmonary embolism type (CMS/HCC)            Plan:       MDM  Number of Diagnoses or Management Options  Acute pulmonary embolism with acute cor pulmonale, unspecified pulmonary embolism type (CMS/HCC): established, improving  Coronary artery disease involving native coronary artery of native heart without angina pectoris: established, improving  Essential hypertension: established,  improving  Pulmonary hypertension (CMS/[facility]): established, worsening  Severe mitral regurgitation: established, improving  Severe tricuspid regurgitation: established, improving  Type 2 diabetes mellitus without complication, without long-term current use of insulin (CMS/[facility]): established, improving     Amount and/or Complexity of Data Reviewed  Clinical lab tests: ordered and reviewed  Review and summarize past medical records: yes    Risk of Complications, Morbidity, and/or Mortality  Presenting problems: moderate  Management options: moderate    Patient Progress  Patient progress: stable

## 2021-03-22 ENCOUNTER — TELEPHONE (OUTPATIENT)
Dept: FAMILY MEDICINE CLINIC | Facility: CLINIC | Age: 71
End: 2021-03-22

## 2021-03-22 ENCOUNTER — TELEPHONE (OUTPATIENT)
Dept: CARDIOLOGY | Facility: CLINIC | Age: 71
End: 2021-03-22

## 2021-03-22 ENCOUNTER — ANTICOAGULATION VISIT (OUTPATIENT)
Dept: CARDIOLOGY | Facility: CLINIC | Age: 71
End: 2021-03-22

## 2021-03-22 VITALS
WEIGHT: 274 LBS | BODY MASS INDEX: 48.54 KG/M2 | TEMPERATURE: 97.6 F | SYSTOLIC BLOOD PRESSURE: 140 MMHG | DIASTOLIC BLOOD PRESSURE: 84 MMHG | HEART RATE: 90 BPM

## 2021-03-22 DIAGNOSIS — Z79.01 LONG TERM (CURRENT) USE OF ANTICOAGULANTS: ICD-10-CM

## 2021-03-22 DIAGNOSIS — I26.09 ACUTE PULMONARY EMBOLISM WITH ACUTE COR PULMONALE, UNSPECIFIED PULMONARY EMBOLISM TYPE (HCC): ICD-10-CM

## 2021-03-22 LAB — INR PPP: 1.5 (ref 0.9–1.1)

## 2021-03-22 PROCEDURE — 85610 PROTHROMBIN TIME: CPT | Performed by: INTERNAL MEDICINE

## 2021-03-22 PROCEDURE — 36416 COLLJ CAPILLARY BLOOD SPEC: CPT | Performed by: INTERNAL MEDICINE

## 2021-03-22 NOTE — TELEPHONE ENCOUNTER
Patient INR checked in office today pt is subtherapeutic. Pt held warfarin recently for Dental extractions. Advised take 7.5mg today and tomorrow then resume previous plan and recheck INR in PCP office on Friday.     Called spoke with RAJESH Thomas at PCP office. Advised Dr. Gonsalves will not follow INR due to PE being of noncardiac etiology. RAJESH Thomas agrees and patient will be followed by PCP apLPN       Called LMOM For patient to return call. Or advised I will retry tomorrow Lilian

## 2021-03-22 NOTE — TELEPHONE ENCOUNTER
Dr. Gonsalves's office called with report on patient's INR.  It was checked today and found to be 1.5.  They have asked patient to take 7.5 mg of warfarin today and tomorrow; then can go back to 5 mg daily.      Dr. Gonsalves will not be managing her INR further since the cause was not cardiac (due to PE).  She was advised to contact our office for repeat INR test on Friday.      Can you make sure patient gets put on the INR list for Friday.  Thank you.

## 2021-03-24 ENCOUNTER — TELEPHONE (OUTPATIENT)
Dept: CARDIOLOGY | Facility: CLINIC | Age: 71
End: 2021-03-24

## 2021-03-24 NOTE — TELEPHONE ENCOUNTER
Called patient she is asking about a cortisone shot with Dr. Kong.       LMNIDHI for patient to return call.       A clearance needs sent from the ortho office

## 2021-03-24 NOTE — TELEPHONE ENCOUNTER
LMOM for patient to return call.       PCP has tried to contact patient about scheduling INR apLPN

## 2021-03-25 NOTE — TELEPHONE ENCOUNTER
Spoke with patient and transferred her to get scheduled for tomorrow- called her emergency contact to get ahold of her

## 2021-03-26 ENCOUNTER — ANTICOAGULATION VISIT (OUTPATIENT)
Dept: FAMILY MEDICINE CLINIC | Facility: CLINIC | Age: 71
End: 2021-03-26

## 2021-03-26 DIAGNOSIS — Z79.01 LONG TERM (CURRENT) USE OF ANTICOAGULANTS: ICD-10-CM

## 2021-03-26 LAB — INR PPP: 2 (ref 2–3)

## 2021-03-26 PROCEDURE — 85610 PROTHROMBIN TIME: CPT | Performed by: FAMILY MEDICINE

## 2021-03-26 PROCEDURE — 36416 COLLJ CAPILLARY BLOOD SPEC: CPT | Performed by: FAMILY MEDICINE

## 2021-03-30 RX ORDER — TORSEMIDE 20 MG/1
20 TABLET ORAL DAILY
Qty: 30 TABLET | Refills: 6 | Status: SHIPPED | OUTPATIENT
Start: 2021-03-30 | End: 2021-04-22 | Stop reason: SDUPTHER

## 2021-03-30 RX ORDER — ATORVASTATIN CALCIUM 40 MG/1
40 TABLET, FILM COATED ORAL NIGHTLY
Qty: 30 TABLET | Refills: 6 | Status: SHIPPED | OUTPATIENT
Start: 2021-03-30 | End: 2021-04-22 | Stop reason: SDUPTHER

## 2021-03-30 RX ORDER — ASPIRIN 81 MG/1
81 TABLET ORAL DAILY
Qty: 30 TABLET | Refills: 6 | Status: SHIPPED | OUTPATIENT
Start: 2021-03-30 | End: 2021-06-21

## 2021-03-30 RX ORDER — SPIRONOLACTONE 25 MG/1
25 TABLET ORAL DAILY
Qty: 30 TABLET | Refills: 6 | Status: SHIPPED | OUTPATIENT
Start: 2021-03-30 | End: 2021-04-22 | Stop reason: SDUPTHER

## 2021-03-30 NOTE — TELEPHONE ENCOUNTER
PCP manages Warfarin. Advised patient to contact PCP office for refill.     Advised patient Allopurinol needs filled through PCP apLPN

## 2021-03-30 NOTE — TELEPHONE ENCOUNTER
spironalactone 25 mg 1 qd   Asa 81 mg 1 qd  allpurinol 100 mg 1 qd   Torsemide 20 mg 1 qd   Atorvastatin 40 mg 1 qd   Warfarin 5 mg as directed   [address]

## 2021-04-02 RX ORDER — WARFARIN SODIUM 5 MG/1
5 TABLET ORAL NIGHTLY
Qty: 90 TABLET | Refills: 1 | Status: SHIPPED | OUTPATIENT
Start: 2021-04-02 | End: 2021-04-22 | Stop reason: SDUPTHER

## 2021-04-02 NOTE — TELEPHONE ENCOUNTER
D  Caller: Jeanna Brown    Relationship: Self    Best call back number: [phone]    Medication needed:   Requested Prescriptions     Pending Prescriptions Disp Refills   • warfarin (COUMADIN) 5 MG tablet 30 tablet 0     Sig: Take 1 tablet by mouth Every Night. PE  Indications: DVT/PE (active thrombosis)       When do you need the refill by: ASAP    What additional details did the patient provide when requesting the medication: 2 PILLS LEFT    Does the patient have less than a 3 day supply:  [x] Yes  [] No    What is the patient's preferred pharmacy: [facility] [place] [address] [phone] [place] [phone]

## 2021-04-22 DIAGNOSIS — J81.0 ACUTE PULMONARY EDEMA (HCC): ICD-10-CM

## 2021-04-22 DIAGNOSIS — R06.02 SHORTNESS OF BREATH: ICD-10-CM

## 2021-04-22 DIAGNOSIS — J18.9 PNEUMONIA OF RIGHT LUNG DUE TO INFECTIOUS ORGANISM, UNSPECIFIED PART OF LUNG: ICD-10-CM

## 2021-04-22 RX ORDER — SPIRONOLACTONE 25 MG/1
25 TABLET ORAL DAILY
Qty: 90 TABLET | Refills: 3 | Status: SHIPPED | OUTPATIENT
Start: 2021-04-22 | End: 2021-04-26

## 2021-04-22 RX ORDER — ALBUTEROL SULFATE 90 UG/1
2 AEROSOL, METERED RESPIRATORY (INHALATION) EVERY 4 HOURS PRN
Qty: 18 G | Refills: 0 | Status: SHIPPED | OUTPATIENT
Start: 2021-04-22 | End: 2021-04-26 | Stop reason: SDUPTHER

## 2021-04-22 RX ORDER — ATORVASTATIN CALCIUM 40 MG/1
40 TABLET, FILM COATED ORAL NIGHTLY
Qty: 90 TABLET | Refills: 3 | Status: SHIPPED | OUTPATIENT
Start: 2021-04-22 | End: 2022-01-24 | Stop reason: SDUPTHER

## 2021-04-22 RX ORDER — TORSEMIDE 20 MG/1
20 TABLET ORAL DAILY
Qty: 90 TABLET | Refills: 3 | Status: SHIPPED | OUTPATIENT
Start: 2021-04-22 | End: 2022-01-24 | Stop reason: SDUPTHER

## 2021-04-22 RX ORDER — WARFARIN SODIUM 5 MG/1
5 TABLET ORAL NIGHTLY
Qty: 90 TABLET | Refills: 1 | Status: SHIPPED | OUTPATIENT
Start: 2021-04-22 | End: 2021-04-26 | Stop reason: SDUPTHER

## 2021-04-22 RX ORDER — METOPROLOL SUCCINATE 25 MG/1
25 TABLET, EXTENDED RELEASE ORAL DAILY
Qty: 90 TABLET | Refills: 3 | Status: SHIPPED | OUTPATIENT
Start: 2021-04-22 | End: 2022-01-24 | Stop reason: SDUPTHER

## 2021-04-22 RX ORDER — ALLOPURINOL 100 MG/1
100 TABLET ORAL DAILY
Qty: 90 TABLET | Refills: 0 | Status: SHIPPED | OUTPATIENT
Start: 2021-04-22 | End: 2021-04-26 | Stop reason: SDUPTHER

## 2021-04-23 ENCOUNTER — ANTICOAGULATION VISIT (OUTPATIENT)
Dept: CARDIOLOGY | Facility: CLINIC | Age: 71
End: 2021-04-23

## 2021-04-23 ENCOUNTER — OFFICE VISIT (OUTPATIENT)
Dept: CARDIOLOGY | Facility: CLINIC | Age: 71
End: 2021-04-23

## 2021-04-23 VITALS
DIASTOLIC BLOOD PRESSURE: 71 MMHG | WEIGHT: 276 LBS | HEART RATE: 87 BPM | OXYGEN SATURATION: 98 % | SYSTOLIC BLOOD PRESSURE: 111 MMHG | HEIGHT: 63 IN | BODY MASS INDEX: 48.9 KG/M2 | TEMPERATURE: 97.1 F

## 2021-04-23 VITALS
SYSTOLIC BLOOD PRESSURE: 111 MMHG | BODY MASS INDEX: 48.89 KG/M2 | HEART RATE: 87 BPM | DIASTOLIC BLOOD PRESSURE: 71 MMHG | WEIGHT: 276 LBS

## 2021-04-23 DIAGNOSIS — I26.09 OTHER ACUTE PULMONARY EMBOLISM WITH ACUTE COR PULMONALE (HCC): ICD-10-CM

## 2021-04-23 DIAGNOSIS — E11.9 TYPE 2 DIABETES MELLITUS WITHOUT COMPLICATION, WITHOUT LONG-TERM CURRENT USE OF INSULIN (HCC): Chronic | ICD-10-CM

## 2021-04-23 DIAGNOSIS — I27.20 PULMONARY HYPERTENSION (HCC): Chronic | ICD-10-CM

## 2021-04-23 DIAGNOSIS — I26.09 ACUTE PULMONARY EMBOLISM WITH ACUTE COR PULMONALE, UNSPECIFIED PULMONARY EMBOLISM TYPE (HCC): Primary | ICD-10-CM

## 2021-04-23 DIAGNOSIS — Z79.01 LONG TERM (CURRENT) USE OF ANTICOAGULANTS: ICD-10-CM

## 2021-04-23 DIAGNOSIS — I10 ESSENTIAL HYPERTENSION: Primary | Chronic | ICD-10-CM

## 2021-04-23 DIAGNOSIS — I25.10 CORONARY ARTERY DISEASE INVOLVING NATIVE CORONARY ARTERY OF NATIVE HEART WITHOUT ANGINA PECTORIS: ICD-10-CM

## 2021-04-23 DIAGNOSIS — I27.81 COR PULMONALE (CHRONIC) (HCC): Chronic | ICD-10-CM

## 2021-04-23 DIAGNOSIS — I07.1 SEVERE TRICUSPID REGURGITATION: Chronic | ICD-10-CM

## 2021-04-23 DIAGNOSIS — I34.0 SEVERE MITRAL REGURGITATION: Chronic | ICD-10-CM

## 2021-04-23 LAB — INR PPP: 2 (ref 0.9–1.1)

## 2021-04-23 PROCEDURE — 36416 COLLJ CAPILLARY BLOOD SPEC: CPT | Performed by: INTERNAL MEDICINE

## 2021-04-23 PROCEDURE — 99213 OFFICE O/P EST LOW 20 MIN: CPT | Performed by: INTERNAL MEDICINE

## 2021-04-23 PROCEDURE — 85610 PROTHROMBIN TIME: CPT | Performed by: INTERNAL MEDICINE

## 2021-04-23 RX ORDER — HYALURONATE SODIUM 30 MG/2 ML
SYRINGE (ML) INTRAARTICULAR
Status: ON HOLD | COMMUNITY
Start: 2021-04-06 | End: 2021-05-26

## 2021-04-23 RX ORDER — METOPROLOL SUCCINATE 25 MG/1
25 TABLET, EXTENDED RELEASE ORAL DAILY
Qty: 90 TABLET | Refills: 3 | Status: CANCELLED | OUTPATIENT
Start: 2021-04-23

## 2021-04-23 NOTE — PROGRESS NOTES
Subjective:     Encounter Date:04/23/2021      Patient ID: Jeanna Parks is a 70 y.o. female.    Chief Complaint: 5 wk f/u HTN, CAD    History of Present Illness           70-year-old -American female patient with a known history of hypertension recently admitted hospital with acute systolic congestive heart failure  Patient echocardiogram showed EF is more than 55% severe pulmonary hypertension moderate to severe tricuspid insufficiency moderate severe mitral insufficiency and EF is 35 to 40% RV sizes mild to moderately dilated biatrial enlargement     Patient decided to go home but readmitted      Patient was readmitted hospital diagnosed with pulmonary embolus repeat echocardiogram mitral insufficiency definitely better    CV surgery saw the patient decided not a surgical candidate due to severe pulmonary hypertension    Patient denies of any chest pain she needs LAD stent at some point  Currently on anticoagulation advised her to check the INR today also hematology consultation regarding the duration of the anticoagulation for PE  Follow-up in the next 3 weeks  Hopefully we can stop the anticoagulation for 3 to 4 days so that she can undergo LAD stent  Consider ACE inhibitor's if blood pressure tolerates in the future      The following portions of the patient's history were reviewed and updated as appropriate: Allergies current medications past family history past medical history past social history past surgical history problem list and review of systems  Past Medical History:   Diagnosis Date   • Arthritis    • Congestive heart failure (CMS/[facility]) 12/30/2020   • Gout    • Hypertension 9/3/2015   • Sciatica    • Type 2 diabetes mellitus without complication, without long-term current use of insulin (CMS/[facility]) 9/3/2019    glucometer, test strips and lancets sent new onset start metformin DM education encouraged weight loss     Past Surgical History:   Procedure Laterality Date   • CARDIAC  CATHETERIZATION N/A 2/22/2021    Procedure: Cardiac Catheterization/Vascular Study;  Surgeon: Kit Jacques MD;  Location: [facility] CATH INVASIVE LOCATION;  Service: Cardiovascular;  Laterality: N/A;     There were no vitals taken for this visit.  Family History   Problem Relation Age of Onset   • Heart disease Mother    • Heart failure Mother    • Heart disease Sister    • Heart failure Sister    • Heart disease Sister    • Heart failure Sister        Current Outpatient Medications:   •  albuterol sulfate  (90 Base) MCG/ACT inhaler, Inhale 2 puffs Every 4 (Four) Hours As Needed for Wheezing., Disp: 18 g, Rfl: 0  •  allopurinol (ZYLOPRIM) 100 MG tablet, Take 1 tablet by mouth Daily., Disp: 90 tablet, Rfl: 0  •  aspirin (aspirin) 81 MG EC tablet, Take 1 tablet by mouth Daily., Disp: 30 tablet, Rfl: 6  •  atorvastatin (Lipitor) 40 MG tablet, Take 1 tablet by mouth Every Night., Disp: 90 tablet, Rfl: 3  •  budesonide-formoterol (Symbicort) 80-4.5 MCG/ACT inhaler, Inhale 2 puffs 2 (Two) Times a Day., Disp: 10.2 g, Rfl: 12  •  metFORMIN (GLUCOPHAGE) 500 MG tablet, TAKE 1 TABLET BY MOUTH TWO  TIMES DAILY WITH MEALS, Disp: 180 tablet, Rfl: 2  •  metoprolol succinate XL (TOPROL-XL) 25 MG 24 hr tablet, Take 1 tablet by mouth Daily., Disp: 90 tablet, Rfl: 3  •  spironolactone (ALDACTONE) 25 MG tablet, Take 1 tablet by mouth Daily., Disp: 90 tablet, Rfl: 3  •  torsemide (DEMADEX) 20 MG tablet, Take 1 tablet by mouth Daily., Disp: 90 tablet, Rfl: 3  •  warfarin (COUMADIN) 5 MG tablet, Take 1 tablet by mouth Every Night. PE  Indications: DVT/PE (active thrombosis), Disp: 90 tablet, Rfl: 1  Social History     Socioeconomic History   • Marital status:      Spouse name: Not on file   • Number of children: Not on file   • Years of education: Not on file   • Highest education level: Not on file   Tobacco Use   • Smoking status: Former Smoker     Types: Cigarettes     Quit date: 12/1/2020     Years since  quittin.3   • Smokeless tobacco: Never Used   • Tobacco comment: quit a month ago   Vaping Use   • Vaping Use: Never assessed   Substance and Sexual Activity   • Alcohol use: No   • Drug use: No   • Sexual activity: Defer     No Known Allergies  Review of Systems   Constitutional: Negative for chills, fever and malaise/fatigue.   Cardiovascular: Negative for chest pain, dyspnea on exertion, leg swelling, palpitations and syncope.   Respiratory: Negative for shortness of breath.    Skin: Negative for rash.   Neurological: Negative for dizziness, light-headedness and numbness.              Objective:     Physical Exam  Alert vital stable neck no JVP elevation lungs bilateral mostly clear heart sounds S1-S2 regular extremities no edema bilateral pulses present equal  Procedures    Lab Review:       Assessment:         No diagnosis found.       Plan:       MDM  Number of Diagnoses or Management Options  Cor pulmonale (chronic) (CMS/HCC): established, improving  Coronary artery disease involving native coronary artery of native heart without angina pectoris: established, improving  Essential hypertension: established, improving  Other acute pulmonary embolism with acute cor pulmonale (CMS/HCC): established, improving  Pulmonary hypertension (CMS/HCC): established, improving  Severe mitral regurgitation: established, improving  Severe tricuspid regurgitation: established, improving  Type 2 diabetes mellitus without complication, without long-term current use of insulin (CMS/HCC): established, improving     Amount and/or Complexity of Data Reviewed  Clinical lab tests: ordered and reviewed    Risk of Complications, Morbidity, and/or Mortality  Presenting problems: moderate  Management options: moderate    Patient Progress  Patient progress: stable

## 2021-04-26 ENCOUNTER — TELEPHONE (OUTPATIENT)
Dept: CARDIOLOGY | Facility: CLINIC | Age: 71
End: 2021-04-26

## 2021-04-26 DIAGNOSIS — J18.9 PNEUMONIA OF RIGHT LUNG DUE TO INFECTIOUS ORGANISM, UNSPECIFIED PART OF LUNG: ICD-10-CM

## 2021-04-26 DIAGNOSIS — I26.99 PULMONARY EMBOLISM ON LONG-TERM ANTICOAGULATION THERAPY (HCC): Primary | ICD-10-CM

## 2021-04-26 DIAGNOSIS — Z79.01 PULMONARY EMBOLISM ON LONG-TERM ANTICOAGULATION THERAPY (HCC): Primary | ICD-10-CM

## 2021-04-26 DIAGNOSIS — J81.0 ACUTE PULMONARY EDEMA (HCC): ICD-10-CM

## 2021-04-26 DIAGNOSIS — R06.02 SHORTNESS OF BREATH: ICD-10-CM

## 2021-04-26 RX ORDER — ALLOPURINOL 100 MG/1
100 TABLET ORAL DAILY
Qty: 90 TABLET | Refills: 0 | Status: SHIPPED | OUTPATIENT
Start: 2021-04-26 | End: 2021-05-05 | Stop reason: SDUPTHER

## 2021-04-26 RX ORDER — WARFARIN SODIUM 5 MG/1
5 TABLET ORAL NIGHTLY
Qty: 90 TABLET | Refills: 1 | Status: SHIPPED | OUTPATIENT
Start: 2021-04-26 | End: 2021-11-04

## 2021-04-26 RX ORDER — SPIRONOLACTONE 25 MG/1
TABLET ORAL
Qty: 90 TABLET | Refills: 3 | Status: SHIPPED | OUTPATIENT
Start: 2021-04-26 | End: 2022-01-24 | Stop reason: SDUPTHER

## 2021-04-26 RX ORDER — ALBUTEROL SULFATE 90 UG/1
2 AEROSOL, METERED RESPIRATORY (INHALATION) EVERY 4 HOURS PRN
Qty: 18 G | Refills: 0 | Status: SHIPPED | OUTPATIENT
Start: 2021-04-26 | End: 2022-01-24 | Stop reason: SDUPTHER

## 2021-04-26 NOTE — TELEPHONE ENCOUNTER
At checkout Dr. Gonsalves stated pt needed to be seen by Dr. Davalos. Please put in order so referral can be sent. Thank you.

## 2021-05-05 RX ORDER — ALLOPURINOL 100 MG/1
100 TABLET ORAL DAILY
Qty: 90 TABLET | Refills: 0 | Status: SHIPPED | OUTPATIENT
Start: 2021-05-05 | End: 2021-09-30

## 2021-05-05 NOTE — TELEPHONE ENCOUNTER
Caller: Jeanna Brown    Relationship: Self    Best call back number: [phone]    Medication needed:   Requested Prescriptions     Pending Prescriptions Disp Refills   • allopurinol (ZYLOPRIM) 100 MG tablet 90 tablet 0     Sig: Take 1 tablet by mouth Daily.       When do you need the refill by: ASAP      Does the patient have less than a 3 day supply:  [x] Yes  [] No    What is the patient's preferred pharmacy: [facility] [place] [phone] [place] [phone]

## 2021-05-10 ENCOUNTER — OFFICE VISIT (OUTPATIENT)
Dept: CARDIAC SURGERY | Facility: CLINIC | Age: 71
End: 2021-05-10

## 2021-05-10 VITALS
DIASTOLIC BLOOD PRESSURE: 72 MMHG | OXYGEN SATURATION: 99 % | TEMPERATURE: 96.9 F | HEART RATE: 96 BPM | HEIGHT: 64 IN | BODY MASS INDEX: 46.78 KG/M2 | WEIGHT: 274 LBS | SYSTOLIC BLOOD PRESSURE: 121 MMHG | RESPIRATION RATE: 20 BRPM

## 2021-05-10 DIAGNOSIS — E11.9 TYPE 2 DIABETES MELLITUS WITHOUT COMPLICATION, WITHOUT LONG-TERM CURRENT USE OF INSULIN (HCC): Chronic | ICD-10-CM

## 2021-05-10 DIAGNOSIS — I07.1 SEVERE TRICUSPID REGURGITATION: Chronic | ICD-10-CM

## 2021-05-10 DIAGNOSIS — I27.81 COR PULMONALE (CHRONIC) (HCC): Chronic | ICD-10-CM

## 2021-05-10 DIAGNOSIS — I50.42 CHRONIC COMBINED SYSTOLIC AND DIASTOLIC CONGESTIVE HEART FAILURE (HCC): ICD-10-CM

## 2021-05-10 DIAGNOSIS — M54.16 LUMBAR RADICULOPATHY: ICD-10-CM

## 2021-05-10 DIAGNOSIS — I27.20 PULMONARY HYPERTENSION (HCC): Chronic | ICD-10-CM

## 2021-05-10 DIAGNOSIS — F41.1 GENERALIZED ANXIETY DISORDER: ICD-10-CM

## 2021-05-10 DIAGNOSIS — Z79.01 LONG TERM (CURRENT) USE OF ANTICOAGULANTS: ICD-10-CM

## 2021-05-10 DIAGNOSIS — M19.90 OSTEOARTHRITIS, UNSPECIFIED OSTEOARTHRITIS TYPE, UNSPECIFIED SITE: ICD-10-CM

## 2021-05-10 DIAGNOSIS — I42.0 DILATED CARDIOMYOPATHY (HCC): Primary | Chronic | ICD-10-CM

## 2021-05-10 DIAGNOSIS — I34.0 SEVERE MITRAL REGURGITATION: Chronic | ICD-10-CM

## 2021-05-10 DIAGNOSIS — E66.01 OBESITY, MORBID, BMI 50 OR HIGHER (HCC): Chronic | ICD-10-CM

## 2021-05-10 PROCEDURE — 99212 OFFICE O/P EST SF 10 MIN: CPT | Performed by: THORACIC SURGERY (CARDIOTHORACIC VASCULAR SURGERY)

## 2021-05-19 ENCOUNTER — OFFICE VISIT (OUTPATIENT)
Dept: CARDIOLOGY | Facility: CLINIC | Age: 71
End: 2021-05-19

## 2021-05-19 VITALS
SYSTOLIC BLOOD PRESSURE: 111 MMHG | DIASTOLIC BLOOD PRESSURE: 75 MMHG | OXYGEN SATURATION: 98 % | BODY MASS INDEX: 50.14 KG/M2 | WEIGHT: 283 LBS | HEART RATE: 85 BPM | HEIGHT: 63 IN

## 2021-05-19 DIAGNOSIS — I26.99 PULMONARY EMBOLISM, UNSPECIFIED CHRONICITY, UNSPECIFIED PULMONARY EMBOLISM TYPE, UNSPECIFIED WHETHER ACUTE COR PULMONALE PRESENT (HCC): ICD-10-CM

## 2021-05-19 DIAGNOSIS — I34.0 SEVERE MITRAL REGURGITATION: Chronic | ICD-10-CM

## 2021-05-19 DIAGNOSIS — I25.118 CORONARY ARTERY DISEASE OF NATIVE ARTERY OF NATIVE HEART WITH STABLE ANGINA PECTORIS (HCC): ICD-10-CM

## 2021-05-19 DIAGNOSIS — E11.9 TYPE 2 DIABETES MELLITUS WITHOUT COMPLICATION, WITHOUT LONG-TERM CURRENT USE OF INSULIN (HCC): Chronic | ICD-10-CM

## 2021-05-19 DIAGNOSIS — I10 ESSENTIAL HYPERTENSION: Primary | Chronic | ICD-10-CM

## 2021-05-19 DIAGNOSIS — I27.20 PULMONARY HYPERTENSION (HCC): Chronic | ICD-10-CM

## 2021-05-19 DIAGNOSIS — I07.1 SEVERE TRICUSPID REGURGITATION: Chronic | ICD-10-CM

## 2021-05-19 PROCEDURE — 99213 OFFICE O/P EST LOW 20 MIN: CPT | Performed by: INTERNAL MEDICINE

## 2021-05-19 NOTE — PROGRESS NOTES
Subjective:     Encounter Date:05/19/2021      Patient ID: Jeanna Parks is a 70 y.o. female.    Chief Complaint: Follow-up CAD  History of Present Illness   70-year-old -American female patient with a known history of hypertension recently admitted hospital with acute systolic congestive heart failure  Patient echocardiogram showed EF is more than 55% severe pulmonary hypertension moderate to severe tricuspid insufficiency moderate severe mitral insufficiency and EF is 35 to 40% RV sizes mild to moderately dilated biatrial enlargement     Patient decided to go home but readmitted      Patient was readmitted hospital diagnosed with pulmonary embolus repeat echocardiogram mitral insufficiency definitely better     CV surgery saw the patient decided not a surgical candidate due to severe pulmonary hypertension     Patient denies of any chest pain she needs LAD stent at some point  Currently on anticoagulation advised her to check the INR today also hematology consultation regarding the duration of the anticoagulation for PE  Follow-up in the next 3 weeks  Hopefully we can stop the anticoagulation for 3 to 4 days so that she can undergo LAD stent  Consider ACE inhibitor's if blood pressure tolerates in the future      Patient comes back for follow-up doing well wants to go ahead with LAD PCI  Patient will see interventional cardiology and schedule in the next 1 or 2 weeks  I strongly advised her to see hematology regarding anticoagulation and the duration    The following portions of the patient's history were reviewed and updated as appropriate: Allergies current medications past family history past medical history past social history past surgical history problem list and review of systems  Past Medical History:   Diagnosis Date   • Arthritis    • Congestive heart failure (CMS/HCC) 12/30/2020   • Gout    • Hypertension 9/3/2015   • Sciatica    • Type 2 diabetes mellitus without complication, without  "long-term current use of insulin (CMS/[facility]) 9/3/2019    glucometer, test strips and lancets sent new onset start metformin DM education encouraged weight loss     Past Surgical History:   Procedure Laterality Date   • CARDIAC CATHETERIZATION N/A 2/22/2021    Procedure: Cardiac Catheterization/Vascular Study;  Surgeon: Kit Jacques MD;  Location: [facility] CATH INVASIVE LOCATION;  Service: Cardiovascular;  Laterality: N/A;     /75   Pulse 85   Ht 160 cm (63\")   Wt 128 kg (283 lb)   SpO2 98%   BMI 50.13 kg/m²   Family History   Problem Relation Age of Onset   • Heart disease Mother    • Heart failure Mother    • Heart disease Sister    • Heart failure Sister    • Heart disease Sister    • Heart failure Sister        Current Outpatient Medications:   •  albuterol sulfate  (90 Base) MCG/ACT inhaler, Inhale 2 puffs Every 4 (Four) Hours As Needed for Wheezing., Disp: 18 g, Rfl: 0  •  allopurinol (ZYLOPRIM) 100 MG tablet, Take 1 tablet by mouth Daily., Disp: 90 tablet, Rfl: 0  •  aspirin (aspirin) 81 MG EC tablet, Take 1 tablet by mouth Daily., Disp: 30 tablet, Rfl: 6  •  atorvastatin (Lipitor) 40 MG tablet, Take 1 tablet by mouth Every Night., Disp: 90 tablet, Rfl: 3  •  budesonide-formoterol (Symbicort) 80-4.5 MCG/ACT inhaler, Inhale 2 puffs 2 (Two) Times a Day., Disp: 10.2 g, Rfl: 12  •  metFORMIN (GLUCOPHAGE) 500 MG tablet, TAKE 1 TABLET BY MOUTH TWO  TIMES DAILY WITH MEALS, Disp: 180 tablet, Rfl: 2  •  metoprolol succinate XL (TOPROL-XL) 25 MG 24 hr tablet, Take 1 tablet by mouth Daily., Disp: 90 tablet, Rfl: 3  •  spironolactone (ALDACTONE) 25 MG tablet, TAKE 1 TABLET EVERY DAY, Disp: 90 tablet, Rfl: 3  •  torsemide (DEMADEX) 20 MG tablet, Take 1 tablet by mouth Daily., Disp: 90 tablet, Rfl: 3  •  warfarin (COUMADIN) 5 MG tablet, Take 1 tablet by mouth Every Night. PE  Indications: DVT/PE (active thrombosis), Disp: 90 tablet, Rfl: 1  •  OrthoVisc 30 MG/2ML solution prefilled syringe " injection, , Disp: , Rfl:   Social History     Socioeconomic History   • Marital status:      Spouse name: Not on file   • Number of children: Not on file   • Years of education: Not on file   • Highest education level: Not on file   Tobacco Use   • Smoking status: Former Smoker     Types: Cigarettes     Quit date: 2020     Years since quittin.4   • Smokeless tobacco: Never Used   • Tobacco comment: quit a month ago   Vaping Use   • Vaping Use: Never used   Substance and Sexual Activity   • Alcohol use: No   • Drug use: No   • Sexual activity: Defer     No Known Allergies  Review of Systems   Constitutional: Negative for malaise/fatigue.   Cardiovascular: Negative for chest pain, leg swelling and palpitations.   Respiratory: Positive for shortness of breath.    Skin: Negative for rash.   Gastrointestinal: Negative for nausea and vomiting.   Neurological: Negative for dizziness, light-headedness and numbness.              Objective:     Physical Exam  Vital stable neck no JVP elevation lungs bilateral mostly clear heart sounds S1-S2 present extremities no edema bilateral pulses present equal  Procedures    Lab Review:       Assessment:          Diagnosis Plan   1. Essential hypertension     2. Type 2 diabetes mellitus without complication, without long-term current use of insulin (CMS/[facility])     3. Severe mitral regurgitation     4. Severe tricuspid regurgitation     5. Pulmonary hypertension (CMS/[facility])     6. Pulmonary embolism, unspecified chronicity, unspecified pulmonary embolism type, unspecified whether acute cor pulmonale present (CMS/[facility])  Ambulatory Referral to Hematology / Oncology          Plan:       MDM  Number of Diagnoses or Management Options  Coronary artery disease of native artery of native heart with stable angina pectoris (CMS/[facility]): established, improving  Essential hypertension: established, improving  Pulmonary embolism, unspecified chronicity, unspecified pulmonary embolism type,  unspecified whether acute cor pulmonale present (CMS/HCC): established, improving  Pulmonary hypertension (CMS/HCC): established, improving  Severe mitral regurgitation: established, improving  Severe tricuspid regurgitation: established, improving  Type 2 diabetes mellitus without complication, without long-term current use of insulin (CMS/[facility]): established, improving     Amount and/or Complexity of Data Reviewed  Review and summarize past medical records: yes    Risk of Complications, Morbidity, and/or Mortality  Presenting problems: moderate  Management options: moderate    Patient Progress  Patient progress: stable

## 2021-05-20 ENCOUNTER — OFFICE VISIT (OUTPATIENT)
Dept: CARDIOLOGY | Facility: CLINIC | Age: 71
End: 2021-05-20

## 2021-05-20 VITALS
BODY MASS INDEX: 50.14 KG/M2 | SYSTOLIC BLOOD PRESSURE: 112 MMHG | WEIGHT: 283 LBS | OXYGEN SATURATION: 99 % | HEIGHT: 63 IN | HEART RATE: 85 BPM | DIASTOLIC BLOOD PRESSURE: 74 MMHG

## 2021-05-20 DIAGNOSIS — R06.09 DYSPNEA ON EXERTION: Primary | ICD-10-CM

## 2021-05-20 DIAGNOSIS — I10 ESSENTIAL HYPERTENSION: ICD-10-CM

## 2021-05-20 DIAGNOSIS — I50.22 CHRONIC HFREF (HEART FAILURE WITH REDUCED EJECTION FRACTION) (HCC): ICD-10-CM

## 2021-05-20 DIAGNOSIS — I25.5 ISCHEMIC CARDIOMYOPATHY: ICD-10-CM

## 2021-05-20 DIAGNOSIS — E11.9 TYPE 2 DIABETES MELLITUS WITHOUT COMPLICATION, WITHOUT LONG-TERM CURRENT USE OF INSULIN (HCC): ICD-10-CM

## 2021-05-20 DIAGNOSIS — I25.118 CORONARY ARTERY DISEASE OF NATIVE ARTERY OF NATIVE HEART WITH STABLE ANGINA PECTORIS (HCC): ICD-10-CM

## 2021-05-20 PROCEDURE — 99214 OFFICE O/P EST MOD 30 MIN: CPT | Performed by: INTERNAL MEDICINE

## 2021-05-20 RX ORDER — LISINOPRIL 2.5 MG/1
2.5 TABLET ORAL DAILY
Qty: 30 TABLET | Refills: 11 | Status: SHIPPED | OUTPATIENT
Start: 2021-05-20 | End: 2022-01-24 | Stop reason: SDUPTHER

## 2021-05-20 NOTE — PROGRESS NOTES
Subjective:     Encounter Date:05/20/2021      Patient ID: Jeanna Parks is a 70 y.o. female.    Chief Complaint : Dyspnea on exertion, CAD, CHF  History of Present Illness      This is a 70-year-old with PMH of    CAD, cath 2/22/2021 severe proximal LAD disease  HFrEF, right and left heart cath 2/21/2021 revealing severe pulmonary hypertension, severe TR, MR  Moderate to severe mitral regurgitation  Severe TR, severe pulmonary hypertension  Cardiomyopathy, combination of ischemic plus or minus valvular heart disease  Hypertension  Dyslipidemia  diabetes   Obesity with BMI over 70  DVT  PE 2/25/2021  Gout, sciatica  No history of any surgeries  Positive family history of premature heart disease in multiple sisters and mother.  Former smoker quit this year      Is here with complaint of dyspnea on exertion and CAD.  Patient had a cardiac cath 2/22/2021 which revealed subtotal occlusion of proximal LAD, echocardiogram 2/22/2021 reviewed revealing LV dysfunction with EF of 35-40% and diastolic dysfunction with RV enlargement and severe pulmonary hypertension and moderate to severe mitral regurgitation and biatrial enlargement.  Reviewed detailed hospital records.  Patient was in the hospital 2/19/2021-3/2/2021 with shortness of breath was diagnosed with acute PE, severe MR, severe TR, pulmonary hypertension, cor pulmonale, CAD.  Review of records reveal labs from 12/31/2020 cholesterol 137 triglycerides 129, HDL 46, LDL 68.  Hemoglobin A1c 6/30/2019 was 6.4 is improved to 5.4 on 12/30/20.  Normal CMP 12/30/2019 except for elevated glucose at 130    Assessment ;    Dyspnea on exertion possibly anginal equivalent in a diabetic patient  CAD with proximal LAD disease  HFrEF  Ischemic cardiomyopathy EF of 35 to 40%  Valvular heart disease with severe MR and severe TR  Severe pulmonary hypertension  Hypertension  Dyslipidemia  Type 2 diabetes  Morbid obesity with BMI over 50     Recommendations and  plan     Reviewed cardiac cath films with patient  Continue medical management with aspirin, atorvastatin, metoprolol succinate, Aldactone, torsemide.  We will add lisinopril 2.5 mg daily.  To help with CAD, CHF, cardiomyopathy, valvular heart disease, pulmonary hypertension as tolerated.  Monitor renal function and electrolytes  Reviewed diabetes with patient patient says she does not have diabetes but she takes Metformin.  I reviewed labs with patient from 6/30/2019 with A1c of 6.4.  We will continue atorvastatin for dyslipidemia.  We will continue warfarin for DVT PE, make appointment with hematology to discuss about duration of anticoagulation.  Will hold warfarin and schedule for Impella assisted PCI.  Risk benefits alternatives explained.  We will follow up and consider further evaluation and treatment    Assessment:         MDM     Diagnosis Plan   1. Dyspnea on exertion  Case Request Cath Lab: Left Heart Cath,impella assisted PCI    CBC (No Diff)    Basic Metabolic Panel    Protime-INR    XR Chest 2 View   2. Coronary artery disease of native artery of native heart with stable angina pectoris (CMS/[facility])  Case Request Cath Lab: Left Heart Cath,impella assisted PCI    CBC (No Diff)    Basic Metabolic Panel    Protime-INR    XR Chest 2 View   3. Essential hypertension  Case Request Cath Lab: Left Heart Cath,impella assisted PCI    CBC (No Diff)    Basic Metabolic Panel    Protime-INR    XR Chest 2 View   4. Type 2 diabetes mellitus without complication, without long-term current use of insulin (CMS/[facility])  Case Request Cath Lab: Left Heart Cath,impella assisted PCI    CBC (No Diff)    Basic Metabolic Panel    Protime-INR    XR Chest 2 View   5. Ischemic cardiomyopathy  Case Request Cath Lab: Left Heart Cath,impella assisted PCI    CBC (No Diff)    Basic Metabolic Panel    Protime-INR    XR Chest 2 View   6. Chronic HFrEF (heart failure with reduced ejection fraction) (CMS/[facility])  Case Request Cath Lab: Left Heart  Cath,impella assisted PCI    CBC (No Diff)    Basic Metabolic Panel    Protime-INR    XR Chest 2 View          Plan:         Past Medical History:  Past Medical History:   Diagnosis Date   • Arthritis    • Congestive heart failure (CMS/[facility]) 12/30/2020   • Gout    • Hypertension 9/3/2015   • Sciatica    • Type 2 diabetes mellitus without complication, without long-term current use of insulin (CMS/[facility]) 9/3/2019    glucometer, test strips and lancets sent new onset start metformin DM education encouraged weight loss     Past Surgical History:  Past Surgical History:   Procedure Laterality Date   • CARDIAC CATHETERIZATION N/A 2/22/2021    Procedure: Cardiac Catheterization/Vascular Study;  Surgeon: Kit Jacques MD;  Location: [facility] CATH INVASIVE LOCATION;  Service: Cardiovascular;  Laterality: N/A;      Allergies:  No Known Allergies  Home Meds:  Current Meds:     Current Outpatient Medications:   •  albuterol sulfate  (90 Base) MCG/ACT inhaler, Inhale 2 puffs Every 4 (Four) Hours As Needed for Wheezing., Disp: 18 g, Rfl: 0  •  allopurinol (ZYLOPRIM) 100 MG tablet, Take 1 tablet by mouth Daily., Disp: 90 tablet, Rfl: 0  •  aspirin (aspirin) 81 MG EC tablet, Take 1 tablet by mouth Daily., Disp: 30 tablet, Rfl: 6  •  atorvastatin (Lipitor) 40 MG tablet, Take 1 tablet by mouth Every Night., Disp: 90 tablet, Rfl: 3  •  budesonide-formoterol (Symbicort) 80-4.5 MCG/ACT inhaler, Inhale 2 puffs 2 (Two) Times a Day., Disp: 10.2 g, Rfl: 12  •  metFORMIN (GLUCOPHAGE) 500 MG tablet, TAKE 1 TABLET BY MOUTH TWO  TIMES DAILY WITH MEALS, Disp: 180 tablet, Rfl: 2  •  metoprolol succinate XL (TOPROL-XL) 25 MG 24 hr tablet, Take 1 tablet by mouth Daily., Disp: 90 tablet, Rfl: 3  •  OrthoVisc 30 MG/2ML solution prefilled syringe injection, , Disp: , Rfl:   •  spironolactone (ALDACTONE) 25 MG tablet, TAKE 1 TABLET EVERY DAY, Disp: 90 tablet, Rfl: 3  •  torsemide (DEMADEX) 20 MG tablet, Take 1 tablet by mouth Daily.,  "Disp: 90 tablet, Rfl: 3  •  warfarin (COUMADIN) 5 MG tablet, Take 1 tablet by mouth Every Night. PE  Indications: DVT/PE (active thrombosis), Disp: 90 tablet, Rfl: 1  •  lisinopril (PRINIVIL,ZESTRIL) 2.5 MG tablet, Take 1 tablet by mouth Daily., Disp: 30 tablet, Rfl: 11  Social History:   Social History     Tobacco Use   • Smoking status: Former Smoker     Types: Cigarettes     Quit date: 2020     Years since quittin.4   • Smokeless tobacco: Never Used   • Tobacco comment: quit a month ago   Substance Use Topics   • Alcohol use: No      Family History:  Family History   Problem Relation Age of Onset   • Heart disease Mother    • Heart failure Mother    • Heart disease Sister    • Heart failure Sister    • Heart disease Sister    • Heart failure Sister         The following portions of the patient's history were reviewed and updated as appropriate: allergies, current medications, past family history, past medical history, past social history, past surgical history and problem list.      Review of Systems   Cardiovascular: Positive for leg swelling. Negative for chest pain and palpitations.   Respiratory: Positive for shortness of breath.    Neurological: Negative for dizziness and numbness.     All other systems are negative    Procedures EKG done 2021 reviewed by me shows sinus tach       Objective:     Physical Exam  /74 (BP Location: Left arm, Patient Position: Sitting, Cuff Size: Large Adult)   Pulse 85   Ht 160 cm (63\")   Wt 128 kg (283 lb)   SpO2 99%   BMI 50.13 kg/m²   General:  Appears in no acute distress, pleasant obese, walks with a cane  Eyes: Sclera is anicteric,  conjunctiva is clear   HEENT:  No JVD.  No carotid bruits  Respiratory: Respirations regular and unlabored at rest.  Clear to auscultation  Cardiovascular: S1,S2 Regular rate and rhythm. No murmur, rub or gallop auscultated.   Gastrointestinal: Abdomen nondistended, soft  Extremities: No digital clubbing or cyanosis, no " edema  Skin: Color pink. Skin warm and dry to touch. No rashes  No xanthoma  Neuro: Alert and awake, no lateralizing deficits appreciated    Lab Reviewed:

## 2021-05-21 PROBLEM — I25.5 ISCHEMIC CARDIOMYOPATHY: Status: ACTIVE | Noted: 2021-05-21

## 2021-05-21 PROBLEM — I25.118 CORONARY ARTERY DISEASE OF NATIVE ARTERY OF NATIVE HEART WITH STABLE ANGINA PECTORIS (HCC): Status: ACTIVE | Noted: 2021-05-21

## 2021-05-21 PROBLEM — R06.09 DYSPNEA ON EXERTION: Status: ACTIVE | Noted: 2021-05-21

## 2021-05-21 PROBLEM — I50.22 CHRONIC HFREF (HEART FAILURE WITH REDUCED EJECTION FRACTION): Status: ACTIVE | Noted: 2021-05-21

## 2021-05-23 NOTE — PROGRESS NOTES
Mrs Parks returns for planned follow up. She has dilated cardiomyopathy, single vessel CAD, severe mitral valve regurgitation, and severe tricuspid valve regurgitation. She has multiple severe co-morbidities.    Her medical management is being optimized; she is comfortable now. She clearly states that she does not want any surgical intervention. I do believe that any cardiac surgical intervention would carry an exceedingly high risk.    In the interest of full optimization, she could be considered for stenting to her proximal LAD system. I will speak with Dr. Jacques.

## 2021-05-24 ENCOUNTER — APPOINTMENT (OUTPATIENT)
Dept: GENERAL RADIOLOGY | Facility: HOSPITAL | Age: 71
End: 2021-05-24

## 2021-05-24 ENCOUNTER — TELEPHONE (OUTPATIENT)
Dept: CARDIOLOGY | Facility: CLINIC | Age: 71
End: 2021-05-24

## 2021-05-24 ENCOUNTER — LAB (OUTPATIENT)
Dept: LAB | Facility: HOSPITAL | Age: 71
End: 2021-05-24

## 2021-05-24 ENCOUNTER — ANTICOAGULATION VISIT (OUTPATIENT)
Dept: CARDIOLOGY | Facility: CLINIC | Age: 71
End: 2021-05-24

## 2021-05-24 DIAGNOSIS — E11.9 TYPE 2 DIABETES MELLITUS WITHOUT COMPLICATION, WITHOUT LONG-TERM CURRENT USE OF INSULIN (HCC): ICD-10-CM

## 2021-05-24 DIAGNOSIS — I50.22 CHRONIC HFREF (HEART FAILURE WITH REDUCED EJECTION FRACTION) (HCC): ICD-10-CM

## 2021-05-24 DIAGNOSIS — I10 ESSENTIAL HYPERTENSION: ICD-10-CM

## 2021-05-24 DIAGNOSIS — I25.5 ISCHEMIC CARDIOMYOPATHY: ICD-10-CM

## 2021-05-24 DIAGNOSIS — Z79.01 LONG TERM (CURRENT) USE OF ANTICOAGULANTS: Primary | ICD-10-CM

## 2021-05-24 DIAGNOSIS — R06.09 DYSPNEA ON EXERTION: ICD-10-CM

## 2021-05-24 DIAGNOSIS — I25.118 CORONARY ARTERY DISEASE OF NATIVE ARTERY OF NATIVE HEART WITH STABLE ANGINA PECTORIS (HCC): ICD-10-CM

## 2021-05-24 LAB
ANION GAP SERPL CALCULATED.3IONS-SCNC: 15.3 MMOL/L (ref 5–15)
BUN SERPL-MCNC: 17 MG/DL (ref 8–23)
BUN/CREAT SERPL: 14.8 (ref 7–25)
CALCIUM SPEC-SCNC: 10 MG/DL (ref 8.6–10.5)
CHLORIDE SERPL-SCNC: 102 MMOL/L (ref 98–107)
CO2 SERPL-SCNC: 20.7 MMOL/L (ref 22–29)
CREAT SERPL-MCNC: 1.15 MG/DL (ref 0.57–1)
DEPRECATED RDW RBC AUTO: 42.5 FL (ref 37–54)
ERYTHROCYTE [DISTWIDTH] IN BLOOD BY AUTOMATED COUNT: 13.3 % (ref 12.3–15.4)
GFR SERPL CREATININE-BSD FRML MDRD: 57 ML/MIN/1.73
GLUCOSE SERPL-MCNC: 119 MG/DL (ref 65–99)
HCT VFR BLD AUTO: 34.3 % (ref 34–46.6)
HGB BLD-MCNC: 11.4 G/DL (ref 12–15.9)
INR PPP: 1.11 (ref 0.9–1.1)
INR PPP: 1.11 (ref 2–3)
MCH RBC QN AUTO: 29.5 PG (ref 26.6–33)
MCHC RBC AUTO-ENTMCNC: 33.2 G/DL (ref 31.5–35.7)
MCV RBC AUTO: 88.6 FL (ref 79–97)
PLATELET # BLD AUTO: 276 10*3/MM3 (ref 140–450)
PMV BLD AUTO: 12.3 FL (ref 6–12)
POTASSIUM SERPL-SCNC: 4.4 MMOL/L (ref 3.5–5.2)
PROTHROMBIN TIME: 12.2 SECONDS (ref 19.4–28.5)
RBC # BLD AUTO: 3.87 10*6/MM3 (ref 3.77–5.28)
SODIUM SERPL-SCNC: 138 MMOL/L (ref 136–145)
WBC # BLD AUTO: 10.03 10*3/MM3 (ref 3.4–10.8)

## 2021-05-24 PROCEDURE — 80048 BASIC METABOLIC PNL TOTAL CA: CPT

## 2021-05-24 PROCEDURE — 85610 PROTHROMBIN TIME: CPT

## 2021-05-24 PROCEDURE — 36415 COLL VENOUS BLD VENIPUNCTURE: CPT

## 2021-05-24 PROCEDURE — 85610 PROTHROMBIN TIME: CPT | Performed by: INTERNAL MEDICINE

## 2021-05-24 PROCEDURE — 85027 COMPLETE CBC AUTOMATED: CPT

## 2021-05-24 PROCEDURE — 36416 COLLJ CAPILLARY BLOOD SPEC: CPT | Performed by: INTERNAL MEDICINE

## 2021-05-24 NOTE — TELEPHONE ENCOUNTER
DR. SULLIVAN/ DR. HUNT WANTED HER TO SEE HEMATOLOGY PRIOR TO SURGERY ON WED 5/26/21. HER APT WITH HEMATOLOGY IS NOT UNTIL 6/2/21. SHE NEEDS CALL BACK TO DISCUSS. CAN WE GET HER SOONER APT?

## 2021-05-25 NOTE — TELEPHONE ENCOUNTER
Per Dr. Raymundo, can procedure with procedure tomorrow. Pt made aware and voiced understanding.     15 day supply of Lisinopril called to [facility]

## 2021-05-26 ENCOUNTER — HOSPITAL ENCOUNTER (INPATIENT)
Facility: HOSPITAL | Age: 71
LOS: 1 days | Discharge: HOME | End: 2021-05-27
Attending: INTERNAL MEDICINE | Admitting: INTERNAL MEDICINE

## 2021-05-26 DIAGNOSIS — I10 ESSENTIAL HYPERTENSION: ICD-10-CM

## 2021-05-26 DIAGNOSIS — I25.118 CORONARY ARTERY DISEASE OF NATIVE ARTERY OF NATIVE HEART WITH STABLE ANGINA PECTORIS (HCC): ICD-10-CM

## 2021-05-26 DIAGNOSIS — E11.9 TYPE 2 DIABETES MELLITUS WITHOUT COMPLICATION, WITHOUT LONG-TERM CURRENT USE OF INSULIN (HCC): ICD-10-CM

## 2021-05-26 DIAGNOSIS — R06.09 DYSPNEA ON EXERTION: ICD-10-CM

## 2021-05-26 DIAGNOSIS — I25.5 ISCHEMIC CARDIOMYOPATHY: ICD-10-CM

## 2021-05-26 DIAGNOSIS — I50.22 CHRONIC HFREF (HEART FAILURE WITH REDUCED EJECTION FRACTION) (HCC): ICD-10-CM

## 2021-05-26 LAB
ACT BLD: 180 SECONDS (ref 89–137)
GLUCOSE BLDC GLUCOMTR-MCNC: 93 MG/DL (ref 70–105)
SARS-COV-2 RNA PNL SPEC NAA+PROBE: NOT DETECTED

## 2021-05-26 PROCEDURE — 99152 MOD SED SAME PHYS/QHP 5/>YRS: CPT | Performed by: INTERNAL MEDICINE

## 2021-05-26 PROCEDURE — C1894 INTRO/SHEATH, NON-LASER: HCPCS | Performed by: INTERNAL MEDICINE

## 2021-05-26 PROCEDURE — 027034Z DILATION OF CORONARY ARTERY, ONE ARTERY WITH DRUG-ELUTING INTRALUMINAL DEVICE, PERCUTANEOUS APPROACH: ICD-10-PCS | Performed by: INTERNAL MEDICINE

## 2021-05-26 PROCEDURE — C1769 GUIDE WIRE: HCPCS | Performed by: INTERNAL MEDICINE

## 2021-05-26 PROCEDURE — C1725 CATH, TRANSLUMIN NON-LASER: HCPCS | Performed by: INTERNAL MEDICINE

## 2021-05-26 PROCEDURE — B2151ZZ FLUOROSCOPY OF LEFT HEART USING LOW OSMOLAR CONTRAST: ICD-10-PCS | Performed by: INTERNAL MEDICINE

## 2021-05-26 PROCEDURE — C9600 PERC DRUG-EL COR STENT SING: HCPCS | Performed by: INTERNAL MEDICINE

## 2021-05-26 PROCEDURE — 0 IOPAMIDOL PER 1 ML: Performed by: INTERNAL MEDICINE

## 2021-05-26 PROCEDURE — 87635 SARS-COV-2 COVID-19 AMP PRB: CPT | Performed by: INTERNAL MEDICINE

## 2021-05-26 PROCEDURE — 25010000002 PHENYLEPHRINE 10 MG/ML SOLUTION: Performed by: INTERNAL MEDICINE

## 2021-05-26 PROCEDURE — 33990 INSJ PERQ VAD L HRT ARTERIAL: CPT | Performed by: INTERNAL MEDICINE

## 2021-05-26 PROCEDURE — 94640 AIRWAY INHALATION TREATMENT: CPT

## 2021-05-26 PROCEDURE — 99153 MOD SED SAME PHYS/QHP EA: CPT | Performed by: INTERNAL MEDICINE

## 2021-05-26 PROCEDURE — B2111ZZ FLUOROSCOPY OF MULTIPLE CORONARY ARTERIES USING LOW OSMOLAR CONTRAST: ICD-10-PCS | Performed by: INTERNAL MEDICINE

## 2021-05-26 PROCEDURE — 94799 UNLISTED PULMONARY SVC/PX: CPT

## 2021-05-26 PROCEDURE — 94760 N-INVAS EAR/PLS OXIMETRY 1: CPT

## 2021-05-26 PROCEDURE — 25010000002 FENTANYL CITRATE (PF) 100 MCG/2ML SOLUTION: Performed by: INTERNAL MEDICINE

## 2021-05-26 PROCEDURE — C1874 STENT, COATED/COV W/DEL SYS: HCPCS | Performed by: INTERNAL MEDICINE

## 2021-05-26 PROCEDURE — C1887 CATHETER, GUIDING: HCPCS | Performed by: INTERNAL MEDICINE

## 2021-05-26 PROCEDURE — 02HA3RJ INSERTION OF SHORT-TERM EXTERNAL HEART ASSIST SYSTEM INTO HEART, INTRAOPERATIVE, PERCUTANEOUS APPROACH: ICD-10-PCS | Performed by: INTERNAL MEDICINE

## 2021-05-26 PROCEDURE — C1889 IMPLANT/INSERT DEVICE, NOC: HCPCS | Performed by: INTERNAL MEDICINE

## 2021-05-26 PROCEDURE — 85347 COAGULATION TIME ACTIVATED: CPT

## 2021-05-26 PROCEDURE — 25010000002 MIDAZOLAM PER 1 MG: Performed by: INTERNAL MEDICINE

## 2021-05-26 PROCEDURE — 4A023N7 MEASUREMENT OF CARDIAC SAMPLING AND PRESSURE, LEFT HEART, PERCUTANEOUS APPROACH: ICD-10-PCS | Performed by: INTERNAL MEDICINE

## 2021-05-26 PROCEDURE — 5A0221D ASSISTANCE WITH CARDIAC OUTPUT USING IMPELLER PUMP, CONTINUOUS: ICD-10-PCS | Performed by: INTERNAL MEDICINE

## 2021-05-26 PROCEDURE — 25010000002 HYDROMORPHONE PER 4 MG: Performed by: INTERNAL MEDICINE

## 2021-05-26 PROCEDURE — 92928 PRQ TCAT PLMT NTRAC ST 1 LES: CPT | Performed by: INTERNAL MEDICINE

## 2021-05-26 PROCEDURE — 82962 GLUCOSE BLOOD TEST: CPT

## 2021-05-26 PROCEDURE — 25010000002 HEPARIN (PORCINE) PER 1000 UNITS: Performed by: INTERNAL MEDICINE

## 2021-05-26 DEVICE — XIENCE SIERRA™ EVEROLIMUS ELUTING CORONARY STENT SYSTEM 3.50 MM X 33 MM / RAPID-EXCHANGE
Type: IMPLANTABLE DEVICE | Status: FUNCTIONAL
Brand: XIENCE SIERRA™

## 2021-05-26 RX ORDER — LISINOPRIL 2.5 MG/1
2.5 TABLET ORAL DAILY
Status: DISCONTINUED | OUTPATIENT
Start: 2021-05-27 | End: 2021-05-27 | Stop reason: HOSPADM

## 2021-05-26 RX ORDER — ASPIRIN 81 MG/1
81 TABLET ORAL DAILY
Status: DISCONTINUED | OUTPATIENT
Start: 2021-05-27 | End: 2021-05-27 | Stop reason: HOSPADM

## 2021-05-26 RX ORDER — ASPIRIN 81 MG/1
81 TABLET ORAL DAILY
Status: DISCONTINUED | OUTPATIENT
Start: 2021-05-26 | End: 2021-05-26

## 2021-05-26 RX ORDER — MIDAZOLAM HYDROCHLORIDE 1 MG/ML
INJECTION INTRAMUSCULAR; INTRAVENOUS AS NEEDED
Status: DISCONTINUED | OUTPATIENT
Start: 2021-05-26 | End: 2021-05-26 | Stop reason: HOSPADM

## 2021-05-26 RX ORDER — LIDOCAINE HYDROCHLORIDE 20 MG/ML
INJECTION, SOLUTION INFILTRATION; PERINEURAL AS NEEDED
Status: DISCONTINUED | OUTPATIENT
Start: 2021-05-26 | End: 2021-05-26 | Stop reason: HOSPADM

## 2021-05-26 RX ORDER — TORSEMIDE 10 MG/1
20 TABLET ORAL DAILY
Status: DISCONTINUED | OUTPATIENT
Start: 2021-05-27 | End: 2021-05-27 | Stop reason: HOSPADM

## 2021-05-26 RX ORDER — ALBUTEROL SULFATE 2.5 MG/3ML
2.5 SOLUTION RESPIRATORY (INHALATION) EVERY 6 HOURS PRN
Status: DISCONTINUED | OUTPATIENT
Start: 2021-05-26 | End: 2021-05-27 | Stop reason: HOSPADM

## 2021-05-26 RX ORDER — PHENYLEPHRINE HYDROCHLORIDE 10 MG/ML
INJECTION INTRAVENOUS AS NEEDED
Status: DISCONTINUED | OUTPATIENT
Start: 2021-05-26 | End: 2021-05-26 | Stop reason: HOSPADM

## 2021-05-26 RX ORDER — METOPROLOL SUCCINATE 25 MG/1
25 TABLET, EXTENDED RELEASE ORAL DAILY
Status: DISCONTINUED | OUTPATIENT
Start: 2021-05-26 | End: 2021-05-27 | Stop reason: HOSPADM

## 2021-05-26 RX ORDER — ATORVASTATIN CALCIUM 40 MG/1
40 TABLET, FILM COATED ORAL NIGHTLY
Status: DISCONTINUED | OUTPATIENT
Start: 2021-05-26 | End: 2021-05-27 | Stop reason: HOSPADM

## 2021-05-26 RX ORDER — HYDROMORPHONE HCL 110MG/55ML
1 PATIENT CONTROLLED ANALGESIA SYRINGE INTRAVENOUS AS NEEDED
Status: DISCONTINUED | OUTPATIENT
Start: 2021-05-26 | End: 2021-05-27 | Stop reason: HOSPADM

## 2021-05-26 RX ORDER — ACETAMINOPHEN 325 MG/1
650 TABLET ORAL EVERY 4 HOURS PRN
Status: DISCONTINUED | OUTPATIENT
Start: 2021-05-26 | End: 2021-05-27 | Stop reason: HOSPADM

## 2021-05-26 RX ORDER — SODIUM CHLORIDE 9 MG/ML
INJECTION, SOLUTION INTRAVENOUS CONTINUOUS PRN
Status: COMPLETED | OUTPATIENT
Start: 2021-05-26 | End: 2021-05-26

## 2021-05-26 RX ORDER — SODIUM CHLORIDE 9 MG/ML
100 INJECTION, SOLUTION INTRAVENOUS CONTINUOUS
Status: DISCONTINUED | OUTPATIENT
Start: 2021-05-26 | End: 2021-05-27 | Stop reason: HOSPADM

## 2021-05-26 RX ORDER — HEPARIN SODIUM 1000 [USP'U]/ML
INJECTION, SOLUTION INTRAVENOUS; SUBCUTANEOUS AS NEEDED
Status: DISCONTINUED | OUTPATIENT
Start: 2021-05-26 | End: 2021-05-26 | Stop reason: HOSPADM

## 2021-05-26 RX ORDER — NITROGLYCERIN 5 MG/ML
INJECTION, SOLUTION INTRAVENOUS AS NEEDED
Status: DISCONTINUED | OUTPATIENT
Start: 2021-05-26 | End: 2021-05-26 | Stop reason: HOSPADM

## 2021-05-26 RX ORDER — FENTANYL CITRATE 50 UG/ML
INJECTION, SOLUTION INTRAMUSCULAR; INTRAVENOUS AS NEEDED
Status: DISCONTINUED | OUTPATIENT
Start: 2021-05-26 | End: 2021-05-26 | Stop reason: HOSPADM

## 2021-05-26 RX ORDER — BUDESONIDE AND FORMOTEROL FUMARATE DIHYDRATE 80; 4.5 UG/1; UG/1
2 AEROSOL RESPIRATORY (INHALATION)
Status: DISCONTINUED | OUTPATIENT
Start: 2021-05-26 | End: 2021-05-27 | Stop reason: HOSPADM

## 2021-05-26 RX ORDER — SPIRONOLACTONE 25 MG/1
25 TABLET ORAL DAILY
Status: DISCONTINUED | OUTPATIENT
Start: 2021-05-27 | End: 2021-05-27 | Stop reason: HOSPADM

## 2021-05-26 RX ORDER — ALLOPURINOL 100 MG/1
100 TABLET ORAL DAILY
Status: DISCONTINUED | OUTPATIENT
Start: 2021-05-27 | End: 2021-05-27 | Stop reason: HOSPADM

## 2021-05-26 RX ADMIN — ATORVASTATIN CALCIUM 40 MG: 40 TABLET, FILM COATED ORAL at 20:40

## 2021-05-26 RX ADMIN — BUDESONIDE AND FORMOTEROL FUMARATE DIHYDRATE 2 PUFF: 80; 4.5 AEROSOL RESPIRATORY (INHALATION) at 18:53

## 2021-05-26 RX ADMIN — HYDROMORPHONE HYDROCHLORIDE 1 MG: 2 INJECTION, SOLUTION INTRAMUSCULAR; INTRAVENOUS; SUBCUTANEOUS at 16:33

## 2021-05-26 RX ADMIN — METOPROLOL SUCCINATE 25 MG: 25 TABLET, EXTENDED RELEASE ORAL at 20:40

## 2021-05-27 ENCOUNTER — TELEPHONE (OUTPATIENT)
Dept: CARDIOLOGY | Facility: CLINIC | Age: 71
End: 2021-05-27

## 2021-05-27 ENCOUNTER — READMISSION MANAGEMENT (OUTPATIENT)
Dept: CALL CENTER | Facility: HOSPITAL | Age: 71
End: 2021-05-27

## 2021-05-27 VITALS
BODY MASS INDEX: 50.63 KG/M2 | TEMPERATURE: 97.2 F | RESPIRATION RATE: 18 BRPM | OXYGEN SATURATION: 97 % | WEIGHT: 275.13 LBS | HEART RATE: 88 BPM | SYSTOLIC BLOOD PRESSURE: 126 MMHG | DIASTOLIC BLOOD PRESSURE: 73 MMHG | HEIGHT: 62 IN

## 2021-05-27 LAB
ANION GAP SERPL CALCULATED.3IONS-SCNC: 10 MMOL/L (ref 5–15)
BUN SERPL-MCNC: 16 MG/DL (ref 8–23)
BUN/CREAT SERPL: 15.2 (ref 7–25)
CALCIUM SPEC-SCNC: 9.1 MG/DL (ref 8.6–10.5)
CHLORIDE SERPL-SCNC: 103 MMOL/L (ref 98–107)
CO2 SERPL-SCNC: 27 MMOL/L (ref 22–29)
CREAT SERPL-MCNC: 1.05 MG/DL (ref 0.57–1)
DEPRECATED RDW RBC AUTO: 47.7 FL (ref 37–54)
ERYTHROCYTE [DISTWIDTH] IN BLOOD BY AUTOMATED COUNT: 15.1 % (ref 12.3–15.4)
GFR SERPL CREATININE-BSD FRML MDRD: 63 ML/MIN/1.73
GLUCOSE SERPL-MCNC: 96 MG/DL (ref 65–99)
HCT VFR BLD AUTO: 30.4 % (ref 34–46.6)
HGB BLD-MCNC: 10 G/DL (ref 12–15.9)
MCH RBC QN AUTO: 29.9 PG (ref 26.6–33)
MCHC RBC AUTO-ENTMCNC: 32.8 G/DL (ref 31.5–35.7)
MCV RBC AUTO: 91.1 FL (ref 79–97)
PLATELET # BLD AUTO: 203 10*3/MM3 (ref 140–450)
PMV BLD AUTO: 9.9 FL (ref 6–12)
POTASSIUM SERPL-SCNC: 4.3 MMOL/L (ref 3.5–5.2)
RBC # BLD AUTO: 3.34 10*6/MM3 (ref 3.77–5.28)
SODIUM SERPL-SCNC: 140 MMOL/L (ref 136–145)
WBC # BLD AUTO: 9.5 10*3/MM3 (ref 3.4–10.8)

## 2021-05-27 PROCEDURE — 99238 HOSP IP/OBS DSCHRG MGMT 30/<: CPT | Performed by: INTERNAL MEDICINE

## 2021-05-27 PROCEDURE — 93005 ELECTROCARDIOGRAM TRACING: CPT | Performed by: INTERNAL MEDICINE

## 2021-05-27 PROCEDURE — 85027 COMPLETE CBC AUTOMATED: CPT | Performed by: INTERNAL MEDICINE

## 2021-05-27 PROCEDURE — 94799 UNLISTED PULMONARY SVC/PX: CPT

## 2021-05-27 PROCEDURE — 93010 ELECTROCARDIOGRAM REPORT: CPT | Performed by: INTERNAL MEDICINE

## 2021-05-27 PROCEDURE — 80048 BASIC METABOLIC PNL TOTAL CA: CPT | Performed by: INTERNAL MEDICINE

## 2021-05-27 RX ADMIN — ALLOPURINOL 100 MG: 100 TABLET ORAL at 08:46

## 2021-05-27 RX ADMIN — TORSEMIDE 20 MG: 10 TABLET ORAL at 08:46

## 2021-05-27 RX ADMIN — TICAGRELOR 90 MG: 90 TABLET ORAL at 08:46

## 2021-05-27 RX ADMIN — SPIRONOLACTONE 25 MG: 25 TABLET ORAL at 08:45

## 2021-05-27 RX ADMIN — LISINOPRIL 2.5 MG: 2.5 TABLET ORAL at 08:46

## 2021-05-27 RX ADMIN — ASPIRIN 81 MG: 81 TABLET, COATED ORAL at 08:46

## 2021-05-27 RX ADMIN — METOPROLOL SUCCINATE 25 MG: 25 TABLET, EXTENDED RELEASE ORAL at 08:45

## 2021-05-27 RX ADMIN — BUDESONIDE AND FORMOTEROL FUMARATE DIHYDRATE 2 PUFF: 80; 4.5 AEROSOL RESPIRATORY (INHALATION) at 07:05

## 2021-05-27 NOTE — TELEPHONE ENCOUNTER
[facility] DOES NOT HAVE BR[facility] AND IT IS GOING TO BE TOO EXPENSIVE. CAN WE CALL SOMETHING ELSE IN TO [facility] [address]?  SHE IS DR. SULLIVAN PATIENT, BUT DR. HUNT DID PROCEDURE ON HER THIS MORNING AND STARTED HER ON BRILINTA.

## 2021-05-27 NOTE — OUTREACH NOTE
Prep Survey      Responses   [facility] discharged from?  [facility]   Is LACE score < 7 ?  No   Emergency Room discharge w/ pulse ox?  No   Eligibility  [facility]   Date of Admission  05/26/21   Date of Discharge  05/27/21   Discharge Disposition  Home or Self Care   Discharge diagnosis  CARDIAC CATHETERIZATION  Dyspnea on exertion possibly anginal equivalent in a diabetic patient   Does the patient have one of the following disease processes/diagnoses(primary or secondary)?  Other   Does the patient have Home health ordered?  No   Is there a DME ordered?  No   Prep survey completed?  Yes          Brenda Simon RN

## 2021-05-28 ENCOUNTER — TRANSITIONAL CARE MANAGEMENT TELEPHONE ENCOUNTER (OUTPATIENT)
Dept: CALL CENTER | Facility: HOSPITAL | Age: 71
End: 2021-05-28

## 2021-05-28 NOTE — OUTREACH NOTE
Call Center TCM Note      Responses   [facility] patient discharged from?  [facility]   Does the patient have one of the following disease processes/diagnoses(primary or secondary)?  Other   TCM attempt successful?  No   Unsuccessful attempts  Attempt 1   Revoked Reason  Other   Call Status Comments  Person who answered phone hung up after nurse asked for Ms. Charly Talbert, RN    5/28/2021, 13:30 EDT

## 2021-06-01 ENCOUNTER — TELEPHONE (OUTPATIENT)
Dept: CARDIAC REHAB | Facility: HOSPITAL | Age: 71
End: 2021-06-01

## 2021-06-01 NOTE — TELEPHONE ENCOUNTER
Followed up with patient. Advised her to call her PCP office regarding her Metformin prescription. Said she is not interested in doing rehab at this time.

## 2021-06-02 ENCOUNTER — CONSULT (OUTPATIENT)
Dept: ONCOLOGY | Facility: CLINIC | Age: 71
End: 2021-06-02

## 2021-06-02 ENCOUNTER — LAB (OUTPATIENT)
Dept: LAB | Facility: HOSPITAL | Age: 71
End: 2021-06-02

## 2021-06-02 VITALS
TEMPERATURE: 97.3 F | DIASTOLIC BLOOD PRESSURE: 73 MMHG | HEIGHT: 63 IN | RESPIRATION RATE: 18 BRPM | WEIGHT: 265.4 LBS | HEART RATE: 110 BPM | BODY MASS INDEX: 47.02 KG/M2 | SYSTOLIC BLOOD PRESSURE: 161 MMHG

## 2021-06-02 DIAGNOSIS — D50.9 IRON DEFICIENCY ANEMIA, UNSPECIFIED IRON DEFICIENCY ANEMIA TYPE: Primary | ICD-10-CM

## 2021-06-02 LAB
FERRITIN SERPL-MCNC: 267.2 NG/ML (ref 13–150)
IRON 24H UR-MRATE: 36 MCG/DL (ref 37–145)
IRON SATN MFR SERPL: 11 % (ref 20–50)
TIBC SERPL-MCNC: 322 MCG/DL (ref 298–536)
TRANSFERRIN SERPL-MCNC: 216 MG/DL (ref 200–360)

## 2021-06-02 PROCEDURE — 82728 ASSAY OF FERRITIN: CPT | Performed by: INTERNAL MEDICINE

## 2021-06-02 PROCEDURE — 36415 COLL VENOUS BLD VENIPUNCTURE: CPT | Performed by: INTERNAL MEDICINE

## 2021-06-02 PROCEDURE — 99204 OFFICE O/P NEW MOD 45 MIN: CPT | Performed by: INTERNAL MEDICINE

## 2021-06-02 PROCEDURE — 84466 ASSAY OF TRANSFERRIN: CPT | Performed by: INTERNAL MEDICINE

## 2021-06-02 PROCEDURE — 83540 ASSAY OF IRON: CPT | Performed by: INTERNAL MEDICINE

## 2021-06-02 NOTE — PROGRESS NOTES
HEMATOLOGY ONCOLOGY OUTPATIENT CONSULTATION       Patient name: Jeanna Parks  : 1950  MRN: [id number]  Primary Care Physician: Latanya Hawk MD  Referring Physician: Latanya Hawk*  Reason For Consult:     Chief Complaint   Patient presents with   • Appointment     Long-term anticoagulant use        HPI:   History of Present Illness:  Jeanna Parks is 70 y.o. female who presented to our office on 21 for consultation regarding  Pulmonary embolism.   Patient is a 70-year-old female with history of acute nonprovoked pulmonary embolism.  Patient was admitted to the hospital in 2021 where a CT PE showed bilateral pulmonary embolism and distal right pulmonary artery extending into all lobes.  Also seen the segmental and subsegmental left upper lobe and lower lobe pulmonary arteries.  Patient was discharged on Lovenox bridging to Coumadin.  During the hospitalization patient also noted to have severe coronary artery disease with proximal LAD lesion severe pulmonary hypertension, valvular disease  Patient again had a cardiac cath in May 2021 with PCI and was started on Brilinta.  Aspirin was eventually stopped and now patient is on Brilinta and Coumadin.  Patient is tolerating antiplatelet anticoagulation well no significant bleeding concerns.  No blood in stools or dark stools.    Subjective:  • 21.  Patient presents for initial consultation .  Hemoglobin 10, iron saturation 11    The following portions of the patient's history were reviewed and updated as appropriate: allergies, current medications, past family history, past medical history, past social history, past surgical history and problem list.    Past Medical History:   Diagnosis Date   • Arthritis    • Congestive heart failure (CMS/HCC) 2020   • Gout    • Hypertension 9/3/2015   • Sciatica    • Type 2 diabetes mellitus without complication, without long-term current use of  insulin (CMS/[facility]) 9/3/2019    glucometer, test strips and lancets sent new onset start metformin DM education encouraged weight loss       Past Surgical History:   Procedure Laterality Date   • CARDIAC CATHETERIZATION N/A 2/22/2021    Procedure: Cardiac Catheterization/Vascular Study;  Surgeon: Kit Jacques MD;  Location: [facility] CATH INVASIVE LOCATION;  Service: Cardiovascular;  Laterality: N/A;   • CARDIAC CATHETERIZATION N/A 5/26/2021    Procedure: Left Heart Cath,impella assisted PCI;  Surgeon: Itz Raymundo MD;  Location: [facility] CATH INVASIVE LOCATION;  Service: Cardiovascular;  Laterality: N/A;   • CARDIAC CATHETERIZATION N/A 5/26/2021    Procedure: Stent HADLEY coronary;  Surgeon: Itz Raymundo MD;  Location: [facility] CATH INVASIVE LOCATION;  Service: Cardiovascular;  Laterality: N/A;   • CORONARY ANGIOPLASTY  05/26/2021    1x stent to LAD         Current Outpatient Medications:   •  albuterol sulfate  (90 Base) MCG/ACT inhaler, Inhale 2 puffs Every 4 (Four) Hours As Needed for Wheezing., Disp: 18 g, Rfl: 0  •  allopurinol (ZYLOPRIM) 100 MG tablet, Take 1 tablet by mouth Daily., Disp: 90 tablet, Rfl: 0  •  aspirin (aspirin) 81 MG EC tablet, Take 1 tablet by mouth Daily., Disp: 30 tablet, Rfl: 6  •  atorvastatin (Lipitor) 40 MG tablet, Take 1 tablet by mouth Every Night., Disp: 90 tablet, Rfl: 3  •  budesonide-formoterol (Symbicort) 80-4.5 MCG/ACT inhaler, Inhale 2 puffs 2 (Two) Times a Day., Disp: 10.2 g, Rfl: 12  •  lisinopril (PRINIVIL,ZESTRIL) 2.5 MG tablet, Take 1 tablet by mouth Daily., Disp: 30 tablet, Rfl: 11  •  metFORMIN (GLUCOPHAGE) 500 MG tablet, TAKE 1 TABLET BY MOUTH TWO  TIMES DAILY WITH MEALS, Disp: 180 tablet, Rfl: 2  •  metoprolol succinate XL (TOPROL-XL) 25 MG 24 hr tablet, Take 1 tablet by mouth Daily., Disp: 90 tablet, Rfl: 3  •  spironolactone (ALDACTONE) 25 MG tablet, TAKE 1 TABLET EVERY DAY, Disp: 90 tablet, Rfl: 3  •  ticagrelor (BRILINTA) 90 MG  tablet tablet, Take 1 tablet by mouth 2 (Two) Times a Day., Disp: 180 tablet, Rfl: 3  •  torsemide (DEMADEX) 20 MG tablet, Take 1 tablet by mouth Daily., Disp: 90 tablet, Rfl: 3  •  warfarin (COUMADIN) 5 MG tablet, Take 1 tablet by mouth Every Night. PE  Indications: DVT/PE (active thrombosis), Disp: 90 tablet, Rfl: 1    Current outpatient and discharge medications have been reconciled for the patient.  Reviewed by: Amado Falcon MD    No Known Allergies    Family History   Problem Relation Age of Onset   • Heart disease Mother    • Heart failure Mother    • Heart disease Sister    • Heart failure Sister    • Heart disease Sister    • Heart failure Sister        Cancer-related family history is not on file.    Social History     Tobacco Use   • Smoking status: Former Smoker     Types: Cigarettes     Quit date: 2020     Years since quittin.5   • Smokeless tobacco: Never Used   • Tobacco comment: quit a month ago   Vaping Use   • Vaping Use: Never used   Substance Use Topics   • Alcohol use: No   • Drug use: No     Social History     Social History Narrative   • Not on file        ROS:     Review of Systems   Constitutional: Negative for fatigue and fever.   HENT: Negative for congestion and nosebleeds.    Eyes: Negative for pain.   Respiratory: Negative for cough and shortness of breath.    Cardiovascular: Negative for chest pain.   Gastrointestinal: Negative for abdominal pain, blood in stool, diarrhea, nausea and vomiting.   Endocrine: Negative for cold intolerance and heat intolerance.   Genitourinary: Negative for difficulty urinating.   Musculoskeletal: Negative for arthralgias.   Skin: Negative for rash.   Neurological: Negative for dizziness and headaches.   Hematological: Does not bruise/bleed easily.   Psychiatric/Behavioral: Negative for behavioral problems.       Objective:    Vitals:    21 1102   BP: 161/73   Pulse: 110   Resp: 18   Temp: 97.3 °F (36.3 °C)   Weight: 120 kg (265 lb 6.4 oz)  "  Height: 160 cm (63\")   PainSc: 0-No pain     Body mass index is 47.01 kg/m².      Physical Exam:     Physical Exam  Constitutional:       Appearance: Normal appearance.   HENT:      Head: Normocephalic and atraumatic.   Eyes:      Pupils: Pupils are equal, round, and reactive to light.   Cardiovascular:      Rate and Rhythm: Normal rate and regular rhythm.      Pulses: Normal pulses.      Heart sounds: No murmur heard.     Pulmonary:      Effort: Pulmonary effort is normal.      Breath sounds: Normal breath sounds.   Abdominal:      General: There is no distension.      Palpations: Abdomen is soft. There is no mass.      Tenderness: There is no abdominal tenderness.   Musculoskeletal:         General: Normal range of motion.      Cervical back: Normal range of motion.   Skin:     General: Skin is warm.   Neurological:      General: No focal deficit present.      Mental Status: She is alert.   Psychiatric:         Mood and Affect: Mood normal.         Lab Results - Last 18 Months   Lab Units 05/27/21 0223 05/24/21  1010 03/02/21  0138   WBC 10*3/mm3 9.50 10.03 8.80   HEMOGLOBIN g/dL 10.0* 11.4* 11.8*   HEMATOCRIT % 30.4* 34.3 36.2   PLATELETS 10*3/mm3 203 276 196   MCV fL 91.1 88.6 90.6     Lab Results - Last 18 Months   Lab Units 05/27/21  0223 05/24/21  1010 03/02/21  0138 12/30/20  1351 04/02/20  1016   SODIUM mmol/L 140 138 136 140 138   POTASSIUM mmol/L 4.3 4.4 4.0 3.9 4.5   CHLORIDE mmol/L 103 102 104 105 101   CO2 mmol/L 27.0 20.7* 23.0 24.0 23.0   BUN mg/dL 16 17 16 12 10   CREATININE mg/dL 1.05* 1.15* 1.00 0.91 0.85   CALCIUM mg/dL 9.1 10.0 9.1 9.2 9.8   BILIRUBIN mg/dL  --   --   --  0.4 0.2   ALK PHOS U/L  --   --   --  97 103   ALT (SGPT) U/L  --   --   --  20 11   AST (SGOT) U/L  --   --   --  20 13   GLUCOSE mg/dL 96 119* 107* 130* 132*       Lab Results   Component Value Date    GLUCOSE 96 05/27/2021    BUN 16 05/27/2021    CREATININE 1.05 (H) 05/27/2021    EGFRIFAFRI 63 05/27/2021    BCR 15.2 " 05/27/2021    K 4.3 05/27/2021    CO2 27.0 05/27/2021    CALCIUM 9.1 05/27/2021    ALBUMIN 3.40 (L) 03/02/2021    AST 20 12/30/2020    ALT 20 12/30/2020       Lab Results - Last 18 Months   Lab Units 05/24/21  1010 05/24/21  0000 04/23/21  1141 03/02/21  0738 03/02/21  0138 03/01/21  1813 03/01/21  1033   INR  1.11* 1.11* 2.00*  --  1.04*  --    < >   APTT seconds  --   --   --  52.8* 64.7 91.4*  --     < > = values in this interval not displayed.       Lab Results   Component Value Date    IRON 36 (L) 06/02/2021    TIBC 322 06/02/2021    FERRITIN 267.20 (H) 06/02/2021         Lab Results   Component Value Date    PTT 52.8 (L) 03/02/2021    INR 1.11 (L) 05/24/2021         Assessment/Plan     Patient is a 70-year-old female with unprovoked pulmonary embolism on anticoagulation with Coumadin, severe coronary artery disease status post PCI on Brilinta.    Pulmonary embolism  There were no clear provoking factors for her diagnosis of PE.  Discussed with the patient that with unprovoked pulmonary embolism recommendation would be indefinite anticoagulation.  We will evaluate again on follow-up to see if patient would be an ongoing candidate for anticoagulation.  If she does not have any significant bleeding, threshold for stopping anticoagulation is high.  She would be at high risk for repeat VTE if she is taken off anticoagulation.  We can reassess her repeat clotting risk on follow-up with [id number] which based on her BMI, age would still likely be high keep putting her at high risk for repeat thrombosis.     I do not believe getting any hypercoagulable studies would change our management.  She has no family history of blood clots, with her high BMI, age, smoking history she has enough risk factors for venous thromboembolism.     If our plan in the future is to get her off anticoagulation we can check antiphospholipid syndrome, factor V Leiden, prothrombin gene mutation, protein S, protein C to assess repeat risk of  thrombosis.    Patient has quit smoking which will help reduce her risk factors, I counseled her on weight loss which would also help reduce her risk of repeat thrombosis.  For now she will continue on Coumadin with regular INR monitoring, continue Brilinta which she will need for 1 year per cardiology with drug eluting stent.      Anemia  Patient has iron deficiency anemia with low iron saturation of 11.  I would set her up for IV iron infusion her hemoglobin is down to 10.  Has been intolerant not responded to oral iron in the past.    Hypertension  Blood systolic blood pressure initially at 190s down to 160s on repeat.  Continues to be on antihypertensives.  Advised to make a log discuss with cardiology/primary care    Plan  Continue anticoagulation with Coumadin,  Continue antiplatelet therapy with Brilinta as per cardiology  Schedule for IV Injectafer for 2 doses.  Blood pressure monitoring    Follow-up in 3 months repeat CBC and iron studies        Thank you very much for providing the opportunity to participate in this patient’s care. Please do not hesitate to call if there are any other questions    I have reviewed and confirmed the accuracy of the patient's history: Chief complaint, HPI, ROS, Subjective and Past Family Social History as entered by the MA/KAYLEIGH/RN. Amado Falcon MD 06/02/21       Electronically signed by Camilla Prince MA, 06/02/21, 11:01 AM EDT.

## 2021-06-07 ENCOUNTER — READMISSION MANAGEMENT (OUTPATIENT)
Dept: CALL CENTER | Facility: HOSPITAL | Age: 71
End: 2021-06-07

## 2021-06-07 NOTE — OUTREACH NOTE
Medical Week 2 Survey      Responses   [facility] patient discharged from?  [facility]   Does the patient have one of the following disease processes/diagnoses(primary or secondary)?  Other   Week 2 attempt successful?  Yes   Call start time  1718   Discharge diagnosis  CARDIAC CATHETERIZATION  Dyspnea on exertion possibly anginal equivalent in a diabetic patient   Call end time  1724   Meds reviewed with patient/caregiver?  Yes   Is the patient having any side effects they believe may be caused by any medication additions or changes?  No   Does the patient have all medications ordered at discharge?  No   What is keeping the patient from filling the prescriptions?  Financial   Nursing Interventions  No intervention needed   Notified Case Management  Financial   Prescription comments  Cop[employer] Card given and will have it shipped from [place]   Is the patient taking all medications as directed (includes completed medication regime)?  Yes   Does the patient have a primary care provider?   Yes   Does the patient have an appointment with their PCP within 7 days of discharge?  Yes   Has the patient kept scheduled appointments due by today?  Yes   Has home health visited the patient within 72 hours of discharge?  N/A   Psychosocial issues?  No   Did the patient receive a copy of their discharge instructions?  Yes   Nursing interventions  Reviewed instructions with patient   What is the patient's perception of their health status since discharge?  Improving   Is the patient/caregiver able to teach back signs and symptoms related to disease process for when to call PCP?  Yes   Is the patient/caregiver able to teach back signs and symptoms related to disease process for when to call 911?  Yes   Is the patient/caregiver able to teach back the hierarchy of who to call/visit for symptoms/problems? PCP, Specialist, Home health nurse, Urgent Care, ED, 911  Yes   If the patient is a current smoker, are they able to teach back  resources for cessation?  Not a smoker   Additional teach back comments  Breathing is better but issues over the weekend. RX's are confusing with meds from two MDs   Week 2 Call Completed?  Yes   Wrap up additional comments  She is improving. Encouraged daily weights.           Caridad Banegas RN

## 2021-06-10 LAB — QT INTERVAL: 422 MS

## 2021-06-15 ENCOUNTER — READMISSION MANAGEMENT (OUTPATIENT)
Dept: CALL CENTER | Facility: HOSPITAL | Age: 71
End: 2021-06-15

## 2021-06-15 PROBLEM — I26.99 PULMONARY EMBOLISM: Status: ACTIVE | Noted: 2021-06-15

## 2021-06-15 PROBLEM — D50.9 IRON DEFICIENCY ANEMIA: Status: ACTIVE | Noted: 2021-06-15

## 2021-06-15 NOTE — OUTREACH NOTE
Medical Week 3 Survey      Responses   [facility] patient discharged from?  [facility]   Does the patient have one of the following disease processes/diagnoses(primary or secondary)?  Other   Week 3 attempt successful?  Yes   Call start time  1448   Call end time  1453   Discharge diagnosis  CARDIAC CATHETERIZATION  Dyspnea on exertion possibly anginal equivalent in a diabetic patient   Is the patient taking all medications as directed (includes completed medication regime)?  Yes   Does the patient have a primary care provider?   Yes   Does the patient have an appointment with their PCP within 7 days of discharge?  Greater than 7 days   Has the patient kept scheduled appointments due by today?  Yes   Comments  Pt has appt with Dr. Hawk for 7/16 and advised to make a sooner appt.  Has appt with Dr. Raymundo for 6/21   Has home health visited the patient within 72 hours of discharge?  N/A   Psychosocial issues?  No   What is the patient's perception of their health status since discharge?  Improving   Is the patient/caregiver able to teach back signs and symptoms related to disease process for when to call PCP?  Yes   Is the patient/caregiver able to teach back signs and symptoms related to disease process for when to call 911?  Yes   Is the patient/caregiver able to teach back the hierarchy of who to call/visit for symptoms/problems? PCP, Specialist, Home health nurse, Urgent Care, ED, 911  Yes   If the patient is a current smoker, are they able to teach back resources for cessation?  Not a smoker   Additional teach back comments  States she is doing well.  Advised to make a sooner appt with Dr. Hawk due to appt is not til 7/16.    Week 3 Call Completed?  Yes   Wrap up additional comments  Pt to make sooner appt with PCP          Charley Garcia LPN

## 2021-06-21 ENCOUNTER — OFFICE VISIT (OUTPATIENT)
Dept: CARDIOLOGY | Facility: CLINIC | Age: 71
End: 2021-06-21

## 2021-06-21 VITALS
SYSTOLIC BLOOD PRESSURE: 133 MMHG | BODY MASS INDEX: 48.37 KG/M2 | HEIGHT: 63 IN | HEART RATE: 102 BPM | OXYGEN SATURATION: 98 % | WEIGHT: 273 LBS | DIASTOLIC BLOOD PRESSURE: 84 MMHG

## 2021-06-21 DIAGNOSIS — Z98.61 CAD S/P PERCUTANEOUS CORONARY ANGIOPLASTY: Primary | ICD-10-CM

## 2021-06-21 DIAGNOSIS — I07.1 SEVERE TRICUSPID REGURGITATION: ICD-10-CM

## 2021-06-21 DIAGNOSIS — E66.01 MORBID OBESITY WITH BMI OF 45.0-49.9, ADULT (HCC): ICD-10-CM

## 2021-06-21 DIAGNOSIS — I25.10 CAD S/P PERCUTANEOUS CORONARY ANGIOPLASTY: Primary | ICD-10-CM

## 2021-06-21 DIAGNOSIS — I50.22 CHRONIC HFREF (HEART FAILURE WITH REDUCED EJECTION FRACTION) (HCC): ICD-10-CM

## 2021-06-21 DIAGNOSIS — E11.9 TYPE 2 DIABETES MELLITUS WITHOUT COMPLICATION, WITHOUT LONG-TERM CURRENT USE OF INSULIN (HCC): ICD-10-CM

## 2021-06-21 DIAGNOSIS — I34.0 SEVERE MITRAL REGURGITATION: ICD-10-CM

## 2021-06-21 DIAGNOSIS — I10 ESSENTIAL HYPERTENSION: ICD-10-CM

## 2021-06-21 PROCEDURE — 99214 OFFICE O/P EST MOD 30 MIN: CPT | Performed by: INTERNAL MEDICINE

## 2021-06-21 PROCEDURE — 93000 ELECTROCARDIOGRAM COMPLETE: CPT | Performed by: INTERNAL MEDICINE

## 2021-06-22 ENCOUNTER — TELEPHONE (OUTPATIENT)
Dept: CARDIOLOGY | Facility: CLINIC | Age: 71
End: 2021-06-22

## 2021-06-22 NOTE — TELEPHONE ENCOUNTER
Form from [employer] received. Patient contacted and states that she has enrolled in this program and that I can complete the form and send it in.

## 2021-06-23 ENCOUNTER — READMISSION MANAGEMENT (OUTPATIENT)
Dept: CALL CENTER | Facility: HOSPITAL | Age: 71
End: 2021-06-23

## 2021-07-16 ENCOUNTER — LAB (OUTPATIENT)
Dept: FAMILY MEDICINE CLINIC | Facility: CLINIC | Age: 71
End: 2021-07-16

## 2021-07-16 ENCOUNTER — ANTICOAGULATION VISIT (OUTPATIENT)
Dept: FAMILY MEDICINE CLINIC | Facility: CLINIC | Age: 71
End: 2021-07-16

## 2021-07-16 ENCOUNTER — OFFICE VISIT (OUTPATIENT)
Dept: FAMILY MEDICINE CLINIC | Facility: CLINIC | Age: 71
End: 2021-07-16

## 2021-07-16 ENCOUNTER — TELEPHONE (OUTPATIENT)
Dept: CARDIOLOGY | Facility: CLINIC | Age: 71
End: 2021-07-16

## 2021-07-16 VITALS
HEIGHT: 63 IN | BODY MASS INDEX: 47.66 KG/M2 | SYSTOLIC BLOOD PRESSURE: 107 MMHG | DIASTOLIC BLOOD PRESSURE: 74 MMHG | OXYGEN SATURATION: 98 % | WEIGHT: 269 LBS | TEMPERATURE: 97.5 F | HEART RATE: 106 BPM

## 2021-07-16 DIAGNOSIS — I10 ESSENTIAL HYPERTENSION: Chronic | ICD-10-CM

## 2021-07-16 DIAGNOSIS — E11.9 TYPE 2 DIABETES MELLITUS WITHOUT COMPLICATION, WITHOUT LONG-TERM CURRENT USE OF INSULIN (HCC): Primary | ICD-10-CM

## 2021-07-16 DIAGNOSIS — I26.09 ACUTE PULMONARY EMBOLISM WITH ACUTE COR PULMONALE, UNSPECIFIED PULMONARY EMBOLISM TYPE (HCC): ICD-10-CM

## 2021-07-16 DIAGNOSIS — Z79.01 LONG TERM (CURRENT) USE OF ANTICOAGULANTS: Primary | ICD-10-CM

## 2021-07-16 LAB
HBA1C MFR BLD: 5.1 % (ref 3.5–5.6)
INR PPP: 1.6 (ref 2–3)

## 2021-07-16 PROCEDURE — 36415 COLL VENOUS BLD VENIPUNCTURE: CPT | Performed by: FAMILY MEDICINE

## 2021-07-16 PROCEDURE — 99213 OFFICE O/P EST LOW 20 MIN: CPT | Performed by: FAMILY MEDICINE

## 2021-07-16 PROCEDURE — 36416 COLLJ CAPILLARY BLOOD SPEC: CPT | Performed by: FAMILY MEDICINE

## 2021-07-16 PROCEDURE — 83036 HEMOGLOBIN GLYCOSYLATED A1C: CPT | Performed by: FAMILY MEDICINE

## 2021-07-16 PROCEDURE — 85610 PROTHROMBIN TIME: CPT | Performed by: FAMILY MEDICINE

## 2021-07-16 NOTE — TELEPHONE ENCOUNTER
Patient needs INR recheck in 1 week per Dr. Cisneros. Last checked today 7/16/21. Called patient and left message.

## 2021-07-16 NOTE — PROGRESS NOTES
Subjective   Jeanna Parks is a 70 y.o. female.     Here for follow-up on blood pressure and diabetes  Does not test her bs  Has had covid vaccines  She saw her cardiologist last month but has not had her INR done  She saw her hematologist the month before  She says she feels well  She says she is overdue on her eye exam       The following portions of the patient's history were reviewed and updated as appropriate: allergies, current medications, past family history, past medical history, past social history, past surgical history, and problem list.  Past Medical History:   Diagnosis Date   • Arthritis    • Congestive heart failure (CMS/[facility]) 12/30/2020   • Gout    • Hypertension 9/3/2015   • Sciatica    • Type 2 diabetes mellitus without complication, without long-term current use of insulin (CMS/[facility]) 9/3/2019    glucometer, test strips and lancets sent new onset start metformin DM education encouraged weight loss     Past Surgical History:   Procedure Laterality Date   • CARDIAC CATHETERIZATION N/A 2/22/2021    Procedure: Cardiac Catheterization/Vascular Study;  Surgeon: Kit Jacques MD;  Location: [facility] CATH INVASIVE LOCATION;  Service: Cardiovascular;  Laterality: N/A;   • CARDIAC CATHETERIZATION N/A 5/26/2021    Procedure: Left Heart Cath,impella assisted PCI;  Surgeon: Itz Raymundo MD;  Location: [facility] CATH INVASIVE LOCATION;  Service: Cardiovascular;  Laterality: N/A;   • CARDIAC CATHETERIZATION N/A 5/26/2021    Procedure: Stent HADLEY coronary;  Surgeon: Itz Raymundo MD;  Location: [facility] CATH INVASIVE LOCATION;  Service: Cardiovascular;  Laterality: N/A;   • CORONARY ANGIOPLASTY  05/26/2021    1x stent to LAD     Family History   Problem Relation Age of Onset   • Heart disease Mother    • Heart failure Mother    • Heart disease Sister    • Heart failure Sister    • Heart disease Sister    • Heart failure Sister      Social History     Socioeconomic History   •  Marital status:      Spouse name: Not on file   • Number of children: Not on file   • Years of education: Not on file   • Highest education level: Not on file   Tobacco Use   • Smoking status: Former Smoker     Types: Cigarettes     Quit date: 2020     Years since quittin.6   • Smokeless tobacco: Never Used   • Tobacco comment: quit a month ago   Vaping Use   • Vaping Use: Never used   Substance and Sexual Activity   • Alcohol use: No   • Drug use: No   • Sexual activity: Defer         Current Outpatient Medications:   •  albuterol sulfate  (90 Base) MCG/ACT inhaler, Inhale 2 puffs Every 4 (Four) Hours As Needed for Wheezing., Disp: 18 g, Rfl: 0  •  allopurinol (ZYLOPRIM) 100 MG tablet, Take 1 tablet by mouth Daily., Disp: 90 tablet, Rfl: 0  •  atorvastatin (Lipitor) 40 MG tablet, Take 1 tablet by mouth Every Night., Disp: 90 tablet, Rfl: 3  •  budesonide-formoterol (Symbicort) 80-4.5 MCG/ACT inhaler, Inhale 2 puffs 2 (Two) Times a Day., Disp: 10.2 g, Rfl: 12  •  lisinopril (PRINIVIL,ZESTRIL) 2.5 MG tablet, Take 1 tablet by mouth Daily., Disp: 30 tablet, Rfl: 11  •  metFORMIN (GLUCOPHAGE) 500 MG tablet, Take 1 tablet by mouth 2 (Two) Times a Day With Meals., Disp: 180 tablet, Rfl: 2  •  metoprolol succinate XL (TOPROL-XL) 25 MG 24 hr tablet, Take 1 tablet by mouth Daily., Disp: 90 tablet, Rfl: 3  •  spironolactone (ALDACTONE) 25 MG tablet, TAKE 1 TABLET EVERY DAY, Disp: 90 tablet, Rfl: 3  •  ticagrelor (BRILINTA) 90 MG tablet tablet, Take 1 tablet by mouth 2 (Two) Times a Day., Disp: 180 tablet, Rfl: 3  •  torsemide (DEMADEX) 20 MG tablet, Take 1 tablet by mouth Daily., Disp: 90 tablet, Rfl: 3  •  warfarin (COUMADIN) 5 MG tablet, Take 1 tablet by mouth Every Night. PE  Indications: DVT/PE (active thrombosis), Disp: 90 tablet, Rfl: 1    Review of Systems   Constitutional: Negative for diaphoresis, fatigue, fever, unexpected weight gain and unexpected weight loss.   Respiratory: Negative for  "cough, chest tightness and shortness of breath.    Cardiovascular: Negative for chest pain, palpitations and leg swelling.   Gastrointestinal: Negative for nausea and vomiting.   Endocrine: Negative.    Neurological: Negative for dizziness, syncope and headache.     /74 (BP Location: Left arm, Patient Position: Sitting, Cuff Size: Large Adult)   Pulse 106   Temp 97.5 °F (36.4 °C) (Temporal)   Ht 160 cm (63\")   Wt 122 kg (269 lb)   LMP  (LMP Unknown)   SpO2 98%   Breastfeeding No   BMI 47.65 kg/m²       Objective   Physical Exam  Vitals and nursing note reviewed.   Constitutional:       General: She is not in acute distress.     Appearance: She is well-developed. She is morbidly obese.   HENT:      Head: Normocephalic and atraumatic.   Neck:      Thyroid: No thyromegaly.   Cardiovascular:      Rate and Rhythm: Normal rate and regular rhythm.      Heart sounds: Normal heart sounds. No murmur heard.   No friction rub. No gallop.    Pulmonary:      Effort: Pulmonary effort is normal. No respiratory distress.      Breath sounds: Normal breath sounds. No wheezing or rales.   Musculoskeletal:      Cervical back: Neck supple.      Right lower leg: No edema.      Left lower leg: No edema.   Lymphadenopathy:      Cervical: No cervical adenopathy.   Skin:     General: Skin is warm and dry.   Neurological:      Mental Status: She is alert.   Psychiatric:         Mood and Affect: Mood normal.         Behavior: Behavior is cooperative.           Assessment/Plan   Problems Addressed this Visit        Cardiac and Vasculature    Essential hypertension (Chronic)       Endocrine and Metabolic    Type 2 diabetes mellitus without complication, without long-term current use of insulin (CMS/[facility]) - Primary (Chronic)    Relevant Orders    Hemoglobin A1c      Diagnoses       Codes Comments    Type 2 diabetes mellitus without complication, without long-term current use of insulin (CMS/[facility])    -  Primary ICD-10-CM: " E11.9  ICD-9-CM: 250.00     Essential hypertension     ICD-10-CM: I10  ICD-9-CM: 401.9         Blood pressure is stable and she is feeling well  Her INR will be done today  Her A1c was ordered  She was counseled on the need for weight loss and dietary compliance  She was congratulated on the weight loss that she has had  She was encouraged to see her eye doctor

## 2021-08-10 ENCOUNTER — ANTICOAGULATION VISIT (OUTPATIENT)
Dept: CARDIOLOGY | Facility: CLINIC | Age: 71
End: 2021-08-10

## 2021-08-10 VITALS
DIASTOLIC BLOOD PRESSURE: 82 MMHG | HEART RATE: 103 BPM | WEIGHT: 267 LBS | BODY MASS INDEX: 47.3 KG/M2 | SYSTOLIC BLOOD PRESSURE: 152 MMHG

## 2021-08-10 LAB — INR PPP: 2.1 (ref 0.9–1.1)

## 2021-08-10 PROCEDURE — 36416 COLLJ CAPILLARY BLOOD SPEC: CPT | Performed by: INTERNAL MEDICINE

## 2021-08-10 PROCEDURE — 85610 PROTHROMBIN TIME: CPT | Performed by: INTERNAL MEDICINE

## 2021-09-14 ENCOUNTER — ANTICOAGULATION VISIT (OUTPATIENT)
Dept: CARDIOLOGY | Facility: CLINIC | Age: 71
End: 2021-09-14

## 2021-09-14 VITALS
BODY MASS INDEX: 47.3 KG/M2 | DIASTOLIC BLOOD PRESSURE: 61 MMHG | HEART RATE: 92 BPM | SYSTOLIC BLOOD PRESSURE: 148 MMHG | WEIGHT: 267 LBS

## 2021-09-14 DIAGNOSIS — Z79.01 LONG TERM (CURRENT) USE OF ANTICOAGULANTS: Primary | ICD-10-CM

## 2021-09-14 DIAGNOSIS — I26.09 ACUTE PULMONARY EMBOLISM WITH ACUTE COR PULMONALE, UNSPECIFIED PULMONARY EMBOLISM TYPE (HCC): ICD-10-CM

## 2021-09-14 LAB — INR PPP: 1.7 (ref 0.9–1.1)

## 2021-09-14 PROCEDURE — 85610 PROTHROMBIN TIME: CPT | Performed by: INTERNAL MEDICINE

## 2021-09-14 PROCEDURE — 36416 COLLJ CAPILLARY BLOOD SPEC: CPT | Performed by: INTERNAL MEDICINE

## 2021-09-30 RX ORDER — ALLOPURINOL 100 MG/1
TABLET ORAL
Qty: 90 TABLET | Refills: 0 | Status: SHIPPED | OUTPATIENT
Start: 2021-09-30 | End: 2022-01-24 | Stop reason: SDUPTHER

## 2021-10-19 ENCOUNTER — ANTICOAGULATION VISIT (OUTPATIENT)
Dept: CARDIOLOGY | Facility: CLINIC | Age: 71
End: 2021-10-19

## 2021-10-19 VITALS
BODY MASS INDEX: 47.12 KG/M2 | WEIGHT: 266 LBS | HEART RATE: 92 BPM | SYSTOLIC BLOOD PRESSURE: 124 MMHG | DIASTOLIC BLOOD PRESSURE: 76 MMHG

## 2021-10-19 DIAGNOSIS — I27.81 COR PULMONALE (CHRONIC) (HCC): Primary | ICD-10-CM

## 2021-10-19 DIAGNOSIS — Z79.01 LONG TERM (CURRENT) USE OF ANTICOAGULANTS: ICD-10-CM

## 2021-10-19 LAB — INR PPP: 2.2 (ref 0.9–1.1)

## 2021-10-19 PROCEDURE — 36416 COLLJ CAPILLARY BLOOD SPEC: CPT | Performed by: INTERNAL MEDICINE

## 2021-10-19 PROCEDURE — 85610 PROTHROMBIN TIME: CPT | Performed by: INTERNAL MEDICINE

## 2021-10-22 ENCOUNTER — TELEPHONE (OUTPATIENT)
Dept: CARDIOLOGY | Facility: CLINIC | Age: 71
End: 2021-10-22

## 2021-10-22 NOTE — TELEPHONE ENCOUNTER
Asking if she needs to hold Coumadin prior to having a filling on Monday 10/25/21? I let her know we typically need a clearance request. Per patient dentist is closed today. She is scheduled monday @ 1:30 PM.  She just needs to know what to do. I also let her know Dr. Raymundo is not in office today.

## 2021-10-22 NOTE — TELEPHONE ENCOUNTER
Pt contacted and made aware that is is up to the dentist if she needs to stop her blood thinner, at that time the dental office can reach out to us for clearance. Pt voiced understanding.

## 2021-11-04 RX ORDER — WARFARIN SODIUM 5 MG/1
TABLET ORAL
Qty: 90 TABLET | Refills: 1 | Status: SHIPPED | OUTPATIENT
Start: 2021-11-04 | End: 2022-02-08 | Stop reason: SDUPTHER

## 2021-11-11 ENCOUNTER — TELEPHONE (OUTPATIENT)
Dept: FAMILY MEDICINE CLINIC | Facility: CLINIC | Age: 71
End: 2021-11-11

## 2021-11-11 NOTE — TELEPHONE ENCOUNTER
We have been filling the pts Coumadin but haven't been following her INR.  She has had it checked at the cardiologist a few times but she is on it for a non-cardio reason so they aren't managing it.  The nurse wanted to speak with me because she has tried to get us to manage it in the past but it somehow keeps falling through the cracks.  I told her that I would call the patient today to get her on our schedule.

## 2021-11-12 ENCOUNTER — ANTICOAGULATION VISIT (OUTPATIENT)
Dept: FAMILY MEDICINE CLINIC | Facility: CLINIC | Age: 71
End: 2021-11-12

## 2021-11-12 DIAGNOSIS — Z79.01 LONG TERM (CURRENT) USE OF ANTICOAGULANTS: Primary | ICD-10-CM

## 2021-11-12 DIAGNOSIS — I26.09 ACUTE PULMONARY EMBOLISM WITH ACUTE COR PULMONALE, UNSPECIFIED PULMONARY EMBOLISM TYPE (HCC): ICD-10-CM

## 2021-11-12 LAB — INR PPP: 2.1 (ref 2–3)

## 2021-11-12 PROCEDURE — 85610 PROTHROMBIN TIME: CPT | Performed by: FAMILY MEDICINE

## 2021-11-12 PROCEDURE — 36416 COLLJ CAPILLARY BLOOD SPEC: CPT | Performed by: FAMILY MEDICINE

## 2021-12-14 ENCOUNTER — ANTICOAGULATION VISIT (OUTPATIENT)
Dept: FAMILY MEDICINE CLINIC | Facility: CLINIC | Age: 71
End: 2021-12-14

## 2021-12-14 DIAGNOSIS — I26.09 ACUTE PULMONARY EMBOLISM WITH ACUTE COR PULMONALE, UNSPECIFIED PULMONARY EMBOLISM TYPE (HCC): ICD-10-CM

## 2021-12-14 DIAGNOSIS — Z79.01 LONG TERM (CURRENT) USE OF ANTICOAGULANTS: Primary | ICD-10-CM

## 2021-12-14 LAB — INR PPP: 2.8 (ref 2–3)

## 2021-12-14 PROCEDURE — 85610 PROTHROMBIN TIME: CPT | Performed by: FAMILY MEDICINE

## 2021-12-14 PROCEDURE — 36416 COLLJ CAPILLARY BLOOD SPEC: CPT | Performed by: FAMILY MEDICINE

## 2022-01-07 NOTE — TELEPHONE ENCOUNTER
It looks like she has talked to the ortho office now    See if she still wants to have a new referral    The delay was ortho trying to get the injections covered through insurance  
Pt still would like referral.   
Pt wants a referral to a different ortho office. She says that she is in terrible pain and keeps calling ortho but she is not getting any relief and has not heard anything from them about being seen for an apt to discuss her pain.   
Referral faxed to ed stock's office;. They will call her to schedule  
Referral ordered  
02-Dec-2021

## 2022-01-14 ENCOUNTER — ANTICOAGULATION VISIT (OUTPATIENT)
Dept: FAMILY MEDICINE CLINIC | Facility: CLINIC | Age: 72
End: 2022-01-14

## 2022-01-14 DIAGNOSIS — Z79.01 LONG TERM (CURRENT) USE OF ANTICOAGULANTS: Primary | ICD-10-CM

## 2022-01-14 LAB — INR PPP: 1.8 (ref 2–3)

## 2022-01-14 PROCEDURE — 36416 COLLJ CAPILLARY BLOOD SPEC: CPT | Performed by: FAMILY MEDICINE

## 2022-01-14 PROCEDURE — 85610 PROTHROMBIN TIME: CPT | Performed by: FAMILY MEDICINE

## 2022-01-24 ENCOUNTER — TELEPHONE (OUTPATIENT)
Dept: CARDIOLOGY | Facility: CLINIC | Age: 72
End: 2022-01-24

## 2022-01-24 ENCOUNTER — TELEPHONE (OUTPATIENT)
Dept: FAMILY MEDICINE CLINIC | Facility: CLINIC | Age: 72
End: 2022-01-24

## 2022-01-24 DIAGNOSIS — J81.0 ACUTE PULMONARY EDEMA: ICD-10-CM

## 2022-01-24 DIAGNOSIS — R06.02 SHORTNESS OF BREATH: ICD-10-CM

## 2022-01-24 DIAGNOSIS — J18.9 PNEUMONIA OF RIGHT LUNG DUE TO INFECTIOUS ORGANISM, UNSPECIFIED PART OF LUNG: ICD-10-CM

## 2022-01-24 RX ORDER — ATORVASTATIN CALCIUM 40 MG/1
40 TABLET, FILM COATED ORAL NIGHTLY
Qty: 90 TABLET | Refills: 3 | Status: SHIPPED | OUTPATIENT
Start: 2022-01-24

## 2022-01-24 RX ORDER — LISINOPRIL 2.5 MG/1
2.5 TABLET ORAL DAILY
Qty: 90 TABLET | Refills: 3 | Status: SHIPPED | OUTPATIENT
Start: 2022-01-24

## 2022-01-24 RX ORDER — WARFARIN SODIUM 5 MG/1
TABLET ORAL
Qty: 90 TABLET | Refills: 1 | OUTPATIENT
Start: 2022-01-24

## 2022-01-24 RX ORDER — SPIRONOLACTONE 25 MG/1
25 TABLET ORAL DAILY
Qty: 90 TABLET | Refills: 3 | Status: SHIPPED | OUTPATIENT
Start: 2022-01-24

## 2022-01-24 RX ORDER — METOPROLOL SUCCINATE 25 MG/1
25 TABLET, EXTENDED RELEASE ORAL DAILY
Qty: 90 TABLET | Refills: 3 | Status: SHIPPED | OUTPATIENT
Start: 2022-01-24

## 2022-01-24 RX ORDER — TORSEMIDE 20 MG/1
20 TABLET ORAL DAILY
Qty: 90 TABLET | Refills: 3 | Status: SHIPPED | OUTPATIENT
Start: 2022-01-24

## 2022-01-24 NOTE — TELEPHONE ENCOUNTER
Rx Refill Note  Requested Prescriptions     Pending Prescriptions Disp Refills   • atorvastatin (Lipitor) 40 MG tablet 90 tablet 3     Sig: Take 1 tablet by mouth Every Night.   • lisinopril (PRINIVIL,ZESTRIL) 2.5 MG tablet 30 tablet 11     Sig: Take 1 tablet by mouth Daily.   • metoprolol succinate XL (TOPROL-XL) 25 MG 24 hr tablet 90 tablet 3     Sig: Take 1 tablet by mouth Daily.   • spironolactone (ALDACTONE) 25 MG tablet 90 tablet 3     Sig: Take 1 tablet by mouth Daily.   • ticagrelor (BRILINTA) 90 MG tablet tablet 180 tablet 3     Sig: Take 1 tablet by mouth 2 (Two) Times a Day.   • torsemide (DEMADEX) 20 MG tablet 90 tablet 3     Sig: Take 1 tablet by mouth Daily.   • warfarin (COUMADIN) 5 MG tablet 90 tablet 1      Last office visit with prescribing clinician: 6/21/2021      Next office visit with prescribing clinician: 3/29/2022     Basic Metabolic Panel (05/27/2021 02:23)         Aylin Smallwood MA  01/24/22, 14:38 EST

## 2022-01-24 NOTE — TELEPHONE ENCOUNTER
Incoming Refill Request      Medication requested (name and dose): ATOTVASTATIN 40MG,LISINOPRIL 2.5 MG,METFORMIN 500 MG, METOPROLOL 25 MG,SPIRONALACTONE 25 MG, BERLINTA 90 MG,TORSEMIDE 20 MG,WARFARIN 5 MG     Pharmacy where request should be sent: [facility]  [phone]     Additional details provided by patient:     Best call back number: [phone]    Does the patient have less than a 3 day supply:  [] Yes  [x] No    Paula Gibson, Migueled Rep  01/24/22, 14:24 EST

## 2022-01-24 NOTE — TELEPHONE ENCOUNTER
Rx Refill Note  Requested Prescriptions     Pending Prescriptions Disp Refills   • ticagrelor (BRILINTA) 90 MG tablet tablet 180 tablet 3     Sig: Take 1 tablet by mouth 2 (Two) Times a Day.      Last office visit with prescribing clinician: 6/21/2021      Next office visit with prescribing clinician: 3/29/2022            Aylin Smallwood MA  01/24/22, 16:42 EST

## 2022-01-24 NOTE — TELEPHONE ENCOUNTER
Caller: Ball ParksJeanna    Relationship: Self    Best call back number:[phone]    Requested Prescriptions:   Requested Prescriptions     Pending Prescriptions Disp Refills   • albuterol sulfate  (90 Base) MCG/ACT inhaler 18 g 0     Sig: Inhale 2 puffs Every 4 (Four) Hours As Needed for Wheezing.   • allopurinol (ZYLOPRIM) 100 MG tablet 90 tablet 0     Sig: Take 1 tablet by mouth Daily.   • budesonide-formoterol (Symbicort) 80-4.5 MCG/ACT inhaler 10.2 g 12     Sig: Inhale 2 puffs 2 (Two) Times a Day.   • metFORMIN (GLUCOPHAGE) 500 MG tablet 180 tablet 2     Sig: Take 1 tablet by mouth 2 (Two) Times a Day With Meals.        Pharmacy where request should be sent: [facility] - [address] - [phone] [facility] [phone] FX     Additional details provided by patient: CHANGING MAIL ORDER PHARMACY DUE TO INSURANCE CHANGE.   PLEASE SEND NEW PRESCRIPTIONS     Does the patient have less than a 3 day supply:  [] Yes  [x] No    Tiffanie Pro Rep   01/24/22 14:23 EST

## 2022-01-24 NOTE — TELEPHONE ENCOUNTER
Patient warfarin is not prescribed or managed by our office RX Declined and message sent to pharmacy to send RX to PCP. Called spoke with representative Daiana TOVAR at [facility] advised of the above they will contact PCP for refill  apLPN

## 2022-01-25 RX ORDER — BUDESONIDE AND FORMOTEROL FUMARATE DIHYDRATE 80; 4.5 UG/1; UG/1
2 AEROSOL RESPIRATORY (INHALATION)
Qty: 10.2 G | Refills: 5 | Status: SHIPPED | OUTPATIENT
Start: 2022-01-25

## 2022-01-25 RX ORDER — ALLOPURINOL 100 MG/1
100 TABLET ORAL DAILY
Qty: 90 TABLET | Refills: 1 | Status: SHIPPED | OUTPATIENT
Start: 2022-01-25 | End: 2022-05-11 | Stop reason: SDUPTHER

## 2022-01-25 RX ORDER — ALBUTEROL SULFATE 90 UG/1
2 AEROSOL, METERED RESPIRATORY (INHALATION) EVERY 4 HOURS PRN
Qty: 18 G | Refills: 5 | Status: SHIPPED | OUTPATIENT
Start: 2022-01-25

## 2022-02-08 ENCOUNTER — TELEPHONE (OUTPATIENT)
Dept: CARDIOLOGY | Facility: CLINIC | Age: 72
End: 2022-02-08

## 2022-02-08 NOTE — TELEPHONE ENCOUNTER
Caller: Jeanna Brown    Relationship: Self    Best call back number: [phone]    Requested Prescriptions:   Requested Prescriptions     Pending Prescriptions Disp Refills   • warfarin (COUMADIN) 5 MG tablet 90 tablet 1        Pharmacy where request should be sent: [facility] - [address] - [phone]  - [phone] FX     Additional details provided by patient: PATIENT STATES SHE HAS ABOUT 4 DAYS REMAINING    Does the patient have less than a 3 day supply:  [x] Yes  [] No    Tiffanie Isbell Rep   02/08/22 11:56 EST

## 2022-02-11 RX ORDER — WARFARIN SODIUM 5 MG/1
5 TABLET ORAL NIGHTLY
Qty: 90 TABLET | Refills: 1 | Status: SHIPPED | OUTPATIENT
Start: 2022-02-11 | End: 2022-02-18 | Stop reason: SDUPTHER

## 2022-02-15 ENCOUNTER — ANTICOAGULATION VISIT (OUTPATIENT)
Dept: FAMILY MEDICINE CLINIC | Facility: CLINIC | Age: 72
End: 2022-02-15

## 2022-02-15 DIAGNOSIS — Z79.01 LONG TERM (CURRENT) USE OF ANTICOAGULANTS: Primary | ICD-10-CM

## 2022-02-15 LAB — INR PPP: 2.6 (ref 2–3)

## 2022-02-15 PROCEDURE — 36416 COLLJ CAPILLARY BLOOD SPEC: CPT | Performed by: FAMILY MEDICINE

## 2022-02-15 PROCEDURE — 85610 PROTHROMBIN TIME: CPT | Performed by: FAMILY MEDICINE

## 2022-02-18 ENCOUNTER — TELEPHONE (OUTPATIENT)
Dept: FAMILY MEDICINE CLINIC | Facility: CLINIC | Age: 72
End: 2022-02-18

## 2022-02-18 RX ORDER — WARFARIN SODIUM 5 MG/1
5 TABLET ORAL NIGHTLY
Qty: 14 TABLET | Refills: 0 | Status: SHIPPED | OUTPATIENT
Start: 2022-02-18 | End: 2022-02-23 | Stop reason: SDUPTHER

## 2022-02-18 NOTE — TELEPHONE ENCOUNTER
Caller: Jeanna Brown    Relationship: Self    Best call back number: [phone]    Requested Prescriptions:   Requested Prescriptions     Pending Prescriptions Disp Refills   • warfarin (COUMADIN) 5 MG tablet 90 tablet 1     Sig: Take 1 tablet by mouth Every Night.        Pharmacy where request should be sent: [facility] [address] [place] [address] [phone] [place] [phone] FX     Additional details provided by patient: PATIENT IS NEEDING A FEW TABLETS TO GET HER THROUGH UNTIL NEXT Friday , LONG ENOUGH FOR [facility] TO MAIL HER PRESCRIPTION.    Does the patient have less than a 3 day supply:  [x] Yes  [] No    Tiffanie Escobar Rep   02/18/22 09:50 EST

## 2022-02-23 ENCOUNTER — TELEPHONE (OUTPATIENT)
Dept: FAMILY MEDICINE CLINIC | Facility: CLINIC | Age: 72
End: 2022-02-23

## 2022-02-23 RX ORDER — WARFARIN SODIUM 5 MG/1
TABLET ORAL
Qty: 110 TABLET | Refills: 1 | Status: SHIPPED | OUTPATIENT
Start: 2022-02-23 | End: 2022-08-28

## 2022-02-23 NOTE — TELEPHONE ENCOUNTER
THE PATIENT STATES THAT SHE WILL NEED A 3 MONTH SUPPLY OF:     warfarin (COUMADIN) 5 MG tablet     THIS WILL NEED TO BE SENT TO [facility] [address] [place] [address] [place] - [address] [place] [address] - [phone] [facility] [phone] FX.    PLEASE ADVISE THE PATIENT WHEN OR IF THIS IS ABLE TO BE DONE. THE PATIENT CAN BE REACHED [phone].

## 2022-03-15 ENCOUNTER — ANTICOAGULATION VISIT (OUTPATIENT)
Dept: FAMILY MEDICINE CLINIC | Facility: CLINIC | Age: 72
End: 2022-03-15

## 2022-03-15 DIAGNOSIS — Z79.01 LONG TERM (CURRENT) USE OF ANTICOAGULANTS: Primary | ICD-10-CM

## 2022-03-15 LAB — INR PPP: 2.2 (ref 2–3)

## 2022-03-15 PROCEDURE — 36416 COLLJ CAPILLARY BLOOD SPEC: CPT | Performed by: FAMILY MEDICINE

## 2022-03-15 PROCEDURE — 85610 PROTHROMBIN TIME: CPT | Performed by: FAMILY MEDICINE

## 2022-04-18 ENCOUNTER — OFFICE VISIT (OUTPATIENT)
Dept: CARDIOLOGY | Facility: CLINIC | Age: 72
End: 2022-04-18

## 2022-04-18 VITALS
DIASTOLIC BLOOD PRESSURE: 71 MMHG | HEART RATE: 94 BPM | OXYGEN SATURATION: 100 % | BODY MASS INDEX: 45 KG/M2 | WEIGHT: 254 LBS | SYSTOLIC BLOOD PRESSURE: 108 MMHG | HEIGHT: 63 IN

## 2022-04-18 DIAGNOSIS — I07.1 SEVERE TRICUSPID REGURGITATION: ICD-10-CM

## 2022-04-18 DIAGNOSIS — I34.0 SEVERE MITRAL REGURGITATION: ICD-10-CM

## 2022-04-18 DIAGNOSIS — E11.9 TYPE 2 DIABETES MELLITUS WITHOUT COMPLICATION, WITHOUT LONG-TERM CURRENT USE OF INSULIN: ICD-10-CM

## 2022-04-18 DIAGNOSIS — I50.22 CHRONIC HFREF (HEART FAILURE WITH REDUCED EJECTION FRACTION): ICD-10-CM

## 2022-04-18 DIAGNOSIS — Z98.61 CAD S/P PERCUTANEOUS CORONARY ANGIOPLASTY: Primary | ICD-10-CM

## 2022-04-18 DIAGNOSIS — E66.01 MORBID OBESITY WITH BMI OF 40.0-44.9, ADULT: ICD-10-CM

## 2022-04-18 DIAGNOSIS — I25.10 CAD S/P PERCUTANEOUS CORONARY ANGIOPLASTY: Primary | ICD-10-CM

## 2022-04-18 PROCEDURE — 99214 OFFICE O/P EST MOD 30 MIN: CPT | Performed by: INTERNAL MEDICINE

## 2022-04-18 PROCEDURE — 93000 ELECTROCARDIOGRAM COMPLETE: CPT | Performed by: INTERNAL MEDICINE

## 2022-04-18 NOTE — PROGRESS NOTES
Subjective:     Encounter Date:04/18/2022      Patient ID: Jeanna Parks is a 71 y.o. female.    Chief Complaint :Follow-up for CAD, CHF, hypertension, diabetes, dyslipidemia  History of Present Illness      Ms. Jeanna Parks has PMH of      CAD, cath 2/22/2021 severe proximal LAD disease  HFrEF, right and left heart cath 2/21/2021 revealing severe pulmonary hypertension, severe TR, MR-  Moderate to severe mitral regurgitation  Severe TR, severe pulmonary hypertension  Cardiomyopathy, combination of ischemic plus or minus valvular heart disease  Hypertension  Dyslipidemia  diabetes   Obesity with BMI over 70  DVT  PE 2/25/2021  Gout, sciatica  No history of any surgeries  Positive family history of premature heart disease in multiple sisters and mother.  Former smoker quit this year      Is here for    follow-up.  Patient denies any chest pain..    Patient's arterial blood pressure is 108/71, heart rate 94, O2 sat of 100% on room air.  MI is over 44.    Patient had a cardiac cath 2/22/2021 which revealed subtotal occlusion of proximal LAD  Echocardiogram 2/22/2021 reviewed revealing LV dysfunction with EF of 35-40% and diastolic dysfunction with RV enlargement and severe pulmonary hypertension and moderate to severe mitral regurgitation and biatrial enlargement.  Reviewed detailed hospital records.  Patient was in the hospital 2/19/2021-3/2/2021 with shortness of breath was diagnosed with acute PE, severe MR, severe TR, pulmonary hypertension, cor pulmonale, CAD.  Review of records reveal labs from 12/31/2020 cholesterol 137 triglycerides 129, HDL 46, LDL 68.  Hemoglobin A1c 6/30/2019 was 6.4 is improved to 5.4 on 12/30/20.  Normal CMP 12/30/2019 except for elevated glucose at 130.  Labs from 3/15/2022 reveal INR of 2.2.      Assessment ;      CAD, PCI  HFrEF  Ischemic cardiomyopathy EF of 35 to 40%  Valvular heart disease with severe MR and severe TR  Severe pulmonary  hypertension  Hypertension  Dyslipidemia  Type 2 diabetes  Morbid obesity with BMI over 50     Recommendations and plan        We will discontinue aspirin since patient is on warfarin and Brilinta.  Continue medical management with Brilinta, atorvastatin, metoprolol succinate, Aldactone, torsemide.  We will continue lisinopril 2.5 mg daily.  To help with CAD, CHF, cardiomyopathy, valvular heart disease, pulmonary hypertension as tolerated.  Monitor renal function and electrolytes  Reviewed diabetes with patient, patient says she does not have diabetes but she takes Metformin.  I reviewed labs with patient from 6/30/2019 with A1c of 6.4.  We will continue atorvastatin for dyslipidemia.  We will continue warfarin for DVT PE, patient was seen by hematology Dr. Falcon and was advised to be on lifelong anticoagulation for unprovoked DVT PE.  Reviewed BMI over 44, counseled on weight loss diet and exercise.  We will check an echocardiogram.  Evaluate valvular heart disease.  Will check labs before visit.  Reviewed EKG results with patient.          ECG 12 Lead    Date/Time: 4/18/2022 1:43 PM  Performed by: Itz Raymundo MD  Authorized by: Itz Raymundo MD   Comparison: compared with previous ECG from 6/21/2021  Comparison to previous ECG: EKG done today reviewed/interpreted by me reveals sinus rhythm with rate of 86 bpm with U waves in V1 V2 consistent with anteroseptal MI, occasional PVCs, compared to EKG from 6/21/2021 no new changes              The following portions of the patient's history were reviewed and updated as appropriate: allergies, current medications, past family history, past medical history, past social history, past surgical history and problem list.    Assessment:         MDM     Diagnosis Plan   1. CAD S/P percutaneous coronary angioplasty  ECG 12 Lead    Adult Transthoracic Echo Complete W/ Cont if Necessary Per Protocol    Comprehensive Metabolic Panel    Lipid Panel   2.  Chronic HFrEF (heart failure with reduced ejection fraction) ([facility])  ECG 12 Lead    Adult Transthoracic Echo Complete W/ Cont if Necessary Per Protocol    Comprehensive Metabolic Panel    Lipid Panel   3. Severe mitral regurgitation  ECG 12 Lead    Adult Transthoracic Echo Complete W/ Cont if Necessary Per Protocol    Comprehensive Metabolic Panel    Lipid Panel   4. Severe tricuspid regurgitation  ECG 12 Lead    Adult Transthoracic Echo Complete W/ Cont if Necessary Per Protocol    Comprehensive Metabolic Panel    Lipid Panel   5. Type 2 diabetes mellitus without complication, without long-term current use of insulin ([facility])  ECG 12 Lead    Adult Transthoracic Echo Complete W/ Cont if Necessary Per Protocol    Comprehensive Metabolic Panel    Lipid Panel   6. Morbid obesity with BMI of 40.0-44.9, adult ([facility])  ECG 12 Lead    Adult Transthoracic Echo Complete W/ Cont if Necessary Per Protocol    Comprehensive Metabolic Panel    Lipid Panel          Plan:               Past Medical History:  Past Medical History:   Diagnosis Date   • Arthritis    • Congestive heart failure ([facility]) 12/30/2020   • Gout    • Hypertension 9/3/2015   • Sciatica    • Type 2 diabetes mellitus without complication, without long-term current use of insulin ([facility]) 9/3/2019    glucometer, test strips and lancets sent new onset start metformin DM education encouraged weight loss     Past Surgical History:  Past Surgical History:   Procedure Laterality Date   • CARDIAC CATHETERIZATION N/A 2/22/2021    Procedure: Cardiac Catheterization/Vascular Study;  Surgeon: Kit Jacques MD;  Location:  [facility]VASIVE [facility];  Service: Cardiovascular;  Laterality: N/A;   • CARDIAC CATHETERIZATION N/A 5/26/2021    Procedure: Left Heart Cath,impella assisted PCI;  Surgeon: Itz Raymundo MD;  Location:  [facility]AS[facility];  Service: Cardiovascular;  Laterality: N/A;   • CARDIAC CATHETERIZATION N/A 5/26/2021    Procedure: Stent  HADLEY coronary;  Surgeon: Itz Raymundo MD;  Location: [facility] CATH INVASIVE LOCATION;  Service: Cardiovascular;  Laterality: N/A;   • CORONARY ANGIOPLASTY  2021    1x stent to LAD      Allergies:  No Known Allergies  Home Meds:  Current Meds:     Current Outpatient Medications:   •  albuterol sulfate  (90 Base) MCG/ACT inhaler, Inhale 2 puffs Every 4 (Four) Hours As Needed for Wheezing., Disp: 18 g, Rfl: 5  •  allopurinol (ZYLOPRIM) 100 MG tablet, Take 1 tablet by mouth Daily., Disp: 90 tablet, Rfl: 1  •  atorvastatin (Lipitor) 40 MG tablet, Take 1 tablet by mouth Every Night., Disp: 90 tablet, Rfl: 3  •  budesonide-formoterol (Symbicort) 80-4.5 MCG/ACT inhaler, Inhale 2 puffs 2 (Two) Times a Day., Disp: 10.2 g, Rfl: 5  •  lisinopril (PRINIVIL,ZESTRIL) 2.5 MG tablet, Take 1 tablet by mouth Daily., Disp: 90 tablet, Rfl: 3  •  metFORMIN (GLUCOPHAGE) 500 MG tablet, Take 1 tablet by mouth 2 (Two) Times a Day With Meals., Disp: 180 tablet, Rfl: 1  •  metoprolol succinate XL (TOPROL-XL) 25 MG 24 hr tablet, Take 1 tablet by mouth Daily., Disp: 90 tablet, Rfl: 3  •  spironolactone (ALDACTONE) 25 MG tablet, Take 1 tablet by mouth Daily., Disp: 90 tablet, Rfl: 3  •  ticagrelor (BRILINTA) 90 MG tablet tablet, Take 1 tablet by mouth 2 (Two) Times a Day., Disp: 180 tablet, Rfl: 3  •  torsemide (DEMADEX) 20 MG tablet, Take 1 tablet by mouth Daily., Disp: 90 tablet, Rfl: 3  •  warfarin (COUMADIN) 5 MG tablet, Take 1-1/2 pills on  and 1 pill on , Disp: 110 tablet, Rfl: 1  Social History:   Social History     Tobacco Use   • Smoking status: Former Smoker     Types: Cigarettes     Quit date: 2020     Years since quittin.3   • Smokeless tobacco: Never Used   • Tobacco comment: quit a month ago   Substance Use Topics   • Alcohol use: No      Family History:  Family History   Problem Relation Age of Onset   • Heart disease Mother    • Heart failure  "Mother    • Heart disease Sister    • Heart failure Sister    • Heart disease Sister    • Heart failure Sister               Review of Systems   Constitutional: Negative for malaise/fatigue.   Cardiovascular: Negative for chest pain, leg swelling and palpitations.   Respiratory: Negative for shortness of breath.    Skin: Negative for rash.   Neurological: Negative for dizziness, light-headedness and numbness.     All other systems are negative         Objective:     Physical Exam  /71   Pulse 94   Ht 160 cm (63\")   Wt 115 kg (254 lb)   LMP  (LMP Unknown)   SpO2 100%   BMI 44.99 kg/m²   General:  Appears in no acute distress, pleasant obese  Eyes: Sclera is anicteric,  conjunctiva is clear   HEENT:  No JVD.  No carotid bruits  Respiratory: Respirations regular and unlabored at rest.  Clear to auscultation  Cardiovascular: S1,S2 Regular rate and rhythm. No murmur, rub or gallop auscultated.   Extremities: No digital clubbing or cyanosis, no edema  Skin: Color pink. Skin warm and dry to touch. No rashes  No xanthoma  Neuro: Alert and awake.    Lab Reviewed:         Itz Raymundo MD  4/24/2022 10:52 EDT      Much of the above report is an electronic transcription/translation of the spoken language to printed text using Dragon Software. As such, the subtleties and finesse of the spoken language may permit erroneous, or at times, nonsensical words or phrases to be inadvertently transcribed; thus changes may be made at a later date to rectify these errors.         "

## 2022-04-22 ENCOUNTER — HOSPITAL ENCOUNTER (OUTPATIENT)
Dept: CARDIOLOGY | Facility: HOSPITAL | Age: 72
Discharge: HOME | End: 2022-04-22

## 2022-04-22 ENCOUNTER — LAB (OUTPATIENT)
Dept: LAB | Facility: HOSPITAL | Age: 72
End: 2022-04-22

## 2022-04-22 VITALS — BODY MASS INDEX: 45 KG/M2 | HEIGHT: 63 IN | WEIGHT: 254 LBS

## 2022-04-22 DIAGNOSIS — I50.22 CHRONIC HFREF (HEART FAILURE WITH REDUCED EJECTION FRACTION): ICD-10-CM

## 2022-04-22 DIAGNOSIS — Z98.61 CAD S/P PERCUTANEOUS CORONARY ANGIOPLASTY: ICD-10-CM

## 2022-04-22 DIAGNOSIS — E66.01 MORBID OBESITY WITH BMI OF 40.0-44.9, ADULT: ICD-10-CM

## 2022-04-22 DIAGNOSIS — E11.9 TYPE 2 DIABETES MELLITUS WITHOUT COMPLICATION, WITHOUT LONG-TERM CURRENT USE OF INSULIN: ICD-10-CM

## 2022-04-22 DIAGNOSIS — I34.0 SEVERE MITRAL REGURGITATION: ICD-10-CM

## 2022-04-22 DIAGNOSIS — I25.10 CAD S/P PERCUTANEOUS CORONARY ANGIOPLASTY: ICD-10-CM

## 2022-04-22 DIAGNOSIS — I07.1 SEVERE TRICUSPID REGURGITATION: ICD-10-CM

## 2022-04-22 LAB
ALBUMIN SERPL-MCNC: 4.2 G/DL (ref 3.5–5.2)
ALBUMIN/GLOB SERPL: 1.3 G/DL
ALP SERPL-CCNC: 111 U/L (ref 39–117)
ALT SERPL W P-5'-P-CCNC: 11 U/L (ref 1–33)
ANION GAP SERPL CALCULATED.3IONS-SCNC: 12 MMOL/L (ref 5–15)
AST SERPL-CCNC: 19 U/L (ref 1–32)
BILIRUB SERPL-MCNC: 0.4 MG/DL (ref 0–1.2)
BUN SERPL-MCNC: 22 MG/DL (ref 8–23)
BUN/CREAT SERPL: 18 (ref 7–25)
CALCIUM SPEC-SCNC: 9.5 MG/DL (ref 8.6–10.5)
CHLORIDE SERPL-SCNC: 106 MMOL/L (ref 98–107)
CHOLEST SERPL-MCNC: 97 MG/DL (ref 0–200)
CO2 SERPL-SCNC: 22 MMOL/L (ref 22–29)
CREAT SERPL-MCNC: 1.22 MG/DL (ref 0.57–1)
EGFRCR SERPLBLD CKD-EPI 2021: 47.8 ML/MIN/1.73
GLOBULIN UR ELPH-MCNC: 3.2 GM/DL
GLUCOSE SERPL-MCNC: 91 MG/DL (ref 65–99)
HDLC SERPL-MCNC: 40 MG/DL (ref 40–60)
LDLC SERPL CALC-MCNC: 39 MG/DL (ref 0–100)
LDLC/HDLC SERPL: 0.98 {RATIO}
POTASSIUM SERPL-SCNC: 4.4 MMOL/L (ref 3.5–5.2)
PROT SERPL-MCNC: 7.4 G/DL (ref 6–8.5)
SODIUM SERPL-SCNC: 140 MMOL/L (ref 136–145)
TRIGL SERPL-MCNC: 89 MG/DL (ref 0–150)
VLDLC SERPL-MCNC: 18 MG/DL (ref 5–40)

## 2022-04-22 PROCEDURE — 93306 TTE W/DOPPLER COMPLETE: CPT | Performed by: INTERNAL MEDICINE

## 2022-04-22 PROCEDURE — 36415 COLL VENOUS BLD VENIPUNCTURE: CPT

## 2022-04-22 PROCEDURE — 80053 COMPREHEN METABOLIC PANEL: CPT

## 2022-04-22 PROCEDURE — 93306 TTE W/DOPPLER COMPLETE: CPT

## 2022-04-22 PROCEDURE — 80061 LIPID PANEL: CPT

## 2022-04-27 LAB
BH CV ECHO MEAS - ACS: 2.29 CM
BH CV ECHO MEAS - AO MAX PG: 6.2 MMHG
BH CV ECHO MEAS - AO MEAN PG: 3.9 MMHG
BH CV ECHO MEAS - AO ROOT DIAM: 3.2 CM
BH CV ECHO MEAS - AO V2 MAX: 124.9 CM/SEC
BH CV ECHO MEAS - AO V2 VTI: 24.3 CM
BH CV ECHO MEAS - AVA(I,D): 2.48 CM2
BH CV ECHO MEAS - EDV(CUBED): 95.5 ML
BH CV ECHO MEAS - EDV(MOD-SP4): 82.7 ML
BH CV ECHO MEAS - EF(MOD-SP4): 57.8 %
BH CV ECHO MEAS - ESV(CUBED): 44.9 ML
BH CV ECHO MEAS - ESV(MOD-SP4): 34.9 ML
BH CV ECHO MEAS - FS: 22.3 %
BH CV ECHO MEAS - IVS/LVPW: 1.05 CM
BH CV ECHO MEAS - IVSD: 0.88 CM
BH CV ECHO MEAS - LA DIMENSION: 4 CM
BH CV ECHO MEAS - LV MASS(C)D: 129 GRAMS
BH CV ECHO MEAS - LV MAX PG: 4.1 MMHG
BH CV ECHO MEAS - LV MEAN PG: 1.94 MMHG
BH CV ECHO MEAS - LV V1 MAX: 101.3 CM/SEC
BH CV ECHO MEAS - LV V1 VTI: 19.4 CM
BH CV ECHO MEAS - LVIDD: 4.6 CM
BH CV ECHO MEAS - LVIDS: 3.6 CM
BH CV ECHO MEAS - LVOT AREA: 3.1 CM2
BH CV ECHO MEAS - LVOT DIAM: 1.99 CM
BH CV ECHO MEAS - LVPWD: 0.84 CM
BH CV ECHO MEAS - MV A MAX VEL: 120.9 CM/SEC
BH CV ECHO MEAS - MV DEC SLOPE: 428 CM/SEC2
BH CV ECHO MEAS - MV DEC TIME: 0.2 MSEC
BH CV ECHO MEAS - MV E MAX VEL: 85.8 CM/SEC
BH CV ECHO MEAS - MV E/A: 0.71
BH CV ECHO MEAS - PA V2 MAX: 87 CM/SEC
BH CV ECHO MEAS - RAP SYSTOLE: 3 MMHG
BH CV ECHO MEAS - RVDD: 3.7 CM
BH CV ECHO MEAS - RVSP: 30.5 MMHG
BH CV ECHO MEAS - SV(LVOT): 60.3 ML
BH CV ECHO MEAS - SV(MOD-SP4): 47.8 ML
BH CV ECHO MEAS - TR MAX PG: 27.5 MMHG
BH CV ECHO MEAS - TR MAX VEL: 261.9 CM/SEC
MAXIMAL PREDICTED HEART RATE: 150 BPM
STRESS TARGET HR: 128 BPM

## 2022-05-11 ENCOUNTER — OFFICE VISIT (OUTPATIENT)
Dept: FAMILY MEDICINE CLINIC | Facility: CLINIC | Age: 72
End: 2022-05-11

## 2022-05-11 VITALS
SYSTOLIC BLOOD PRESSURE: 106 MMHG | OXYGEN SATURATION: 100 % | WEIGHT: 247 LBS | HEIGHT: 63 IN | TEMPERATURE: 97.7 F | HEART RATE: 96 BPM | BODY MASS INDEX: 43.77 KG/M2 | DIASTOLIC BLOOD PRESSURE: 72 MMHG

## 2022-05-11 DIAGNOSIS — M65.341 TRIGGER FINGER, RIGHT RING FINGER: Primary | ICD-10-CM

## 2022-05-11 DIAGNOSIS — Z79.01 LONG TERM (CURRENT) USE OF ANTICOAGULANTS: ICD-10-CM

## 2022-05-11 DIAGNOSIS — I50.42 CHRONIC COMBINED SYSTOLIC AND DIASTOLIC CONGESTIVE HEART FAILURE: ICD-10-CM

## 2022-05-11 LAB — INR PPP: 1.7 (ref 2–3)

## 2022-05-11 PROCEDURE — 99213 OFFICE O/P EST LOW 20 MIN: CPT | Performed by: FAMILY MEDICINE

## 2022-05-11 PROCEDURE — 36416 COLLJ CAPILLARY BLOOD SPEC: CPT | Performed by: FAMILY MEDICINE

## 2022-05-11 PROCEDURE — 85610 PROTHROMBIN TIME: CPT | Performed by: FAMILY MEDICINE

## 2022-05-11 RX ORDER — ALLOPURINOL 100 MG/1
100 TABLET ORAL DAILY
Qty: 90 TABLET | Refills: 1 | Status: SHIPPED | OUTPATIENT
Start: 2022-05-11 | End: 2022-05-11 | Stop reason: SDUPTHER

## 2022-05-11 RX ORDER — ALLOPURINOL 100 MG/1
100 TABLET ORAL DAILY
Qty: 90 TABLET | Refills: 1 | Status: CANCELLED | OUTPATIENT
Start: 2022-05-11

## 2022-05-11 RX ORDER — ALLOPURINOL 100 MG/1
100 TABLET ORAL DAILY
Qty: 14 TABLET | Refills: 0 | Status: SHIPPED | OUTPATIENT
Start: 2022-05-11 | End: 2022-05-16 | Stop reason: SDUPTHER

## 2022-05-11 NOTE — TELEPHONE ENCOUNTER
Caller: [facility] - [place], [place] - [address] - [phone] [place] [phone] FX    Relationship: Pharmacy    Best call back number: [phone]    Requested Prescriptions:   Requested Prescriptions     Pending Prescriptions Disp Refills   • allopurinol (ZYLOPRIM) 100 MG tablet 90 tablet 1     Sig: Take 1 tablet by mouth Daily.        Pharmacy where request should be sent:  [facility] [place] [phone] [place] [phone] FX     Additional details provided by patient:MAIL ORDER IS CALLING IN THEY ARE ASKING IF THEY GET PATIENT SHORT SUPPLY SENT TO LOCAL PHARMACY WHILE SHE WAITS FOR MAIL ORDER SHE IS OUT OF THIS MEDICATION.    Does the patient have less than a 3 day supply:  [x] Yes  [] No    Nury Edward, PCT   05/11/22 15:45 EDT

## 2022-05-11 NOTE — PROGRESS NOTES
Subjective   Jeanna Parks is a 71 y.o. female.     History of Present Illness     The patient is present today with complaints of finger pain and swelling. She admits the right fourth finger has been bothering her for going on 2 weeks now. She denies trauma    The following portions of the patient's history were reviewed and updated as appropriate: allergies, current medications, past family history, past medical history, past social history, past surgical history, and problem list.  Past Medical History:   Diagnosis Date   • Arthritis    • Congestive heart failure ([facility]) 12/30/2020   • Gout    • Hypertension 9/3/2015   • Sciatica    • Type 2 diabetes mellitus without complication, without long-term current use of insulin ([facility]) 9/3/2019    glucometer, test strips and lancets sent new onset start metformin DM education encouraged weight loss     Past Surgical History:   Procedure Laterality Date   • CARDIAC CATHETERIZATION N/A 2/22/2021    Procedure: Cardiac Catheterization/Vascular Study;  Surgeon: Kit Jacques MD;  Location: [facility] CATH INVASIVE LOCATION;  Service: Cardiovascular;  Laterality: N/A;   • CARDIAC CATHETERIZATION N/A 5/26/2021    Procedure: Left Heart Cath,impella assisted PCI;  Surgeon: Itz Raymundo MD;  Location: [facility] CATH INVASIVE LOCATION;  Service: Cardiovascular;  Laterality: N/A;   • CARDIAC CATHETERIZATION N/A 5/26/2021    Procedure: Stent HADLEY coronary;  Surgeon: Itz Raymundo MD;  Location: [facility] CATH INVASIVE LOCATION;  Service: Cardiovascular;  Laterality: N/A;   • CORONARY ANGIOPLASTY  05/26/2021    1x stent to LAD     Family History   Problem Relation Age of Onset   • Heart disease Mother    • Heart failure Mother    • Heart disease Sister    • Heart failure Sister    • Heart disease Sister    • Heart failure Sister      Social History     Socioeconomic History   • Marital status:    Tobacco Use   • Smoking status: Former Smoker      "Types: Cigarettes     Quit date: 2020     Years since quittin.4   • Smokeless tobacco: Never Used   • Tobacco comment: quit a month ago   Vaping Use   • Vaping Use: Never used   Substance and Sexual Activity   • Alcohol use: No   • Drug use: No   • Sexual activity: Defer         Current Outpatient Medications:   •  albuterol sulfate  (90 Base) MCG/ACT inhaler, Inhale 2 puffs Every 4 (Four) Hours As Needed for Wheezing., Disp: 18 g, Rfl: 5  •  allopurinol (ZYLOPRIM) 100 MG tablet, Take 1 tablet by mouth Daily., Disp: 14 tablet, Rfl: 0  •  atorvastatin (Lipitor) 40 MG tablet, Take 1 tablet by mouth Every Night., Disp: 90 tablet, Rfl: 3  •  budesonide-formoterol (Symbicort) 80-4.5 MCG/ACT inhaler, Inhale 2 puffs 2 (Two) Times a Day., Disp: 10.2 g, Rfl: 5  •  lisinopril (PRINIVIL,ZESTRIL) 2.5 MG tablet, Take 1 tablet by mouth Daily., Disp: 90 tablet, Rfl: 3  •  metFORMIN (GLUCOPHAGE) 500 MG tablet, Take 1 tablet by mouth 2 (Two) Times a Day With Meals., Disp: 180 tablet, Rfl: 1  •  metoprolol succinate XL (TOPROL-XL) 25 MG 24 hr tablet, Take 1 tablet by mouth Daily., Disp: 90 tablet, Rfl: 3  •  spironolactone (ALDACTONE) 25 MG tablet, Take 1 tablet by mouth Daily., Disp: 90 tablet, Rfl: 3  •  ticagrelor (BRILINTA) 90 MG tablet tablet, Take 1 tablet by mouth 2 (Two) Times a Day., Disp: 180 tablet, Rfl: 3  •  torsemide (DEMADEX) 20 MG tablet, Take 1 tablet by mouth Daily., Disp: 90 tablet, Rfl: 3  •  warfarin (COUMADIN) 5 MG tablet, Take 1-1/2 pills on  and 1 pill on , Disp: 110 tablet, Rfl: 1    Review of Systems   Review of systems was performed, and pertinent findings are noted in the HPI.    /72 (BP Location: Left arm, Patient Position: Sitting, Cuff Size: Large Adult)   Pulse 96   Temp 97.7 °F (36.5 °C) (Temporal)   Ht 160 cm (63\")   Wt 112 kg (247 lb)   LMP  (LMP Unknown)   SpO2 100%   Breastfeeding No   BMI 43.75 kg/m² "       Objective   Physical Exam  Vitals and nursing note reviewed.   Constitutional:       General: She is not in acute distress.     Appearance: She is well-developed. She is morbidly obese.   HENT:      Head: Normocephalic and atraumatic.   Neck:      Thyroid: No thyromegaly.   Cardiovascular:      Rate and Rhythm: Normal rate and regular rhythm.      Pulses:           Radial pulses are 2+ on the right side and 2+ on the left side.      Heart sounds: Normal heart sounds. No murmur heard.    No friction rub. No gallop.      Comments: Her radial and ulnar pulses are 2+ and symmetric.     Pulmonary:      Effort: Pulmonary effort is normal. No respiratory distress.      Breath sounds: Normal breath sounds. No wheezing or rales.   Musculoskeletal:      Right hand: No swelling.      Comments: Her  strength is normal. She does have some popping and a trigger finger involving the right fourth finger. There is no cyanosis.   Skin:     General: Skin is warm and dry.   Neurological:      Mental Status: She is alert.   Psychiatric:         Behavior: Behavior is cooperative.           Assessment & Plan   Problems Addressed this Visit    None     Visit Diagnoses     Trigger finger, right ring finger    -  Primary    Relevant Orders    Ambulatory Referral to Orthopedic Surgery (Completed)    POC INR (Completed)      Diagnoses       Codes Comments    Trigger finger, right ring finger    -  Primary ICD-10-CM: M65.341  ICD-9-CM: 727.03         1.Pain right fourth finger, trigger finger:  - I will get her in to see [facility], the hand specialist, for further evaluation and treatment as this situation is creating some pain for her.    2. Chronic anticoagulation:   - Her INR is not currently therapeutic. She is currently on 7.5 mg on Monday, Wednesday, Friday and 5 mg on Tuesday, Thursday, Saturday, Sunday, so I will reverse that, and she will take 7.5 mg Tuesday, Thursday, Saturday, Sunday and 5 mg on Monday, Wednesday,  Friday, and she will have a repeat INR in a week.        Transcribed from ambient dictation for Latanya Hawk MD by Sari Batres.  05/11/22   16:59 EDT    Patient verbalized consent to the visit recording.

## 2022-05-16 RX ORDER — ALLOPURINOL 100 MG/1
100 TABLET ORAL DAILY
Qty: 14 TABLET | Refills: 0 | Status: SHIPPED | OUTPATIENT
Start: 2022-05-16 | End: 2023-02-06 | Stop reason: SDUPTHER

## 2022-05-18 ENCOUNTER — CLINICAL SUPPORT (OUTPATIENT)
Dept: FAMILY MEDICINE CLINIC | Facility: CLINIC | Age: 72
End: 2022-05-18

## 2022-05-18 DIAGNOSIS — I26.09 ACUTE PULMONARY EMBOLISM WITH ACUTE COR PULMONALE, UNSPECIFIED PULMONARY EMBOLISM TYPE: ICD-10-CM

## 2022-05-18 DIAGNOSIS — Z79.01 LONG TERM (CURRENT) USE OF ANTICOAGULANTS: Primary | ICD-10-CM

## 2022-05-18 LAB — INR PPP: 1.8 (ref 2–3)

## 2022-05-18 PROCEDURE — 36416 COLLJ CAPILLARY BLOOD SPEC: CPT | Performed by: FAMILY MEDICINE

## 2022-05-18 PROCEDURE — 85610 PROTHROMBIN TIME: CPT | Performed by: FAMILY MEDICINE

## 2022-05-25 ENCOUNTER — ANTICOAGULATION VISIT (OUTPATIENT)
Dept: FAMILY MEDICINE CLINIC | Facility: CLINIC | Age: 72
End: 2022-05-25

## 2022-05-25 DIAGNOSIS — Z79.01 LONG TERM (CURRENT) USE OF ANTICOAGULANTS: Primary | ICD-10-CM

## 2022-05-25 LAB — INR PPP: 2 (ref 2–3)

## 2022-05-25 PROCEDURE — 85610 PROTHROMBIN TIME: CPT | Performed by: FAMILY MEDICINE

## 2022-05-25 PROCEDURE — 36416 COLLJ CAPILLARY BLOOD SPEC: CPT | Performed by: FAMILY MEDICINE

## 2022-06-14 NOTE — TELEPHONE ENCOUNTER
Rx Refill Note  Requested Prescriptions      No prescriptions requested or ordered in this encounter      Last office visit with prescribing clinician: 4/18/2022      Next office visit with prescribing clinician: 4/18/2023            Socorro Huff MA  06/14/22, 14:36 EDT

## 2022-06-14 NOTE — TELEPHONE ENCOUNTER
Rx Refill Note  Requested Prescriptions      No prescriptions requested or ordered in this encounter      Last office visit with prescribing clinician: 4/18/2022      Next office visit with prescribing clinician: 4/18/2023            Socorro Huff MA  06/14/22, 14:48 EDT

## 2022-06-17 NOTE — TELEPHONE ENCOUNTER
Rx Refill Note  Requested Prescriptions     Pending Prescriptions Disp Refills   • ticagrelor (BRILINTA) 90 MG tablet tablet 180 tablet 3     Sig: Take 1 tablet by mouth 2 (Two) Times a Day.      Last office visit with prescribing clinician: 4/18/2022      Next office visit with prescribing clinician: 4/18/2023            Socorro Huff MA  06/17/22, 13:41 EDT

## 2022-06-28 ENCOUNTER — ANTICOAGULATION VISIT (OUTPATIENT)
Dept: FAMILY MEDICINE CLINIC | Facility: CLINIC | Age: 72
End: 2022-06-28

## 2022-06-28 DIAGNOSIS — I26.09 ACUTE PULMONARY EMBOLISM WITH ACUTE COR PULMONALE, UNSPECIFIED PULMONARY EMBOLISM TYPE: Primary | ICD-10-CM

## 2022-06-28 LAB — INR PPP: 3.3 (ref 0.9–1.1)

## 2022-06-28 PROCEDURE — 85610 PROTHROMBIN TIME: CPT | Performed by: FAMILY MEDICINE

## 2022-06-28 PROCEDURE — 36416 COLLJ CAPILLARY BLOOD SPEC: CPT | Performed by: FAMILY MEDICINE

## 2022-07-21 NOTE — TELEPHONE ENCOUNTER
Pt is needing an RX for Brilinta to be sent to [facility]. Form received, rx needs signed, then it can be faxed.

## 2022-07-28 ENCOUNTER — ANTICOAGULATION VISIT (OUTPATIENT)
Dept: FAMILY MEDICINE CLINIC | Facility: CLINIC | Age: 72
End: 2022-07-28

## 2022-07-28 DIAGNOSIS — I26.09 ACUTE PULMONARY EMBOLISM WITH ACUTE COR PULMONALE, UNSPECIFIED PULMONARY EMBOLISM TYPE: Primary | ICD-10-CM

## 2022-07-28 DIAGNOSIS — Z79.01 LONG TERM (CURRENT) USE OF ANTICOAGULANTS: ICD-10-CM

## 2022-07-28 LAB — INR PPP: 2 (ref 0.9–1.1)

## 2022-07-28 PROCEDURE — 85610 PROTHROMBIN TIME: CPT | Performed by: FAMILY MEDICINE

## 2022-07-28 PROCEDURE — 36416 COLLJ CAPILLARY BLOOD SPEC: CPT | Performed by: FAMILY MEDICINE

## 2022-08-24 ENCOUNTER — LAB (OUTPATIENT)
Dept: LAB | Facility: HOSPITAL | Age: 72
End: 2022-08-24

## 2022-08-24 ENCOUNTER — TRANSCRIBE ORDERS (OUTPATIENT)
Dept: ADMINISTRATIVE | Facility: HOSPITAL | Age: 72
End: 2022-08-24

## 2022-08-24 ENCOUNTER — HOSPITAL ENCOUNTER (OUTPATIENT)
Dept: CARDIOLOGY | Facility: HOSPITAL | Age: 72
Discharge: HOME | End: 2022-08-24

## 2022-08-24 DIAGNOSIS — Z01.818 PRE-OP TESTING: Primary | ICD-10-CM

## 2022-08-24 DIAGNOSIS — Z01.818 PRE-OP TESTING: ICD-10-CM

## 2022-08-24 LAB
ALBUMIN SERPL-MCNC: 4 G/DL (ref 3.5–5.2)
ANION GAP SERPL CALCULATED.3IONS-SCNC: 11 MMOL/L (ref 5–15)
BASOPHILS # BLD AUTO: 0.03 10*3/MM3 (ref 0–0.2)
BASOPHILS NFR BLD AUTO: 0.4 % (ref 0–1.5)
BUN SERPL-MCNC: 19 MG/DL (ref 8–23)
BUN/CREAT SERPL: 18.4 (ref 7–25)
CALCIUM SPEC-SCNC: 9.5 MG/DL (ref 8.6–10.5)
CHLORIDE SERPL-SCNC: 105 MMOL/L (ref 98–107)
CO2 SERPL-SCNC: 24 MMOL/L (ref 22–29)
CREAT SERPL-MCNC: 1.03 MG/DL (ref 0.57–1)
DEPRECATED RDW RBC AUTO: 42.5 FL (ref 37–54)
EGFRCR SERPLBLD CKD-EPI 2021: 58.3 ML/MIN/1.73
EOSINOPHIL # BLD AUTO: 0 10*3/MM3 (ref 0–0.4)
EOSINOPHIL NFR BLD AUTO: 0 % (ref 0.3–6.2)
ERYTHROCYTE [DISTWIDTH] IN BLOOD BY AUTOMATED COUNT: 12.3 % (ref 12.3–15.4)
GLUCOSE SERPL-MCNC: 106 MG/DL (ref 65–99)
HBA1C MFR BLD: 4.5 % (ref 3.5–5.6)
HCT VFR BLD AUTO: 30.8 % (ref 34–46.6)
HGB BLD-MCNC: 10 G/DL (ref 12–15.9)
IMM GRANULOCYTES # BLD AUTO: 0.11 10*3/MM3 (ref 0–0.05)
IMM GRANULOCYTES NFR BLD AUTO: 1.4 % (ref 0–0.5)
LYMPHOCYTES # BLD AUTO: 1.64 10*3/MM3 (ref 0.7–3.1)
LYMPHOCYTES NFR BLD AUTO: 21.1 % (ref 19.6–45.3)
MCH RBC QN AUTO: 30.7 PG (ref 26.6–33)
MCHC RBC AUTO-ENTMCNC: 32.5 G/DL (ref 31.5–35.7)
MCV RBC AUTO: 94.5 FL (ref 79–97)
MONOCYTES # BLD AUTO: 0.59 10*3/MM3 (ref 0.1–0.9)
MONOCYTES NFR BLD AUTO: 7.6 % (ref 5–12)
MRSA DNA SPEC QL NAA+PROBE: NORMAL
NEUTROPHILS NFR BLD AUTO: 5.42 10*3/MM3 (ref 1.7–7)
NEUTROPHILS NFR BLD AUTO: 69.5 % (ref 42.7–76)
NRBC BLD AUTO-RTO: 0 /100 WBC (ref 0–0.2)
PLATELET # BLD AUTO: 219 10*3/MM3 (ref 140–450)
PMV BLD AUTO: 12.2 FL (ref 6–12)
POTASSIUM SERPL-SCNC: 4.2 MMOL/L (ref 3.5–5.2)
RBC # BLD AUTO: 3.26 10*6/MM3 (ref 3.77–5.28)
SODIUM SERPL-SCNC: 140 MMOL/L (ref 136–145)
WBC NRBC COR # BLD: 7.79 10*3/MM3 (ref 3.4–10.8)

## 2022-08-24 PROCEDURE — 87641 MR-STAPH DNA AMP PROBE: CPT

## 2022-08-24 PROCEDURE — 83036 HEMOGLOBIN GLYCOSYLATED A1C: CPT

## 2022-08-24 PROCEDURE — 80048 BASIC METABOLIC PNL TOTAL CA: CPT

## 2022-08-24 PROCEDURE — 93010 ELECTROCARDIOGRAM REPORT: CPT | Performed by: INTERNAL MEDICINE

## 2022-08-24 PROCEDURE — 82040 ASSAY OF SERUM ALBUMIN: CPT

## 2022-08-24 PROCEDURE — 93005 ELECTROCARDIOGRAM TRACING: CPT | Performed by: ORTHOPAEDIC SURGERY

## 2022-08-24 PROCEDURE — 36415 COLL VENOUS BLD VENIPUNCTURE: CPT

## 2022-08-24 PROCEDURE — 85025 COMPLETE CBC W/AUTO DIFF WBC: CPT

## 2022-08-25 ENCOUNTER — ANTICOAGULATION VISIT (OUTPATIENT)
Dept: FAMILY MEDICINE CLINIC | Facility: CLINIC | Age: 72
End: 2022-08-25

## 2022-08-25 DIAGNOSIS — I26.09 ACUTE PULMONARY EMBOLISM WITH ACUTE COR PULMONALE, UNSPECIFIED PULMONARY EMBOLISM TYPE: Primary | ICD-10-CM

## 2022-08-25 DIAGNOSIS — Z79.01 LONG TERM (CURRENT) USE OF ANTICOAGULANTS: ICD-10-CM

## 2022-08-25 LAB
INR PPP: 1.7 (ref 0.9–1.1)
QT INTERVAL: 356 MS

## 2022-08-25 PROCEDURE — 85610 PROTHROMBIN TIME: CPT | Performed by: FAMILY MEDICINE

## 2022-08-25 PROCEDURE — 36416 COLLJ CAPILLARY BLOOD SPEC: CPT | Performed by: FAMILY MEDICINE

## 2022-08-29 RX ORDER — WARFARIN SODIUM 5 MG/1
TABLET ORAL
Qty: 110 TABLET | Refills: 0 | Status: SHIPPED | OUTPATIENT
Start: 2022-08-29 | End: 2022-11-04

## 2022-09-06 ENCOUNTER — TELEPHONE (OUTPATIENT)
Dept: FAMILY MEDICINE CLINIC | Facility: CLINIC | Age: 72
End: 2022-09-06

## 2022-09-06 NOTE — TELEPHONE ENCOUNTER
I received word from her orthopedist that her surgery had to be postponed secondary to her hemoglobin.  This has been a chronic problem for her.  She needs to make an appointment and see her hematologist.  She usually sees Dr. Falcon.  I think she missed her last appointment.

## 2022-09-08 ENCOUNTER — ANTICOAGULATION VISIT (OUTPATIENT)
Dept: FAMILY MEDICINE CLINIC | Facility: CLINIC | Age: 72
End: 2022-09-08

## 2022-09-08 DIAGNOSIS — Z79.01 LONG TERM (CURRENT) USE OF ANTICOAGULANTS: ICD-10-CM

## 2022-09-08 DIAGNOSIS — I26.09 ACUTE PULMONARY EMBOLISM WITH ACUTE COR PULMONALE, UNSPECIFIED PULMONARY EMBOLISM TYPE: Primary | ICD-10-CM

## 2022-09-08 LAB — INR PPP: 5.4 (ref 0.9–1.1)

## 2022-09-08 PROCEDURE — 85610 PROTHROMBIN TIME: CPT | Performed by: FAMILY MEDICINE

## 2022-09-08 PROCEDURE — 36416 COLLJ CAPILLARY BLOOD SPEC: CPT | Performed by: FAMILY MEDICINE

## 2022-09-22 ENCOUNTER — ANTICOAGULATION VISIT (OUTPATIENT)
Dept: FAMILY MEDICINE CLINIC | Facility: CLINIC | Age: 72
End: 2022-09-22

## 2022-09-22 DIAGNOSIS — Z79.01 LONG TERM (CURRENT) USE OF ANTICOAGULANTS: Primary | ICD-10-CM

## 2022-09-22 LAB — INR PPP: 2.8 (ref 2–3)

## 2022-09-22 PROCEDURE — 85610 PROTHROMBIN TIME: CPT | Performed by: FAMILY MEDICINE

## 2022-09-22 PROCEDURE — 36416 COLLJ CAPILLARY BLOOD SPEC: CPT | Performed by: FAMILY MEDICINE

## 2022-10-25 ENCOUNTER — ANTICOAGULATION VISIT (OUTPATIENT)
Dept: FAMILY MEDICINE CLINIC | Facility: CLINIC | Age: 72
End: 2022-10-25

## 2022-10-25 DIAGNOSIS — I26.09 ACUTE PULMONARY EMBOLISM WITH ACUTE COR PULMONALE, UNSPECIFIED PULMONARY EMBOLISM TYPE: ICD-10-CM

## 2022-10-25 DIAGNOSIS — Z79.01 LONG TERM (CURRENT) USE OF ANTICOAGULANTS: Primary | ICD-10-CM

## 2022-10-25 LAB — INR PPP: 2.5 (ref 2–3)

## 2022-10-25 PROCEDURE — 85610 PROTHROMBIN TIME: CPT | Performed by: FAMILY MEDICINE

## 2022-10-25 PROCEDURE — 36416 COLLJ CAPILLARY BLOOD SPEC: CPT | Performed by: FAMILY MEDICINE

## 2022-11-04 RX ORDER — WARFARIN SODIUM 5 MG/1
TABLET ORAL
Qty: 110 TABLET | Refills: 0 | Status: SHIPPED | OUTPATIENT
Start: 2022-11-04 | End: 2022-11-29 | Stop reason: SDUPTHER

## 2022-11-29 ENCOUNTER — ANTICOAGULATION VISIT (OUTPATIENT)
Dept: FAMILY MEDICINE CLINIC | Facility: CLINIC | Age: 72
End: 2022-11-29

## 2022-11-29 DIAGNOSIS — Z79.01 LONG TERM (CURRENT) USE OF ANTICOAGULANTS: Primary | ICD-10-CM

## 2022-11-29 LAB — INR PPP: 3.3 (ref 2–3)

## 2022-11-29 PROCEDURE — 36416 COLLJ CAPILLARY BLOOD SPEC: CPT | Performed by: FAMILY MEDICINE

## 2022-11-29 PROCEDURE — 85610 PROTHROMBIN TIME: CPT | Performed by: FAMILY MEDICINE

## 2022-11-29 RX ORDER — WARFARIN SODIUM 5 MG/1
TABLET ORAL
Qty: 110 TABLET | Refills: 0
Start: 2022-11-29 | End: 2023-02-06 | Stop reason: SDUPTHER

## 2022-12-14 ENCOUNTER — ANTICOAGULATION VISIT (OUTPATIENT)
Dept: FAMILY MEDICINE CLINIC | Facility: CLINIC | Age: 72
End: 2022-12-14

## 2022-12-14 DIAGNOSIS — Z79.01 LONG TERM (CURRENT) USE OF ANTICOAGULANTS: Primary | ICD-10-CM

## 2022-12-14 DIAGNOSIS — I26.09 ACUTE PULMONARY EMBOLISM WITH ACUTE COR PULMONALE, UNSPECIFIED PULMONARY EMBOLISM TYPE: ICD-10-CM

## 2022-12-14 LAB — INR PPP: 2.85 (ref 2–3)

## 2022-12-14 PROCEDURE — 36416 COLLJ CAPILLARY BLOOD SPEC: CPT | Performed by: FAMILY MEDICINE

## 2022-12-14 PROCEDURE — 85610 PROTHROMBIN TIME: CPT | Performed by: FAMILY MEDICINE

## 2023-01-17 ENCOUNTER — ANTICOAGULATION VISIT (OUTPATIENT)
Dept: FAMILY MEDICINE CLINIC | Facility: CLINIC | Age: 73
End: 2023-01-17
Payer: MEDICARE

## 2023-01-17 DIAGNOSIS — Z79.01 LONG TERM (CURRENT) USE OF ANTICOAGULANTS: Primary | ICD-10-CM

## 2023-01-17 LAB — INR PPP: 1.7 (ref 2–3)

## 2023-01-17 PROCEDURE — 36416 COLLJ CAPILLARY BLOOD SPEC: CPT | Performed by: FAMILY MEDICINE

## 2023-01-17 PROCEDURE — 85610 PROTHROMBIN TIME: CPT | Performed by: FAMILY MEDICINE

## 2023-01-20 ENCOUNTER — LAB (OUTPATIENT)
Dept: LAB | Facility: HOSPITAL | Age: 73
End: 2023-01-20
Payer: MEDICARE

## 2023-01-20 ENCOUNTER — TRANSCRIBE ORDERS (OUTPATIENT)
Dept: ADMINISTRATIVE | Facility: HOSPITAL | Age: 73
End: 2023-01-20
Payer: MEDICARE

## 2023-01-20 DIAGNOSIS — Z01.818 PRE-OP EXAMINATION: Primary | ICD-10-CM

## 2023-01-20 DIAGNOSIS — Z01.818 PRE-OP EXAMINATION: ICD-10-CM

## 2023-01-20 LAB
BASOPHILS # BLD AUTO: 0.04 10*3/MM3 (ref 0–0.2)
BASOPHILS NFR BLD AUTO: 0.5 % (ref 0–1.5)
DEPRECATED RDW RBC AUTO: 43.1 FL (ref 37–54)
EOSINOPHIL # BLD AUTO: 0 10*3/MM3 (ref 0–0.4)
EOSINOPHIL NFR BLD AUTO: 0 % (ref 0.3–6.2)
ERYTHROCYTE [DISTWIDTH] IN BLOOD BY AUTOMATED COUNT: 12.7 % (ref 12.3–15.4)
HCT VFR BLD AUTO: 31 % (ref 34–46.6)
HGB BLD-MCNC: 10.2 G/DL (ref 12–15.9)
IMM GRANULOCYTES # BLD AUTO: 0.08 10*3/MM3 (ref 0–0.05)
IMM GRANULOCYTES NFR BLD AUTO: 1.1 % (ref 0–0.5)
LYMPHOCYTES # BLD AUTO: 1.67 10*3/MM3 (ref 0.7–3.1)
LYMPHOCYTES NFR BLD AUTO: 21.9 % (ref 19.6–45.3)
MCH RBC QN AUTO: 30.7 PG (ref 26.6–33)
MCHC RBC AUTO-ENTMCNC: 32.9 G/DL (ref 31.5–35.7)
MCV RBC AUTO: 93.4 FL (ref 79–97)
MONOCYTES # BLD AUTO: 0.68 10*3/MM3 (ref 0.1–0.9)
MONOCYTES NFR BLD AUTO: 8.9 % (ref 5–12)
NEUTROPHILS NFR BLD AUTO: 5.14 10*3/MM3 (ref 1.7–7)
NEUTROPHILS NFR BLD AUTO: 67.6 % (ref 42.7–76)
NRBC BLD AUTO-RTO: 0.1 /100 WBC (ref 0–0.2)
PLATELET # BLD AUTO: 224 10*3/MM3 (ref 140–450)
PMV BLD AUTO: 11.9 FL (ref 6–12)
RBC # BLD AUTO: 3.32 10*6/MM3 (ref 3.77–5.28)
WBC NRBC COR # BLD: 7.61 10*3/MM3 (ref 3.4–10.8)

## 2023-01-20 PROCEDURE — 85025 COMPLETE CBC W/AUTO DIFF WBC: CPT

## 2023-01-20 PROCEDURE — 36415 COLL VENOUS BLD VENIPUNCTURE: CPT

## 2023-01-31 ENCOUNTER — ANTICOAGULATION VISIT (OUTPATIENT)
Dept: FAMILY MEDICINE CLINIC | Facility: CLINIC | Age: 73
End: 2023-01-31
Payer: MEDICARE

## 2023-01-31 DIAGNOSIS — Z79.01 LONG TERM (CURRENT) USE OF ANTICOAGULANTS: Primary | ICD-10-CM

## 2023-01-31 LAB — INR PPP: 1.7 (ref 0.9–1.1)

## 2023-01-31 PROCEDURE — 85610 PROTHROMBIN TIME: CPT | Performed by: FAMILY MEDICINE

## 2023-01-31 PROCEDURE — 36416 COLLJ CAPILLARY BLOOD SPEC: CPT | Performed by: FAMILY MEDICINE

## 2023-02-06 RX ORDER — ALLOPURINOL 100 MG/1
100 TABLET ORAL DAILY
Qty: 90 TABLET | Refills: 0 | Status: SHIPPED | OUTPATIENT
Start: 2023-02-06

## 2023-02-06 RX ORDER — WARFARIN SODIUM 5 MG/1
TABLET ORAL
Qty: 110 TABLET | Refills: 0
Start: 2023-02-06 | End: 2023-02-11 | Stop reason: SDUPTHER

## 2023-02-11 RX ORDER — WARFARIN SODIUM 5 MG/1
TABLET ORAL
Qty: 110 TABLET | Refills: 0
Start: 2023-02-11 | End: 2023-02-13 | Stop reason: SDUPTHER

## 2023-02-13 RX ORDER — WARFARIN SODIUM 5 MG/1
TABLET ORAL
Qty: 110 TABLET | Refills: 0
Start: 2023-02-13 | End: 2023-02-14 | Stop reason: SDUPTHER

## 2023-02-13 NOTE — TELEPHONE ENCOUNTER
Caller: Quinn Parks Karnes    Relationship: Self    Best call back number: [phone]    Requested Prescriptions:   Requested Prescriptions     Pending Prescriptions Disp Refills   • warfarin (COUMADIN) 5 MG tablet 110 tablet 0     Sig: TAKE 1 AND 1/2 TABLET BY MOUTH DAILY ON Monday AND FRIDAY AND TAKE ONE TABLET BY MOUTH DAILY ON TUESDAY, Wednesday,THURSDAY, SATURDAY AND SUNDAY        Pharmacy where request should be sent: [facility] [id number] [facility] [place]  [address] AT [facility] - [phone]  - [phone] FX     Additional details provided by patient: PATIENT IS OUT OF MEDICATION, WAS GOING TO BE SENT IN OVER THE WEEKEND BY LANDRY PARDO WHO WAS ON CALL, BUT PHARMACY NEVER RECEIVED IT. PLEASE RE-SEND.     Does the patient have less than a 3 day supply:  [x] Yes  [] No    Would you like a call back once the refill request has been completed: [x] Yes [] No    If the office needs to give you a call back, can they leave a voicemail: [x] Yes [] No    Tiffanie Bolaños Rep   02/13/23 09:33 EST

## 2023-02-14 ENCOUNTER — ANTICOAGULATION VISIT (OUTPATIENT)
Dept: FAMILY MEDICINE CLINIC | Facility: CLINIC | Age: 73
End: 2023-02-14
Payer: MEDICARE

## 2023-02-14 ENCOUNTER — DOCUMENTATION (OUTPATIENT)
Dept: FAMILY MEDICINE CLINIC | Facility: CLINIC | Age: 73
End: 2023-02-14
Payer: MEDICARE

## 2023-02-14 ENCOUNTER — TELEPHONE (OUTPATIENT)
Dept: FAMILY MEDICINE CLINIC | Facility: CLINIC | Age: 73
End: 2023-02-14
Payer: MEDICARE

## 2023-02-14 DIAGNOSIS — Z79.01 LONG TERM (CURRENT) USE OF ANTICOAGULANTS: Primary | ICD-10-CM

## 2023-02-14 LAB — INR PPP: 2.1 (ref 2–3)

## 2023-02-14 PROCEDURE — 85610 PROTHROMBIN TIME: CPT | Performed by: FAMILY MEDICINE

## 2023-02-14 PROCEDURE — 36416 COLLJ CAPILLARY BLOOD SPEC: CPT | Performed by: FAMILY MEDICINE

## 2023-02-14 RX ORDER — WARFARIN SODIUM 5 MG/1
TABLET ORAL
Qty: 35 TABLET | Refills: 0 | Status: SHIPPED | OUTPATIENT
Start: 2023-02-14

## 2023-02-14 RX ORDER — WARFARIN SODIUM 5 MG/1
TABLET ORAL
Qty: 110 TABLET | Refills: 0 | Status: SHIPPED | OUTPATIENT
Start: 2023-02-14 | End: 2023-02-14 | Stop reason: SDUPTHER

## 2023-02-14 NOTE — TELEPHONE ENCOUNTER
Caller: Jeanna Brown    Relationship to patient: Self    Best call back number: [phone]    Patient is needing: PATIENT SAID THAT [facility] PHARMACY DID NOT RECEIVE HER WARFARIN REFILL THAT WAS SENT YESTERDAY    SHE IS WANTING TO SEE IF IT NEEDS TO BE RE-SENT    SHE SAID SHE IS COMPLETELY OUT AND WANTING IT TO BE REFILLED AS SOON AS POSSIBLE

## 2023-03-14 ENCOUNTER — ANTICOAGULATION VISIT (OUTPATIENT)
Dept: FAMILY MEDICINE CLINIC | Facility: CLINIC | Age: 73
End: 2023-03-14
Payer: MEDICARE

## 2023-03-14 DIAGNOSIS — Z79.01 LONG TERM (CURRENT) USE OF ANTICOAGULANTS: Primary | ICD-10-CM

## 2023-03-14 LAB — INR PPP: 1.7 (ref 2–3)

## 2023-03-14 PROCEDURE — 85610 PROTHROMBIN TIME: CPT | Performed by: FAMILY MEDICINE

## 2023-03-14 PROCEDURE — 36416 COLLJ CAPILLARY BLOOD SPEC: CPT | Performed by: FAMILY MEDICINE

## 2023-03-28 ENCOUNTER — ANTICOAGULATION VISIT (OUTPATIENT)
Dept: FAMILY MEDICINE CLINIC | Facility: CLINIC | Age: 73
End: 2023-03-28
Payer: MEDICARE

## 2023-03-28 DIAGNOSIS — Z79.01 LONG TERM (CURRENT) USE OF ANTICOAGULANTS: Primary | ICD-10-CM

## 2023-03-28 LAB — INR PPP: 2.2 (ref 2–3)

## 2023-03-28 PROCEDURE — 36416 COLLJ CAPILLARY BLOOD SPEC: CPT | Performed by: FAMILY MEDICINE

## 2023-03-28 PROCEDURE — 85610 PROTHROMBIN TIME: CPT | Performed by: FAMILY MEDICINE

## 2023-04-11 RX ORDER — LISINOPRIL 2.5 MG/1
TABLET ORAL
Qty: 90 TABLET | Refills: 0 | Status: SHIPPED | OUTPATIENT
Start: 2023-04-11

## 2023-04-11 RX ORDER — ATORVASTATIN CALCIUM 40 MG/1
TABLET, FILM COATED ORAL
Qty: 90 TABLET | Refills: 0 | Status: SHIPPED | OUTPATIENT
Start: 2023-04-11

## 2023-04-11 NOTE — TELEPHONE ENCOUNTER
Rx Refill Note  Requested Prescriptions     Pending Prescriptions Disp Refills   • lisinopril (PRINIVIL,ZESTRIL) 2.5 MG tablet [Pharmacy Med Name: LISINOPRIL TAB 2.5MG] 90 tablet 3     Sig: TAKE 1 TABLET DAILY   • atorvastatin (LIPITOR) 40 MG tablet [Pharmacy Med Name: ATORVASTATIN TAB 40MG] 90 tablet 3     Sig: TAKE 1 TABLET EVERY NIGHT      Last office visit with prescribing clinician: 4/18/2022   Last telemedicine visit with prescribing clinician: 4/18/2023   Next office visit with prescribing clinician: 4/18/2023                         Would you like a call back once the refill request has been completed: [] Yes [] No    If the office needs to give you a call back, can they leave a voicemail: [] Yes [] No    Socorro Huff MA  04/11/23, 08:50 EDT

## 2023-04-13 DIAGNOSIS — I10 ESSENTIAL HYPERTENSION: Primary | Chronic | ICD-10-CM

## 2023-04-13 DIAGNOSIS — I50.22 CHRONIC HFREF (HEART FAILURE WITH REDUCED EJECTION FRACTION): ICD-10-CM

## 2023-04-13 DIAGNOSIS — E11.9 TYPE 2 DIABETES MELLITUS WITHOUT COMPLICATION, WITHOUT LONG-TERM CURRENT USE OF INSULIN: Chronic | ICD-10-CM

## 2023-04-17 ENCOUNTER — LAB (OUTPATIENT)
Dept: LAB | Facility: HOSPITAL | Age: 73
End: 2023-04-17
Payer: MEDICARE

## 2023-04-17 LAB
ALBUMIN SERPL-MCNC: 4.3 G/DL (ref 3.5–5.2)
ALBUMIN/GLOB SERPL: 1.3 G/DL
ALP SERPL-CCNC: 130 U/L (ref 39–117)
ALT SERPL W P-5'-P-CCNC: 12 U/L (ref 1–33)
ANION GAP SERPL CALCULATED.3IONS-SCNC: 10.1 MMOL/L (ref 5–15)
AST SERPL-CCNC: 14 U/L (ref 1–32)
BILIRUB SERPL-MCNC: 0.3 MG/DL (ref 0–1.2)
BUN SERPL-MCNC: 47 MG/DL (ref 8–23)
BUN/CREAT SERPL: 25.5 (ref 7–25)
CALCIUM SPEC-SCNC: 10.1 MG/DL (ref 8.6–10.5)
CHLORIDE SERPL-SCNC: 107 MMOL/L (ref 98–107)
CHOLEST SERPL-MCNC: 97 MG/DL (ref 0–200)
CO2 SERPL-SCNC: 22.9 MMOL/L (ref 22–29)
CREAT SERPL-MCNC: 1.84 MG/DL (ref 0.57–1)
EGFRCR SERPLBLD CKD-EPI 2021: 29 ML/MIN/1.73
GLOBULIN UR ELPH-MCNC: 3.2 GM/DL
GLUCOSE SERPL-MCNC: 99 MG/DL (ref 65–99)
HDLC SERPL-MCNC: 42 MG/DL (ref 40–60)
LDLC SERPL CALC-MCNC: 40 MG/DL (ref 0–100)
LDLC/HDLC SERPL: 0.97 {RATIO}
POTASSIUM SERPL-SCNC: 4.6 MMOL/L (ref 3.5–5.2)
PROT SERPL-MCNC: 7.5 G/DL (ref 6–8.5)
SODIUM SERPL-SCNC: 140 MMOL/L (ref 136–145)
TRIGL SERPL-MCNC: 72 MG/DL (ref 0–150)
VLDLC SERPL-MCNC: 15 MG/DL (ref 5–40)

## 2023-04-17 PROCEDURE — 80053 COMPREHEN METABOLIC PANEL: CPT | Performed by: INTERNAL MEDICINE

## 2023-04-17 PROCEDURE — 80061 LIPID PANEL: CPT | Performed by: INTERNAL MEDICINE

## 2023-04-17 PROCEDURE — 36415 COLL VENOUS BLD VENIPUNCTURE: CPT | Performed by: INTERNAL MEDICINE

## 2023-04-18 ENCOUNTER — OFFICE VISIT (OUTPATIENT)
Dept: CARDIOLOGY | Facility: CLINIC | Age: 73
End: 2023-04-18
Payer: MEDICARE

## 2023-04-18 VITALS
HEART RATE: 77 BPM | OXYGEN SATURATION: 99 % | HEIGHT: 63 IN | WEIGHT: 256 LBS | SYSTOLIC BLOOD PRESSURE: 107 MMHG | DIASTOLIC BLOOD PRESSURE: 62 MMHG | BODY MASS INDEX: 45.36 KG/M2

## 2023-04-18 DIAGNOSIS — E66.01 MORBID OBESITY WITH BMI OF 40.0-44.9, ADULT: ICD-10-CM

## 2023-04-18 DIAGNOSIS — I10 ESSENTIAL HYPERTENSION: ICD-10-CM

## 2023-04-18 DIAGNOSIS — Z01.810 PREOPERATIVE CARDIOVASCULAR EXAMINATION: ICD-10-CM

## 2023-04-18 DIAGNOSIS — I25.10 CAD S/P PERCUTANEOUS CORONARY ANGIOPLASTY: ICD-10-CM

## 2023-04-18 DIAGNOSIS — I50.22 CHRONIC HFREF (HEART FAILURE WITH REDUCED EJECTION FRACTION): Primary | ICD-10-CM

## 2023-04-18 DIAGNOSIS — Z98.61 CAD S/P PERCUTANEOUS CORONARY ANGIOPLASTY: ICD-10-CM

## 2023-04-18 NOTE — PROGRESS NOTES
Subjective:     Encounter Date:04/18/2023      Patient ID: Jeanna Parks is a 72 y.o. female.    Chief Complaint and history of present illness:     Follow-up for CAD, CHF, cardiomyopathy, hypertension, diabetes, dyslipidemia    History of Present Illness      Ms. Jeanna Parks has PMH of      CAD, cath 2/22/2021 severe proximal LAD disease  HFrEF, right and left heart cath 2/21/2021 revealing severe pulmonary hypertension, severe TR, MR-  Moderate to severe mitral regurgitation  Severe TR, severe pulmonary hypertension  Cardiomyopathy, combination of ischemic plus or minus valvular heart disease  Hypertension  Dyslipidemia  diabetes   Obesity with BMI over 70  DVT  PE 2/25/2021  Gout, sciatica  No history of any surgeries  Positive family history of premature heart disease in multiple sisters and mother.  Former smoker quit this year      Is here for    follow-up.  Patient denies any chest pain.  Patient has dyspnea on exertion.  Patient is preop for TKR.    Patient's arterial blood pressure is 107/62, heart rate 77, O2 sat of 99% on room air.  BMI is over 40.    Patient had a cardiac cath 2/22/2021 which revealed subtotal occlusion of proximal LAD  Echocardiogram 2/22/2021 reviewed revealing LV dysfunction with EF of 35-40% and diastolic dysfunction with RV enlargement and severe pulmonary hypertension and moderate to severe mitral regurgitation and biatrial enlargement.  Reviewed detailed hospital records.  Patient was in the hospital 2/19/2021-3/2/2021 with shortness of breath was diagnosed with acute PE, severe MR, severe TR, pulmonary hypertension, cor pulmonale, CAD.  Review of records reveal labs from 12/31/2020 cholesterol 137 triglycerides 129, HDL 46, LDL 68.  Hemoglobin A1c 6/30/2019 was 6.4 is improved to 5.4 on 12/30/20.  Normal CMP 12/30/2019 except for elevated glucose at 130.  Labs from 3/15/2022 reveal INR of 2.2.  Labs from 4/17/2023 revealed BUN/creatinine of 47/1.84.  GFR 29.   Lipid profile with cholesterol 97, triglycerides 72, HDL 42, LDL 40.      Assessment ;    Preop for TKR  Renal insufficiency.  Chronic HFrEF due to systolic dysfunction from ischemic cardiomyopathy  CAD, PCI  HFrEF  Ischemic cardiomyopathy EF of 35 to 40%  Valvular heart disease with severe MR and severe TR  Severe pulmonary hypertension  Hypertension  Dyslipidemia  Type 2 diabetes  Morbid obesity with BMI over 50     Recommendations and plan      Reviewed EKG results with patient.  Patient has elevated BUN/creatinine  Will hold Aldactone and Demadex.  We will recheck BUN/creatinine.  We will schedule a stress test patient cannot walk due to dyspnea and deconditioning, will do Lexiscan Cardiolite to evaluate for ischemia also preop cardiac evaluation..  We will discontinue aspirin since patient is on warfarin and Brilinta.  Continue medical management with Brilinta, atorvastatin, metoprolol succinate..  We will continue lisinopril 2.5 mg daily.  To help with CAD, CHF, cardiomyopathy, valvular heart disease, pulmonary hypertension as tolerated.  Monitor renal function and electrolytes  Reviewed diabetes with patient, patient says she does not have diabetes but she takes Metformin.  I reviewed labs with patient from 6/30/2019 with A1c of 6.4.  We will continue atorvastatin for dyslipidemia.  We will continue warfarin for DVT PE, patient was seen by hematology Dr. Falcon and was advised to be on lifelong anticoagulation for unprovoked DVT PE.  Reviewed BMI over 40,, counseled on weight loss diet and exercise.          ECG 12 Lead    Date/Time: 4/18/2023 5:19 PM  Performed by: Itz Raymundo MD  Authorized by: Itz Raymundo MD   Comparison: compared with previous ECG from 8/24/2022  Comparison to previous ECG: EKG done today reviewed/interpreted by me reveals sinus rhythm with rate of 72 bpm, no new change compared EKG from 8/24/2022              Copied text in this portion of the note has been  reviewed and is accurate as of 4/23/2023  The following portions of the patient's history were reviewed and updated as appropriate: allergies, current medications, past family history, past medical history, past social history, past surgical history and problem list.    Assessment:         [facility]     Diagnosis Plan   1. Chronic HFrEF (heart failure with reduced ejection fraction)  Comprehensive Metabolic Panel    BNP    Stress Test With Myocardial Perfusion One Day    ECG 12 Lead      2. Essential hypertension  Comprehensive Metabolic Panel    BNP    Stress Test With Myocardial Perfusion One Day    ECG 12 Lead      3. Morbid obesity with BMI of 40.0-44.9, adult  Comprehensive Metabolic Panel    BNP    Stress Test With Myocardial Perfusion One Day    ECG 12 Lead      4. CAD S/P percutaneous coronary angioplasty  Comprehensive Metabolic Panel    BNP    Stress Test With Myocardial Perfusion One Day    ECG 12 Lead      5. Preoperative cardiovascular examination  Stress Test With Myocardial Perfusion One Day    ECG 12 Lead             Plan:               Past Medical History:  Past Medical History:   Diagnosis Date   • Arthritis    • Congestive heart failure 12/30/2020   • Gout    • Hypertension 9/3/2015   • Sciatica    • Type 2 diabetes mellitus without complication, without long-term current use of insulin 9/3/2019    glucometer, test strips and lancets sent new onset start metformin DM education encouraged weight loss     Past Surgical History:  Past Surgical History:   Procedure Laterality Date   • CARDIAC CATHETERIZATION N/A 2/22/2021    Procedure: Cardiac Catheterization/Vascular Study;  Surgeon: Kit Jacques MD;  [facility];  Service: Cardiovascular;  Laterality: N/A;   • CARDIAC CATHETERIZATION N/A 5/26/2021    Procedure: Left Heart Cath,impella assisted PCI;  Surgeon: Itz Raymundo MD;  Location:  [facility] CATH INVASIVE LOCATION;  Service: Cardiovascular;   Laterality: N/A;   • CARDIAC CATHETERIZATION N/A 2021    Procedure: Stent HADLEY coronary;  Surgeon: Itz Raymundo MD;  Location: [facility] CATH INVASIVE LOCATION;  Service: Cardiovascular;  Laterality: N/A;   • CORONARY ANGIOPLASTY  2021    1x stent to LAD      Allergies:  No Known Allergies  Home Meds:  Current Meds:     Current Outpatient Medications:   •  albuterol sulfate  (90 Base) MCG/ACT inhaler, Inhale 2 puffs Every 4 (Four) Hours As Needed for Wheezing., Disp: 18 g, Rfl: 5  •  allopurinol (ZYLOPRIM) 100 MG tablet, Take 1 tablet by mouth Daily., Disp: 90 tablet, Rfl: 0  •  atorvastatin (LIPITOR) 40 MG tablet, TAKE 1 TABLET EVERY NIGHT, Disp: 90 tablet, Rfl: 0  •  budesonide-formoterol (Symbicort) 80-4.5 MCG/ACT inhaler, Inhale 2 puffs 2 (Two) Times a Day., Disp: 10.2 g, Rfl: 5  •  lisinopril (PRINIVIL,ZESTRIL) 2.5 MG tablet, TAKE 1 TABLET DAILY, Disp: 90 tablet, Rfl: 0  •  metFORMIN (GLUCOPHAGE) 500 MG tablet, Take 1 tablet by mouth 2 (Two) Times a Day With Meals., Disp: 180 tablet, Rfl: 1  •  metoprolol succinate XL (TOPROL-XL) 25 MG 24 hr tablet, Take 1 tablet by mouth Daily., Disp: 90 tablet, Rfl: 3  •  ticagrelor (BRILINTA) 90 MG tablet tablet, Take 1 tablet by mouth 2 (Two) Times a Day., Disp: 180 tablet, Rfl: 3  •  warfarin (COUMADIN) 5 MG tablet, TAKE 1 AND 1/2 TABLET BY MOUTH DAILY ON Monday AND FRIDAY AND TAKE ONE TABLET BY MOUTH DAILY ON TUESDAY, Wednesday,THURSDAY, SATURDAY AND , Disp: 35 tablet, Rfl: 0  Social History:   Social History     Tobacco Use   • Smoking status: Former     Types: Cigarettes     Quit date: 2020     Years since quittin.3   • Smokeless tobacco: Never   • Tobacco comments:     quit a month ago   Substance Use Topics   • Alcohol use: No      Family History:  Family History   Problem Relation Age of Onset   • Heart disease Mother    • Heart failure Mother    • Heart disease Sister    • Heart failure Sister    • Heart disease Sister    •  "Heart failure Sister               Review of Systems   Cardiovascular: Negative for chest pain, leg swelling and palpitations.   Respiratory: Negative for shortness of breath.    Neurological: Negative for dizziness and numbness.     All other systems are negative         Objective:     Physical Exam  /62 (BP Location: Left arm, Patient Position: Sitting, Cuff Size: Large Adult)   Pulse 77   Ht 160 cm (63\")   Wt 116 kg (256 lb)   LMP  (LMP Unknown)   SpO2 99%   BMI 45.35 kg/m²   General:  Appears in no acute distress  Eyes: Sclera is anicteric,  conjunctiva is clear   HEENT:  No JVD.  No carotid bruits  Respiratory: Respirations regular and unlabored at rest.  Clear to auscultation  Cardiovascular: S1,S2 Regular rate and rhythm. No murmur, rub or gallop auscultated.   Extremities: No digital clubbing or cyanosis, no edema  Skin: Color pink. Skin warm and dry to touch. No rashes  No xanthoma  Neuro: Alert and awake.    Lab Reviewed:         Itz Raymundo MD  4/23/2023 17:26 EDT      EMR Dragon/Transcription:   \"Dictated utilizing Dragon dictation\".        "

## 2023-04-28 ENCOUNTER — ANTICOAGULATION VISIT (OUTPATIENT)
Dept: FAMILY MEDICINE CLINIC | Facility: CLINIC | Age: 73
End: 2023-04-28
Payer: MEDICARE

## 2023-04-28 DIAGNOSIS — Z79.01 LONG TERM (CURRENT) USE OF ANTICOAGULANTS: Primary | ICD-10-CM

## 2023-04-28 DIAGNOSIS — I26.09 ACUTE PULMONARY EMBOLISM WITH ACUTE COR PULMONALE, UNSPECIFIED PULMONARY EMBOLISM TYPE: ICD-10-CM

## 2023-04-28 LAB — INR PPP: 2.3 (ref 2–3)

## 2023-04-28 PROCEDURE — 36416 COLLJ CAPILLARY BLOOD SPEC: CPT | Performed by: PHYSICIAN ASSISTANT

## 2023-04-28 PROCEDURE — 85610 PROTHROMBIN TIME: CPT | Performed by: PHYSICIAN ASSISTANT

## 2023-04-30 RX ORDER — ALLOPURINOL 100 MG/1
TABLET ORAL
Qty: 90 TABLET | Refills: 0 | Status: SHIPPED | OUTPATIENT
Start: 2023-04-30

## 2023-05-10 ENCOUNTER — LAB (OUTPATIENT)
Dept: LAB | Facility: HOSPITAL | Age: 73
End: 2023-05-10
Payer: MEDICARE

## 2023-05-10 DIAGNOSIS — I10 ESSENTIAL HYPERTENSION: ICD-10-CM

## 2023-05-10 DIAGNOSIS — Z98.61 CAD S/P PERCUTANEOUS CORONARY ANGIOPLASTY: ICD-10-CM

## 2023-05-10 DIAGNOSIS — E66.01 MORBID OBESITY WITH BMI OF 40.0-44.9, ADULT: ICD-10-CM

## 2023-05-10 DIAGNOSIS — I25.10 CAD S/P PERCUTANEOUS CORONARY ANGIOPLASTY: ICD-10-CM

## 2023-05-10 DIAGNOSIS — I50.22 CHRONIC HFREF (HEART FAILURE WITH REDUCED EJECTION FRACTION): ICD-10-CM

## 2023-05-10 LAB
ALBUMIN SERPL-MCNC: 3.9 G/DL (ref 3.5–5.2)
ALBUMIN/GLOB SERPL: 1.1 G/DL
ALP SERPL-CCNC: 140 U/L (ref 39–117)
ALT SERPL W P-5'-P-CCNC: 22 U/L (ref 1–33)
ANION GAP SERPL CALCULATED.3IONS-SCNC: 10.1 MMOL/L (ref 5–15)
AST SERPL-CCNC: 22 U/L (ref 1–32)
BILIRUB SERPL-MCNC: 0.3 MG/DL (ref 0–1.2)
BUN SERPL-MCNC: 27 MG/DL (ref 8–23)
BUN/CREAT SERPL: 21.6 (ref 7–25)
CALCIUM SPEC-SCNC: 9.8 MG/DL (ref 8.6–10.5)
CHLORIDE SERPL-SCNC: 110 MMOL/L (ref 98–107)
CO2 SERPL-SCNC: 20.9 MMOL/L (ref 22–29)
CREAT SERPL-MCNC: 1.25 MG/DL (ref 0.57–1)
EGFRCR SERPLBLD CKD-EPI 2021: 46.2 ML/MIN/1.73
GLOBULIN UR ELPH-MCNC: 3.4 GM/DL
GLUCOSE SERPL-MCNC: 89 MG/DL (ref 65–99)
NT-PROBNP SERPL-MCNC: 400 PG/ML (ref 0–900)
POTASSIUM SERPL-SCNC: 5.1 MMOL/L (ref 3.5–5.2)
PROT SERPL-MCNC: 7.3 G/DL (ref 6–8.5)
SODIUM SERPL-SCNC: 141 MMOL/L (ref 136–145)

## 2023-05-10 PROCEDURE — 36415 COLL VENOUS BLD VENIPUNCTURE: CPT

## 2023-05-10 PROCEDURE — 80053 COMPREHEN METABOLIC PANEL: CPT

## 2023-05-10 PROCEDURE — 83880 ASSAY OF NATRIURETIC PEPTIDE: CPT

## 2023-05-11 ENCOUNTER — HOSPITAL ENCOUNTER (OUTPATIENT)
Dept: CARDIOLOGY | Facility: HOSPITAL | Age: 73
Discharge: HOME | End: 2023-05-11
Payer: MEDICARE

## 2023-05-11 ENCOUNTER — OFFICE VISIT (OUTPATIENT)
Dept: CARDIOLOGY | Facility: CLINIC | Age: 73
End: 2023-05-11
Payer: MEDICARE

## 2023-05-11 VITALS
DIASTOLIC BLOOD PRESSURE: 76 MMHG | WEIGHT: 256 LBS | SYSTOLIC BLOOD PRESSURE: 144 MMHG | BODY MASS INDEX: 45.36 KG/M2 | HEIGHT: 63 IN | HEART RATE: 78 BPM

## 2023-05-11 DIAGNOSIS — Z98.61 CAD S/P PERCUTANEOUS CORONARY ANGIOPLASTY: ICD-10-CM

## 2023-05-11 DIAGNOSIS — E78.5 DYSLIPIDEMIA: ICD-10-CM

## 2023-05-11 DIAGNOSIS — I10 ESSENTIAL HYPERTENSION: ICD-10-CM

## 2023-05-11 DIAGNOSIS — E66.01 MORBID OBESITY WITH BMI OF 40.0-44.9, ADULT: ICD-10-CM

## 2023-05-11 DIAGNOSIS — Z79.01 LONG TERM (CURRENT) USE OF ANTICOAGULANTS: ICD-10-CM

## 2023-05-11 DIAGNOSIS — Z01.810 PREOPERATIVE CARDIOVASCULAR EXAMINATION: ICD-10-CM

## 2023-05-11 DIAGNOSIS — R94.39 ABNORMAL NUCLEAR STRESS TEST: ICD-10-CM

## 2023-05-11 DIAGNOSIS — I25.10 CAD S/P PERCUTANEOUS CORONARY ANGIOPLASTY: ICD-10-CM

## 2023-05-11 DIAGNOSIS — I34.0 SEVERE MITRAL REGURGITATION: ICD-10-CM

## 2023-05-11 DIAGNOSIS — I25.118 CORONARY ARTERY DISEASE OF NATIVE ARTERY OF NATIVE HEART WITH STABLE ANGINA PECTORIS: ICD-10-CM

## 2023-05-11 DIAGNOSIS — I50.22 CHRONIC HFREF (HEART FAILURE WITH REDUCED EJECTION FRACTION): ICD-10-CM

## 2023-05-11 DIAGNOSIS — R06.09 DYSPNEA ON EXERTION: Primary | ICD-10-CM

## 2023-05-11 DIAGNOSIS — E11.9 TYPE 2 DIABETES MELLITUS WITHOUT COMPLICATION, WITHOUT LONG-TERM CURRENT USE OF INSULIN: ICD-10-CM

## 2023-05-11 LAB
BH CV REST NUCLEAR ISOTOPE DOSE: 10.2 MCI
BH CV STRESS COMMENTS STAGE 1: NORMAL
BH CV STRESS DOSE REGADENOSON STAGE 1: 0.4
BH CV STRESS DURATION MIN STAGE 1: 0
BH CV STRESS DURATION SEC STAGE 1: 10
BH CV STRESS NUCLEAR ISOTOPE DOSE: 32.6 MCI
BH CV STRESS PROTOCOL 1: NORMAL
BH CV STRESS RECOVERY BP: NORMAL MMHG
BH CV STRESS RECOVERY HR: 105 BPM
BH CV STRESS STAGE 1: 1
MAXIMAL PREDICTED HEART RATE: 149 BPM
STRESS BASELINE BP: NORMAL MMHG
STRESS BASELINE HR: 79 BPM
STRESS TARGET HR: 127 BPM

## 2023-05-11 PROCEDURE — 0 TECHNETIUM SESTAMIBI: Performed by: INTERNAL MEDICINE

## 2023-05-11 PROCEDURE — A9500 TC99M SESTAMIBI: HCPCS | Performed by: INTERNAL MEDICINE

## 2023-05-11 PROCEDURE — 25010000002 REGADENOSON 0.4 MG/5ML SOLUTION: Performed by: INTERNAL MEDICINE

## 2023-05-11 PROCEDURE — 78452 HT MUSCLE IMAGE SPECT MULT: CPT

## 2023-05-11 PROCEDURE — 93017 CV STRESS TEST TRACING ONLY: CPT

## 2023-05-11 RX ORDER — REGADENOSON 0.08 MG/ML
0.4 INJECTION, SOLUTION INTRAVENOUS
Status: COMPLETED | OUTPATIENT
Start: 2023-05-11 | End: 2023-05-11

## 2023-05-11 RX ORDER — AMLODIPINE BESYLATE 5 MG/1
5 TABLET ORAL DAILY
Qty: 90 TABLET | Refills: 3 | Status: SHIPPED | OUTPATIENT
Start: 2023-05-11

## 2023-05-11 RX ADMIN — TECHNETIUM TC 99M SESTAMIBI 1 DOSE: 1 INJECTION INTRAVENOUS at 12:23

## 2023-05-11 RX ADMIN — REGADENOSON 0.4 MG: 0.08 INJECTION, SOLUTION INTRAVENOUS at 13:44

## 2023-05-11 RX ADMIN — TECHNETIUM TC 99M SESTAMIBI 1 DOSE: 1 INJECTION INTRAVENOUS at 13:44

## 2023-05-11 NOTE — PROGRESS NOTES
Subjective:     Encounter Date:05/11/2023      Patient ID: Jeanna Parks is a 72 y.o. female.    Chief Complaint and history of present illness:     Follow-up for CAD, CHF, cardiomyopathy, hypertension, diabetes, dyslipidemia    History of Present Illness      Ms. Jeanna Parks has PMH of      CAD, cath 2/22/2021 severe proximal LAD disease  HFrEF, right and left heart cath 2/21/2021 revealing severe pulmonary hypertension, severe TR, MR-  Moderate to severe mitral regurgitation  Severe TR, severe pulmonary hypertension  Cardiomyopathy, combination of ischemic plus or minus valvular heart disease  Hypertension  Dyslipidemia  diabetes   Obesity with BMI over 70  DVT  PE 2/25/2021  Gout, sciatica  No history of any surgeries  Positive family history of premature heart disease in multiple sisters and mother.  Former smoker quit this year      Is here for    follow-up.  Patient denies any chest pain.  Patient has dyspnea on exertion.  Patient is preop for TKR.  Underwent stress test which is high risk abnormal is here for follow-up.    Patient's arterial blood pressure is 144/76, heart rate 78 bpm..  BMI is over 40.    Patient had a cardiac cath 2/22/2021 which revealed subtotal occlusion of proximal LAD  Echocardiogram 2/22/2021 reviewed revealing LV dysfunction with EF of 35-40% and diastolic dysfunction with RV enlargement and severe pulmonary hypertension and moderate to severe mitral regurgitation and biatrial enlargement.  Reviewed detailed hospital records.  Patient was in the hospital 2/19/2021-3/2/2021 with shortness of breath was diagnosed with acute PE, severe MR, severe TR, pulmonary hypertension, cor pulmonale, CAD.  Review of records reveal labs from 12/31/2020 cholesterol 137 triglycerides 129, HDL 46, LDL 68.  Hemoglobin A1c 6/30/2019 was 6.4 is improved to 5.4 on 12/30/20.  Normal CMP 12/30/2019 except for elevated glucose at 130.  Labs from 3/15/2022 reveal INR of 2.2.  Labs from  4/17/2023 revealed BUN/creatinine of 47/1.84.  GFR 29.  Lipid profile with cholesterol 97, triglycerides 72, HDL 42, LDL 40.      Assessment ;    Dyspnea on exertion consistent with angina  Abnormal stress test  Preop for TKR  Renal insufficiency.  Chronic HFrEF due to systolic dysfunction from ischemic cardiomyopathy  CAD, PCI  HFrEF  Ischemic cardiomyopathy EF of 35 to 40%  Valvular heart disease with severe MR and severe TR  Severe pulmonary hypertension  Hypertension  Dyslipidemia  Type 2 diabetes  Morbid obesity with BMI over 50     Recommendations and plan      Reviewed stress test results with patient.  We will schedule for cardiac cath.  Risk benefits alternatives explained.  We will hold warfarin for cardiac cath.  Continue medical management with Brilinta, atorvastatin, metoprolol succinate, amlodipine, lisinopril..  to help with CAD, CHF, cardiomyopathy, valvular heart disease, pulmonary hypertension as tolerated.  Monitor renal function and electrolytes  Reviewed diabetes with patient, patient says she does not have diabetes but she takes Metformin.  I reviewed labs with patient from 6/30/2019 with A1c of 6.4.  We will continue atorvastatin for dyslipidemia.  We will continue warfarin for DVT PE, patient was seen by hematology Dr. Falcon and was advised to be on lifelong anticoagulation for unprovoked DVT PE.  Reviewed BMI over 40,, counseled on weight loss diet and exercise.        Procedures     EKG done 4/18/2023 reveals sinus rhythm at the rate of 72 bpm.    Lexiscan Cardiolite performed 5/11/2023 reveals large anterior and apical MI with eri-infarct ischemia EF of 41%    Copied text in this portion of the note has been reviewed and is accurate as of 5/11/2023  The following portions of the patient's history were reviewed and updated as appropriate: allergies, current medications, past family history, past medical history, past social history, past surgical history and problem list.    Assessment:          [facility]     Diagnosis Plan   1. Dyspnea on exertion  Case Request Cath Lab: Left Heart Cath    CBC (No Diff)    Basic Metabolic Panel    Protime-INR    XR Chest 2 View      2. Coronary artery disease of native artery of native heart with stable angina pectoris  Case Request Cath Lab: Left Heart Cath    CBC (No Diff)    Basic Metabolic Panel    Protime-INR    XR Chest 2 View      3. Preoperative cardiovascular examination  Case Request Cath Lab: Left Heart Cath    CBC (No Diff)    Basic Metabolic Panel    Protime-INR    XR Chest 2 View      4. Abnormal nuclear stress test  Case Request Cath Lab: Left Heart Cath    CBC (No Diff)    Basic Metabolic Panel    Protime-INR    XR Chest 2 View      5. Essential hypertension  Case Request Cath Lab: Left Heart Cath    CBC (No Diff)    Basic Metabolic Panel    Protime-INR    XR Chest 2 View      6. Dyslipidemia  Case Request Cath Lab: Left Heart Cath    CBC (No Diff)    Basic Metabolic Panel    Protime-INR    XR Chest 2 View      7. Morbid obesity with BMI of 40.0-44.9, adult  Case Request Cath Lab: Left Heart Cath    CBC (No Diff)    Basic Metabolic Panel    Protime-INR    XR Chest 2 View      8. Type 2 diabetes mellitus without complication, without long-term current use of insulin  Case Request Cath Lab: Left Heart Cath    CBC (No Diff)    Basic Metabolic Panel    Protime-INR    XR Chest 2 View      9. Severe mitral regurgitation  Case Request Cath Lab: Left Heart Cath    CBC (No Diff)    Basic Metabolic Panel    Protime-INR    XR Chest 2 View      10. Long term (current) use of anticoagulants  Case Request Cath Lab: Left Heart Cath    CBC (No Diff)    Basic Metabolic Panel    Protime-INR    XR Chest 2 View             Plan:               Past Medical History:  Past Medical History:   Diagnosis Date   • Arthritis    • Congestive heart failure 12/30/2020   • Gout    • Hypertension 9/3/2015   • Sciatica    • Type 2 diabetes mellitus without complication, without long-term  current use of insulin 9/3/2019    glucometer, test strips and lancets sent new onset start metformin DM education encouraged weight loss     Past Surgical History:  Past Surgical History:   Procedure Laterality Date   • CARDIAC CATHETERIZATION N/A 2/22/2021    Procedure: Cardiac Catheterization/Vascular Study;  Surgeon: Kit Jacques MD;  Location: [facility] CATH INVASIVE LOCATION;  Service: Cardiovascular;  Laterality: N/A;   • CARDIAC CATHETERIZATION N/A 5/26/2021    Procedure: Left Heart Cath,impella assisted PCI;  Surgeon: Itz Raymundo MD;  Location: [facility] CATH INVASIVE LOCATION;  Service: Cardiovascular;  Laterality: N/A;   • CARDIAC CATHETERIZATION N/A 5/26/2021    Procedure: Stent HADLEY coronary;  Surgeon: Itz Raymundo MD;  Location: [facility] CATH INVASIVE LOCATION;  Service: Cardiovascular;  Laterality: N/A;   • CORONARY ANGIOPLASTY  05/26/2021    1x stent to LAD      Allergies:  No Known Allergies  Home Meds:  Current Meds:     Current Outpatient Medications:   •  albuterol sulfate  (90 Base) MCG/ACT inhaler, Inhale 2 puffs Every 4 (Four) Hours As Needed for Wheezing., Disp: 18 g, Rfl: 5  •  allopurinol (ZYLOPRIM) 100 MG tablet, TAKE 1 TABLET DAILY, Disp: 90 tablet, Rfl: 0  •  amLODIPine (NORVASC) 5 MG tablet, Take 1 tablet by mouth Daily., Disp: 90 tablet, Rfl: 3  •  atorvastatin (LIPITOR) 40 MG tablet, TAKE 1 TABLET EVERY NIGHT, Disp: 90 tablet, Rfl: 0  •  budesonide-formoterol (Symbicort) 80-4.5 MCG/ACT inhaler, Inhale 2 puffs 2 (Two) Times a Day., Disp: 10.2 g, Rfl: 5  •  lisinopril (PRINIVIL,ZESTRIL) 2.5 MG tablet, TAKE 1 TABLET DAILY, Disp: 90 tablet, Rfl: 0  •  metFORMIN (GLUCOPHAGE) 500 MG tablet, Take 1 tablet by mouth 2 (Two) Times a Day With Meals., Disp: 180 tablet, Rfl: 1  •  metoprolol succinate XL (TOPROL-XL) 25 MG 24 hr tablet, Take 1 tablet by mouth Daily., Disp: 90 tablet, Rfl: 3  •  ticagrelor (BRILINTA) 90 MG tablet tablet, Take 1 tablet by mouth  "2 (Two) Times a Day., Disp: 180 tablet, Rfl: 3  •  warfarin (COUMADIN) 5 MG tablet, TAKE 1 AND 1/2 TABLET BY MOUTH DAILY ON Monday AND FRIDAY AND TAKE ONE TABLET BY MOUTH DAILY ON TUESDAY, Wednesday,THURSDAY, SATURDAY AND , Disp: 35 tablet, Rfl: 0  No current facility-administered medications for this visit.  Social History:   Social History     Tobacco Use   • Smoking status: Former     Types: Cigarettes     Quit date: 2020     Years since quittin.4   • Smokeless tobacco: Never   • Tobacco comments:     quit a month ago   Substance Use Topics   • Alcohol use: No      Family History:  Family History   Problem Relation Age of Onset   • Heart disease Mother    • Heart failure Mother    • Heart disease Sister    • Heart failure Sister    • Heart disease Sister    • Heart failure Sister               Review of Systems   Constitutional: Negative for malaise/fatigue.   Cardiovascular: Negative for chest pain, leg swelling and palpitations.   Respiratory: Negative for shortness of breath.    Skin: Negative for rash.   Neurological: Negative for dizziness, light-headedness and numbness.     All other systems are negative         Objective:     Physical Exam  /76   Pulse 78   Ht 160 cm (63\")   Wt 116 kg (256 lb)   LMP  (LMP Unknown)   BMI 45.35 kg/m²   General:  Appears in no acute distress, pleasant obese  Eyes: Sclera is anicteric,  conjunctiva is clear   HEENT:  No JVD.  No carotid bruits  Respiratory: Respirations regular and unlabored at rest.  Clear to auscultation  Cardiovascular: S1,S2 Regular rate and rhythm. No murmur, rub or gallop auscultated.   Extremities: No digital clubbing or cyanosis, no edema  Skin: Color pink. Skin warm and dry to touch. No rashes  No xanthoma  Neuro: Alert and awake.    Lab Reviewed:         Itz Raymundo MD  2023 15:48 EDT      EMR Dragon/Transcription:   \"Dictated utilizing Dragon dictation\".        "

## 2023-05-11 NOTE — H&P (VIEW-ONLY)
Subjective:     Encounter Date:05/11/2023      Patient ID: Jeanna Parks is a 72 y.o. female.    Chief Complaint and history of present illness:     Follow-up for CAD, CHF, cardiomyopathy, hypertension, diabetes, dyslipidemia    History of Present Illness      Ms. Jeanna Parks has PMH of      CAD, cath 2/22/2021 severe proximal LAD disease  HFrEF, right and left heart cath 2/21/2021 revealing severe pulmonary hypertension, severe TR, MR-  Moderate to severe mitral regurgitation  Severe TR, severe pulmonary hypertension  Cardiomyopathy, combination of ischemic plus or minus valvular heart disease  Hypertension  Dyslipidemia  diabetes   Obesity with BMI over 70  DVT  PE 2/25/2021  Gout, sciatica  No history of any surgeries  Positive family history of premature heart disease in multiple sisters and mother.  Former smoker quit this year      Is here for    follow-up.  Patient denies any chest pain.  Patient has dyspnea on exertion.  Patient is preop for TKR.  Underwent stress test which is high risk abnormal is here for follow-up.    Patient's arterial blood pressure is 144/76, heart rate 78 bpm..  BMI is over 40.    Patient had a cardiac cath 2/22/2021 which revealed subtotal occlusion of proximal LAD  Echocardiogram 2/22/2021 reviewed revealing LV dysfunction with EF of 35-40% and diastolic dysfunction with RV enlargement and severe pulmonary hypertension and moderate to severe mitral regurgitation and biatrial enlargement.  Reviewed detailed hospital records.  Patient was in the hospital 2/19/2021-3/2/2021 with shortness of breath was diagnosed with acute PE, severe MR, severe TR, pulmonary hypertension, cor pulmonale, CAD.  Review of records reveal labs from 12/31/2020 cholesterol 137 triglycerides 129, HDL 46, LDL 68.  Hemoglobin A1c 6/30/2019 was 6.4 is improved to 5.4 on 12/30/20.  Normal CMP 12/30/2019 except for elevated glucose at 130.  Labs from 3/15/2022 reveal INR of 2.2.  Labs from  4/17/2023 revealed BUN/creatinine of 47/1.84.  GFR 29.  Lipid profile with cholesterol 97, triglycerides 72, HDL 42, LDL 40.      Assessment ;    Dyspnea on exertion consistent with angina  Abnormal stress test  Preop for TKR  Renal insufficiency.  Chronic HFrEF due to systolic dysfunction from ischemic cardiomyopathy  CAD, PCI  HFrEF  Ischemic cardiomyopathy EF of 35 to 40%  Valvular heart disease with severe MR and severe TR  Severe pulmonary hypertension  Hypertension  Dyslipidemia  Type 2 diabetes  Morbid obesity with BMI over 50     Recommendations and plan      Reviewed stress test results with patient.  We will schedule for cardiac cath.  Risk benefits alternatives explained.  We will hold warfarin for cardiac cath.  Continue medical management with Brilinta, atorvastatin, metoprolol succinate, amlodipine, lisinopril..  to help with CAD, CHF, cardiomyopathy, valvular heart disease, pulmonary hypertension as tolerated.  Monitor renal function and electrolytes  Reviewed diabetes with patient, patient says she does not have diabetes but she takes Metformin.  I reviewed labs with patient from 6/30/2019 with A1c of 6.4.  We will continue atorvastatin for dyslipidemia.  We will continue warfarin for DVT PE, patient was seen by hematology Dr. Falcon and was advised to be on lifelong anticoagulation for unprovoked DVT PE.  Reviewed BMI over 40,, counseled on weight loss diet and exercise.        Procedures     EKG done 4/18/2023 reveals sinus rhythm at the rate of 72 bpm.    Lexiscan Cardiolite performed 5/11/2023 reveals large anterior and apical MI with eri-infarct ischemia EF of 41%    Copied text in this portion of the note has been reviewed and is accurate as of 5/11/2023  The following portions of the patient's history were reviewed and updated as appropriate: allergies, current medications, past family history, past medical history, past social history, past surgical history and problem list.    Assessment:          MDM     Diagnosis Plan   1. Dyspnea on exertion  Case Request Cath Lab: Left Heart Cath    CBC (No Diff)    Basic Metabolic Panel    Protime-INR    XR Chest 2 View      2. Coronary artery disease of native artery of native heart with stable angina pectoris  Case Request Cath Lab: Left Heart Cath    CBC (No Diff)    Basic Metabolic Panel    Protime-INR    XR Chest 2 View      3. Preoperative cardiovascular examination  Case Request Cath Lab: Left Heart Cath    CBC (No Diff)    Basic Metabolic Panel    Protime-INR    XR Chest 2 View      4. Abnormal nuclear stress test  Case Request Cath Lab: Left Heart Cath    CBC (No Diff)    Basic Metabolic Panel    Protime-INR    XR Chest 2 View      5. Essential hypertension  Case Request Cath Lab: Left Heart Cath    CBC (No Diff)    Basic Metabolic Panel    Protime-INR    XR Chest 2 View      6. Dyslipidemia  Case Request Cath Lab: Left Heart Cath    CBC (No Diff)    Basic Metabolic Panel    Protime-INR    XR Chest 2 View      7. Morbid obesity with BMI of 40.0-44.9, adult  Case Request Cath Lab: Left Heart Cath    CBC (No Diff)    Basic Metabolic Panel    Protime-INR    XR Chest 2 View      8. Type 2 diabetes mellitus without complication, without long-term current use of insulin  Case Request Cath Lab: Left Heart Cath    CBC (No Diff)    Basic Metabolic Panel    Protime-INR    XR Chest 2 View      9. Severe mitral regurgitation  Case Request Cath Lab: Left Heart Cath    CBC (No Diff)    Basic Metabolic Panel    Protime-INR    XR Chest 2 View      10. Long term (current) use of anticoagulants  Case Request Cath Lab: Left Heart Cath    CBC (No Diff)    Basic Metabolic Panel    Protime-INR    XR Chest 2 View             Plan:               Past Medical History:  Past Medical History:   Diagnosis Date   • Arthritis    • Congestive heart failure 12/30/2020   • Gout    • Hypertension 9/3/2015   • Sciatica    • Type 2 diabetes mellitus without complication, without long-term  current use of insulin 9/3/2019    glucometer, test strips and lancets sent new onset start metformin DM education encouraged weight loss     Past Surgical History:  Past Surgical History:   Procedure Laterality Date   • CARDIAC CATHETERIZATION N/A 2/22/2021    Procedure: Cardiac Catheterization/Vascular Study;  Surgeon: Kit Jacques MD;  Location: [facility] CATH INVASIVE LOCATION;  Service: Cardiovascular;  Laterality: N/A;   • CARDIAC CATHETERIZATION N/A 5/26/2021    Procedure: Left Heart Cath,impella assisted PCI;  Surgeon: Itz Raymundo MD;  Location: [facility] CATH INVASIVE LOCATION;  Service: Cardiovascular;  Laterality: N/A;   • CARDIAC CATHETERIZATION N/A 5/26/2021    Procedure: Stent HADLEY coronary;  Surgeon: Itz Raymundo MD;  Location: [facility] CATH INVASIVE LOCATION;  Service: Cardiovascular;  Laterality: N/A;   • CORONARY ANGIOPLASTY  05/26/2021    1x stent to LAD      Allergies:  No Known Allergies  Home Meds:  Current Meds:     Current Outpatient Medications:   •  albuterol sulfate  (90 Base) MCG/ACT inhaler, Inhale 2 puffs Every 4 (Four) Hours As Needed for Wheezing., Disp: 18 g, Rfl: 5  •  allopurinol (ZYLOPRIM) 100 MG tablet, TAKE 1 TABLET DAILY, Disp: 90 tablet, Rfl: 0  •  amLODIPine (NORVASC) 5 MG tablet, Take 1 tablet by mouth Daily., Disp: 90 tablet, Rfl: 3  •  atorvastatin (LIPITOR) 40 MG tablet, TAKE 1 TABLET EVERY NIGHT, Disp: 90 tablet, Rfl: 0  •  budesonide-formoterol (Symbicort) 80-4.5 MCG/ACT inhaler, Inhale 2 puffs 2 (Two) Times a Day., Disp: 10.2 g, Rfl: 5  •  lisinopril (PRINIVIL,ZESTRIL) 2.5 MG tablet, TAKE 1 TABLET DAILY, Disp: 90 tablet, Rfl: 0  •  metFORMIN (GLUCOPHAGE) 500 MG tablet, Take 1 tablet by mouth 2 (Two) Times a Day With Meals., Disp: 180 tablet, Rfl: 1  •  metoprolol succinate XL (TOPROL-XL) 25 MG 24 hr tablet, Take 1 tablet by mouth Daily., Disp: 90 tablet, Rfl: 3  •  ticagrelor (BRILINTA) 90 MG tablet tablet, Take 1 tablet by mouth  "2 (Two) Times a Day., Disp: 180 tablet, Rfl: 3  •  warfarin (COUMADIN) 5 MG tablet, TAKE 1 AND 1/2 TABLET BY MOUTH DAILY ON Monday AND FRIDAY AND TAKE ONE TABLET BY MOUTH DAILY ON TUESDAY, Wednesday,THURSDAY, SATURDAY AND , Disp: 35 tablet, Rfl: 0  No current facility-administered medications for this visit.  Social History:   Social History     Tobacco Use   • Smoking status: Former     Types: Cigarettes     Quit date: 2020     Years since quittin.4   • Smokeless tobacco: Never   • Tobacco comments:     quit a month ago   Substance Use Topics   • Alcohol use: No      Family History:  Family History   Problem Relation Age of Onset   • Heart disease Mother    • Heart failure Mother    • Heart disease Sister    • Heart failure Sister    • Heart disease Sister    • Heart failure Sister               Review of Systems   Constitutional: Negative for malaise/fatigue.   Cardiovascular: Negative for chest pain, leg swelling and palpitations.   Respiratory: Negative for shortness of breath.    Skin: Negative for rash.   Neurological: Negative for dizziness, light-headedness and numbness.     All other systems are negative         Objective:     Physical Exam  /76   Pulse 78   Ht 160 cm (63\")   Wt 116 kg (256 lb)   LMP  (LMP Unknown)   BMI 45.35 kg/m²   General:  Appears in no acute distress, pleasant obese  Eyes: Sclera is anicteric,  conjunctiva is clear   HEENT:  No JVD.  No carotid bruits  Respiratory: Respirations regular and unlabored at rest.  Clear to auscultation  Cardiovascular: S1,S2 Regular rate and rhythm. No murmur, rub or gallop auscultated.   Extremities: No digital clubbing or cyanosis, no edema  Skin: Color pink. Skin warm and dry to touch. No rashes  No xanthoma  Neuro: Alert and awake.    Lab Reviewed:         Itz Raymundo MD  2023 15:48 EDT      EMR Dragon/Transcription:   \"Dictated utilizing Dragon dictation\".        "

## 2023-05-23 RX ORDER — METOPROLOL SUCCINATE 25 MG/1
TABLET, EXTENDED RELEASE ORAL
Qty: 90 TABLET | Refills: 3 | Status: SHIPPED | OUTPATIENT
Start: 2023-05-23

## 2023-05-23 NOTE — TELEPHONE ENCOUNTER
Rx Refill Note  Requested Prescriptions     Pending Prescriptions Disp Refills   • metoprolol succinate XL (TOPROL-XL) 25 MG 24 hr tablet [Pharmacy Med Name: METOPROL SUC TAB 25MG ER] 90 tablet 3     Sig: TAKE 1 TABLET DAILY      Last office visit with prescribing clinician: 5/11/2023   Last telemedicine visit with prescribing clinician: Visit date not found   Next office visit with prescribing clinician: 6/29/2023                         Would you like a call back once the refill request has been completed: [] Yes [] No    If the office needs to give you a call back, can they leave a voicemail: [] Yes [] No    Socorro Huff MA  05/23/23, 15:19 EDT

## 2023-05-30 ENCOUNTER — HOSPITAL ENCOUNTER (OUTPATIENT)
Dept: GENERAL RADIOLOGY | Facility: HOSPITAL | Age: 73
Discharge: HOME | End: 2023-05-30

## 2023-05-30 ENCOUNTER — TELEPHONE (OUTPATIENT)
Dept: FAMILY MEDICINE CLINIC | Facility: CLINIC | Age: 73
End: 2023-05-30

## 2023-05-30 ENCOUNTER — LAB (OUTPATIENT)
Dept: LAB | Facility: HOSPITAL | Age: 73
End: 2023-05-30

## 2023-05-30 DIAGNOSIS — E78.5 DYSLIPIDEMIA: ICD-10-CM

## 2023-05-30 DIAGNOSIS — E66.01 MORBID OBESITY WITH BMI OF 40.0-44.9, ADULT: ICD-10-CM

## 2023-05-30 DIAGNOSIS — E11.9 TYPE 2 DIABETES MELLITUS WITHOUT COMPLICATION, WITHOUT LONG-TERM CURRENT USE OF INSULIN: ICD-10-CM

## 2023-05-30 DIAGNOSIS — R94.39 ABNORMAL NUCLEAR STRESS TEST: ICD-10-CM

## 2023-05-30 DIAGNOSIS — I10 ESSENTIAL HYPERTENSION: ICD-10-CM

## 2023-05-30 DIAGNOSIS — Z01.810 PREOPERATIVE CARDIOVASCULAR EXAMINATION: ICD-10-CM

## 2023-05-30 DIAGNOSIS — I25.118 CORONARY ARTERY DISEASE OF NATIVE ARTERY OF NATIVE HEART WITH STABLE ANGINA PECTORIS: ICD-10-CM

## 2023-05-30 DIAGNOSIS — R06.09 DYSPNEA ON EXERTION: ICD-10-CM

## 2023-05-30 DIAGNOSIS — I34.0 SEVERE MITRAL REGURGITATION: ICD-10-CM

## 2023-05-30 DIAGNOSIS — Z79.01 LONG TERM (CURRENT) USE OF ANTICOAGULANTS: ICD-10-CM

## 2023-05-30 LAB
ANION GAP SERPL CALCULATED.3IONS-SCNC: 10.1 MMOL/L (ref 5–15)
BUN SERPL-MCNC: 21 MG/DL (ref 8–23)
BUN/CREAT SERPL: 16.5 (ref 7–25)
CALCIUM SPEC-SCNC: 10 MG/DL (ref 8.6–10.5)
CHLORIDE SERPL-SCNC: 110 MMOL/L (ref 98–107)
CO2 SERPL-SCNC: 22.9 MMOL/L (ref 22–29)
CREAT SERPL-MCNC: 1.27 MG/DL (ref 0.57–1)
DEPRECATED RDW RBC AUTO: 42.2 FL (ref 37–54)
EGFRCR SERPLBLD CKD-EPI 2021: 45.3 ML/MIN/1.73
ERYTHROCYTE [DISTWIDTH] IN BLOOD BY AUTOMATED COUNT: 12.4 % (ref 12.3–15.4)
GLUCOSE SERPL-MCNC: 87 MG/DL (ref 65–99)
HCT VFR BLD AUTO: 32.1 % (ref 34–46.6)
HGB BLD-MCNC: 10.5 G/DL (ref 12–15.9)
INR PPP: 0.98 (ref 2–3)
MCH RBC QN AUTO: 30.7 PG (ref 26.6–33)
MCHC RBC AUTO-ENTMCNC: 32.7 G/DL (ref 31.5–35.7)
MCV RBC AUTO: 93.9 FL (ref 79–97)
PLATELET # BLD AUTO: 191 10*3/MM3 (ref 140–450)
PMV BLD AUTO: 12.2 FL (ref 6–12)
POTASSIUM SERPL-SCNC: 4.8 MMOL/L (ref 3.5–5.2)
PROTHROMBIN TIME: 10.5 SECONDS (ref 19.4–28.5)
RBC # BLD AUTO: 3.42 10*6/MM3 (ref 3.77–5.28)
SODIUM SERPL-SCNC: 143 MMOL/L (ref 136–145)
WBC NRBC COR # BLD: 7.12 10*3/MM3 (ref 3.4–10.8)

## 2023-05-30 PROCEDURE — 36415 COLL VENOUS BLD VENIPUNCTURE: CPT

## 2023-05-30 PROCEDURE — 71046 X-RAY EXAM CHEST 2 VIEWS: CPT

## 2023-05-30 PROCEDURE — 85610 PROTHROMBIN TIME: CPT

## 2023-05-30 PROCEDURE — 85027 COMPLETE CBC AUTOMATED: CPT

## 2023-05-30 PROCEDURE — 80048 BASIC METABOLIC PNL TOTAL CA: CPT

## 2023-05-30 NOTE — TELEPHONE ENCOUNTER
Pt has a stent placement tomorrow at the hospital. She is wanting to know if she still needs to come in before that for her INR check even though she has been holding for 1 week.

## 2023-05-31 ENCOUNTER — HOSPITAL ENCOUNTER (OUTPATIENT)
Facility: HOSPITAL | Age: 73
Setting detail: HOSPITAL OUTPATIENT SURGERY
Discharge: HOME | End: 2023-05-31
Attending: INTERNAL MEDICINE | Admitting: INTERNAL MEDICINE
Payer: MEDICARE

## 2023-05-31 VITALS
HEART RATE: 79 BPM | OXYGEN SATURATION: 98 % | DIASTOLIC BLOOD PRESSURE: 82 MMHG | HEIGHT: 62 IN | BODY MASS INDEX: 48.52 KG/M2 | WEIGHT: 263.67 LBS | SYSTOLIC BLOOD PRESSURE: 143 MMHG | TEMPERATURE: 98.5 F | RESPIRATION RATE: 17 BRPM

## 2023-05-31 DIAGNOSIS — Z01.810 PREOPERATIVE CARDIOVASCULAR EXAMINATION: ICD-10-CM

## 2023-05-31 DIAGNOSIS — I25.118 CORONARY ARTERY DISEASE OF NATIVE ARTERY OF NATIVE HEART WITH STABLE ANGINA PECTORIS: ICD-10-CM

## 2023-05-31 DIAGNOSIS — I34.0 SEVERE MITRAL REGURGITATION: ICD-10-CM

## 2023-05-31 DIAGNOSIS — I10 ESSENTIAL HYPERTENSION: ICD-10-CM

## 2023-05-31 DIAGNOSIS — E78.5 DYSLIPIDEMIA: ICD-10-CM

## 2023-05-31 DIAGNOSIS — E66.01 MORBID OBESITY WITH BMI OF 40.0-44.9, ADULT: ICD-10-CM

## 2023-05-31 DIAGNOSIS — R06.09 DYSPNEA ON EXERTION: ICD-10-CM

## 2023-05-31 DIAGNOSIS — E11.9 TYPE 2 DIABETES MELLITUS WITHOUT COMPLICATION, WITHOUT LONG-TERM CURRENT USE OF INSULIN: ICD-10-CM

## 2023-05-31 DIAGNOSIS — R94.39 ABNORMAL NUCLEAR STRESS TEST: ICD-10-CM

## 2023-05-31 DIAGNOSIS — Z79.01 LONG TERM (CURRENT) USE OF ANTICOAGULANTS: ICD-10-CM

## 2023-05-31 PROCEDURE — 99152 MOD SED SAME PHYS/QHP 5/>YRS: CPT | Performed by: INTERNAL MEDICINE

## 2023-05-31 PROCEDURE — 0 LIDOCAINE 1 % SOLUTION: Performed by: INTERNAL MEDICINE

## 2023-05-31 PROCEDURE — 93458 L HRT ARTERY/VENTRICLE ANGIO: CPT | Performed by: INTERNAL MEDICINE

## 2023-05-31 PROCEDURE — C1769 GUIDE WIRE: HCPCS | Performed by: INTERNAL MEDICINE

## 2023-05-31 PROCEDURE — C1894 INTRO/SHEATH, NON-LASER: HCPCS | Performed by: INTERNAL MEDICINE

## 2023-05-31 PROCEDURE — C1760 CLOSURE DEV, VASC: HCPCS | Performed by: INTERNAL MEDICINE

## 2023-05-31 PROCEDURE — 25010000002 FENTANYL CITRATE (PF) 100 MCG/2ML SOLUTION: Performed by: INTERNAL MEDICINE

## 2023-05-31 PROCEDURE — 25010000002 MIDAZOLAM PER 1 MG: Performed by: INTERNAL MEDICINE

## 2023-05-31 PROCEDURE — 25510000001 IOPAMIDOL PER 1 ML: Performed by: INTERNAL MEDICINE

## 2023-05-31 RX ORDER — ACETAMINOPHEN 325 MG/1
650 TABLET ORAL EVERY 4 HOURS PRN
Status: DISCONTINUED | OUTPATIENT
Start: 2023-05-31 | End: 2023-05-31 | Stop reason: HOSPADM

## 2023-05-31 RX ORDER — FENTANYL CITRATE 50 UG/ML
INJECTION, SOLUTION INTRAMUSCULAR; INTRAVENOUS
Status: DISCONTINUED | OUTPATIENT
Start: 2023-05-31 | End: 2023-05-31 | Stop reason: HOSPADM

## 2023-05-31 RX ORDER — MIDAZOLAM HYDROCHLORIDE 1 MG/ML
INJECTION INTRAMUSCULAR; INTRAVENOUS
Status: DISCONTINUED | OUTPATIENT
Start: 2023-05-31 | End: 2023-05-31 | Stop reason: HOSPADM

## 2023-05-31 RX ORDER — SODIUM CHLORIDE 9 MG/ML
100 INJECTION, SOLUTION INTRAVENOUS CONTINUOUS
Status: DISCONTINUED | OUTPATIENT
Start: 2023-05-31 | End: 2023-05-31 | Stop reason: HOSPADM

## 2023-05-31 RX ORDER — LIDOCAINE HYDROCHLORIDE 10 MG/ML
INJECTION, SOLUTION INFILTRATION; PERINEURAL
Status: DISCONTINUED | OUTPATIENT
Start: 2023-05-31 | End: 2023-05-31 | Stop reason: HOSPADM

## 2023-05-31 RX ORDER — NITROGLYCERIN 0.4 MG/1
0.4 TABLET SUBLINGUAL
Status: DISCONTINUED | OUTPATIENT
Start: 2023-05-31 | End: 2023-05-31 | Stop reason: HOSPADM

## 2023-05-31 NOTE — DISCHARGE INSTRUCTIONS
Post Cath Instructions    Call Dr. Raymundo’s office to schedule a follow up appointment in 2-4 weeks at [phone].  Specific Physician Instructions:     Drink plenty of fluids for the next 24 hours.  This helps to eliminate the dye used in your procedure through urination.  You may resume a normal diet; however, try to avoid foods that would cause gas or constipation.    Sedative medication given to you during your catheterization may decrease your judgement and reaction time for up to 24-48 hours.  Therefore:  DO NOT drive or operate hazardous machinery (48 hours)  DO NOT consume alcoholic beverages  DO NOT make any important/legal decisions  Have someone stay with you for at least 24 hours    To allow proper healing and prevent bleeding, the following activities are to be strictly avoided for the next 24-48 hours:  Excessive bending at wound site  Straining (anything that would tense up muscles around the affected puncture site)  Lifting objects greater than 5 pounds, pushing, or pulling for 5 days    For Groin Cases:  Refrain from sexual activity  Refrain from running or vigorous walking  No prolonged sitting or standing  Limit stair climbing as much as possible    Keep the puncture site clean and dry.  You may remove the dressing tomorrow and replace it with a band-aid for at least one additional day.  Gently clean the site with mild soap and water.  No scrubbing/rubbing and lightly pat the area dry.  Showers are acceptable; however, avoid submerging in water (tub baths, hot tubs, swimming pools, dishwater, etc…) for at least one week.  The site should be completely healed before resuming these activities to reduce the risk of infection.  Check the site often.  Watch for signs and symptoms of infection and notify your physician if any of the following occur:  Bleeding or an increase in swelling at the puncture site  Fever  Increased soreness around puncture site  Foul odor or significant drainage from the  puncture site  Swelling, redness, or warmth at the puncture site    **A bruise or small “pea sized” lump under the skin at the puncture site is not unusual.  This should disappear within 3-4 weeks.**  CONTACT YOUR PHYSICIAN OR CALL 911 IF YOU EXPERIENCE ANY OF THE FOLLOWING:  Increased angina (chest pain) or frequent sensations of pressure, burning, pain, or other discomfort in the chest, arm, jaws, or stomach  Lightheadedness, dizziness, faint feeling, sweating, or difficulty breathing  Odd sensation changes like numbness, tingling, coldness, or pain in the arm or leg in which the catheter was inserted  Limb in which the catheter was inserted becomes pale/bluish in color    IMPORTANT:  Although this occurs very rarely, if you should develop bright red or excessive bleeding, feel a “pop” inside at the insertion site, or notice a sudden increase in swelling larger than a walnut, you should call 911.  Hold continuous firm pressure to the access site until emergency personnel arrive.  It is best if someone else can do this for you.

## 2023-06-07 ENCOUNTER — ANTICOAGULATION VISIT (OUTPATIENT)
Dept: FAMILY MEDICINE CLINIC | Facility: CLINIC | Age: 73
End: 2023-06-07
Payer: MEDICARE

## 2023-06-07 DIAGNOSIS — Z79.01 LONG TERM (CURRENT) USE OF ANTICOAGULANTS: Primary | ICD-10-CM

## 2023-06-07 LAB — INR PPP: 1.5 (ref 2–3)

## 2023-06-07 PROCEDURE — 85610 PROTHROMBIN TIME: CPT | Performed by: NURSE PRACTITIONER

## 2023-06-07 PROCEDURE — 36416 COLLJ CAPILLARY BLOOD SPEC: CPT | Performed by: NURSE PRACTITIONER

## 2023-06-14 ENCOUNTER — ANTICOAGULATION VISIT (OUTPATIENT)
Dept: FAMILY MEDICINE CLINIC | Facility: CLINIC | Age: 73
End: 2023-06-14
Payer: MEDICARE

## 2023-06-14 DIAGNOSIS — Z79.01 LONG TERM (CURRENT) USE OF ANTICOAGULANTS: Primary | ICD-10-CM

## 2023-06-14 LAB — INR PPP: 2.2 (ref 2–3)

## 2023-07-30 RX ORDER — ALLOPURINOL 100 MG/1
TABLET ORAL
Qty: 90 TABLET | Refills: 0 | Status: SHIPPED | OUTPATIENT
Start: 2023-07-30

## 2023-08-07 ENCOUNTER — TELEPHONE (OUTPATIENT)
Dept: CARDIOLOGY | Facility: CLINIC | Age: 73
End: 2023-08-07
Payer: MEDICARE

## 2023-08-07 RX ORDER — ATORVASTATIN CALCIUM 40 MG/1
TABLET, FILM COATED ORAL
Qty: 90 TABLET | Refills: 3 | Status: SHIPPED | OUTPATIENT
Start: 2023-08-07

## 2023-08-07 NOTE — TELEPHONE ENCOUNTER
Rx Refill Note  Requested Prescriptions      No prescriptions requested or ordered in this encounter      Last office visit with prescribing clinician: 6/29/2023   Last telemedicine visit with prescribing clinician: Visit date not found   Next office visit with prescribing clinician: 12/27/2023                         Would you like a call back once the refill request has been completed: [] Yes [] No    If the office needs to give you a call back, can they leave a voicemail: [] Yes [] No    Socorro Huff MA  08/07/23, 12:55 EDT

## 2023-08-07 NOTE — TELEPHONE ENCOUNTER
Rx Refill Note  Requested Prescriptions     Pending Prescriptions Disp Refills    atorvastatin (LIPITOR) 40 MG tablet [Pharmacy Med Name: ATORVASTATIN TAB 40MG] 90 tablet 3     Sig: TAKE 1 TABLET EVERY NIGHT      Last office visit with prescribing clinician: 6/29/2023   Last telemedicine visit with prescribing clinician: Visit date not found   Next office visit with prescribing clinician: 12/27/2023                         Would you like a call back once the refill request has been completed: [] Yes [] No    If the office needs to give you a call back, can they leave a voicemail: [] Yes [] No    Maria Luz Collzao MA  08/07/23, 10:04 EDT

## 2023-08-07 NOTE — TELEPHONE ENCOUNTER
Incoming Refill Request      Medication requested (name and dose): [facility]    Pharmacy where request should be sent: [facility]    Additional details provided by patient:     Best call back number: [phone]    Does the patient have less than a 3 day supply:  [x] Yes  [] No    Tiffanie Valerio Rep  08/07/23, 12:12 EDT

## 2023-08-09 NOTE — TELEPHONE ENCOUNTER
Rx Refill Note  Requested Prescriptions      No prescriptions requested or ordered in this encounter      Last office visit with prescribing clinician: 6/29/2023   Last telemedicine visit with prescribing clinician: Visit date not found   Next office visit with prescribing clinician: 12/27/2023                         Would you like a call back once the refill request has been completed: [] Yes [] No    If the office needs to give you a call back, can they leave a voicemail: [] Yes [] No    Socorro Huff MA  08/09/23, 12:53 EDT

## 2023-08-11 ENCOUNTER — ANTICOAGULATION VISIT (OUTPATIENT)
Dept: FAMILY MEDICINE CLINIC | Facility: CLINIC | Age: 73
End: 2023-08-11
Payer: MEDICARE

## 2023-08-11 DIAGNOSIS — Z79.01 LONG TERM (CURRENT) USE OF ANTICOAGULANTS: Primary | ICD-10-CM

## 2023-08-11 LAB — INR PPP: 2.7 (ref 0.9–1.1)

## 2023-08-11 PROCEDURE — 36416 COLLJ CAPILLARY BLOOD SPEC: CPT | Performed by: FAMILY MEDICINE

## 2023-08-11 PROCEDURE — 85610 PROTHROMBIN TIME: CPT | Performed by: FAMILY MEDICINE

## 2023-08-31 ENCOUNTER — OFFICE VISIT (OUTPATIENT)
Dept: FAMILY MEDICINE CLINIC | Facility: CLINIC | Age: 73
End: 2023-08-31
Payer: MEDICARE

## 2023-08-31 VITALS
HEIGHT: 62 IN | WEIGHT: 268 LBS | SYSTOLIC BLOOD PRESSURE: 109 MMHG | HEART RATE: 89 BPM | DIASTOLIC BLOOD PRESSURE: 73 MMHG | OXYGEN SATURATION: 99 % | TEMPERATURE: 97.8 F | BODY MASS INDEX: 49.32 KG/M2

## 2023-08-31 DIAGNOSIS — D50.9 IRON DEFICIENCY ANEMIA, UNSPECIFIED IRON DEFICIENCY ANEMIA TYPE: ICD-10-CM

## 2023-08-31 DIAGNOSIS — Z12.11 SCREENING FOR COLON CANCER: ICD-10-CM

## 2023-08-31 DIAGNOSIS — Z12.31 BREAST CANCER SCREENING BY MAMMOGRAM: ICD-10-CM

## 2023-08-31 DIAGNOSIS — E11.9 TYPE 2 DIABETES MELLITUS WITHOUT COMPLICATION, WITHOUT LONG-TERM CURRENT USE OF INSULIN: Primary | Chronic | ICD-10-CM

## 2023-08-31 NOTE — PROGRESS NOTES
Subjective   Jeanna Parks is a 73 y.o. female.     History of Present Illness  Here for follow up on dm and anemia  Cardiology follows her bp/chol     Jeanna Parks is a 73-year-old female who presents today for follow-up of diabetes mellitus.    The patient states that she has never been diagnosed as diabetic and her A1c's have always been normal.     The following portions of the patient's history were reviewed and updated as appropriate: allergies, current medications, past family history, past medical history, past social history, past surgical history, and problem list.  Past Medical History:   Diagnosis Date    Arthritis     Congestive heart failure 12/30/2020    Gout     Hypertension 9/3/2015    Sciatica     Type 2 diabetes mellitus without complication, without long-term current use of insulin 9/3/2019    glucometer, test strips and lancets sent new onset start metformin DM education encouraged weight loss     Past Surgical History:   Procedure Laterality Date    CARDIAC CATHETERIZATION N/A 2/22/2021    Procedure: Cardiac Catheterization/Vascular Study;  Surgeon: Kit Jacques MD;  Location: [facility] CATH INVASIVE LOCATION;  Service: Cardiovascular;  Laterality: N/A;    CARDIAC CATHETERIZATION N/A 5/26/2021    Procedure: Left Heart Cath,impella assisted PCI;  Surgeon: Itz Raymundo MD;  Location: [facility] CATH INVASIVE LOCATION;  Service: Cardiovascular;  Laterality: N/A;    CARDIAC CATHETERIZATION N/A 5/26/2021    Procedure: Stent HADLEY coronary;  Surgeon: Itz Raymundo MD;  Location: [facility] CATH INVASIVE LOCATION;  Service: Cardiovascular;  Laterality: N/A;    CARDIAC CATHETERIZATION N/A 5/31/2023    Procedure: Left Heart Cath;  Surgeon: Itz Raymundo MD;  Location: [facility] CATH INVASIVE LOCATION;  Service: Cardiovascular;  Laterality: N/A;    CORONARY ANGIOPLASTY  05/26/2021    1x stent to LAD     Family History   Problem Relation Age of Onset     "Heart disease Mother     Heart failure Mother     Heart disease Sister     Heart failure Sister     Heart disease Sister     Heart failure Sister      Social History     Socioeconomic History    Marital status:    Tobacco Use    Smoking status: Former     Types: Cigarettes     Quit date: 2020     Years since quittin.7     Passive exposure: Past    Smokeless tobacco: Never    Tobacco comments:     quit a month ago   Vaping Use    Vaping Use: Never used   Substance and Sexual Activity    Alcohol use: No    Drug use: No    Sexual activity: Defer         Current Outpatient Medications:     albuterol sulfate  (90 Base) MCG/ACT inhaler, Inhale 2 puffs Every 4 (Four) Hours As Needed for Wheezing., Disp: 18 g, Rfl: 5    allopurinol (ZYLOPRIM) 100 MG tablet, TAKE 1 TABLET DAILY, Disp: 90 tablet, Rfl: 0    amLODIPine (NORVASC) 5 MG tablet, Take 1 tablet by mouth Daily., Disp: 90 tablet, Rfl: 3    atorvastatin (LIPITOR) 40 MG tablet, TAKE 1 TABLET EVERY NIGHT, Disp: 90 tablet, Rfl: 3    budesonide-formoterol (Symbicort) 80-4.5 MCG/ACT inhaler, Inhale 2 puffs 2 (Two) Times a Day., Disp: 10.2 g, Rfl: 5    lisinopril (PRINIVIL,ZESTRIL) 2.5 MG tablet, TAKE 1 TABLET DAILY, Disp: 90 tablet, Rfl: 0    metoprolol succinate XL (TOPROL-XL) 25 MG 24 hr tablet, TAKE 1 TABLET DAILY, Disp: 90 tablet, Rfl: 3    ticagrelor (BRILINTA) 90 MG tablet tablet, Take 1 tablet by mouth 2 (Two) Times a Day., Disp: 32 tablet, Rfl: 0    warfarin (COUMADIN) 5 MG tablet, TAKE 1 AND 1/2 TABLET BY MOUTH DAILY ON  AND FRIDAY AND TAKE ONE TABLET BY MOUTH DAILY ON TUESDAY,THURSDAY, SATURDAY AND , Disp: 115 tablet, Rfl: 0    Review of Systems  /73 (BP Location: Left arm, Patient Position: Sitting, Cuff Size: Large Adult)   Pulse 89   Temp 97.8 øF (36.6 øC) (Temporal)   Ht 157.5 cm (62\")   Wt 122 kg (268 lb)   LMP  (LMP Unknown)   SpO2 99%   BMI 49.02 kg/mý     A review of systems was performed, and the " pertinent positives are noted in the HPI.     Objective   Physical Exam  Vitals and nursing note reviewed.   Constitutional:       Appearance: Normal appearance. She is well-developed and well-groomed. She is morbidly obese.   Cardiovascular:      Rate and Rhythm: Normal rate and regular rhythm.      Heart sounds: Normal heart sounds.   Pulmonary:      Effort: Pulmonary effort is normal.      Breath sounds: Normal breath sounds.   Musculoskeletal:      Right lower leg: No edema.      Left lower leg: No edema.   Neurological:      Mental Status: She is alert.   Psychiatric:         Behavior: Behavior is cooperative.       Lab Results   Component Value Date    GLUCOSE 87 05/30/2023    CALCIUM 10.0 05/30/2023     05/30/2023    K 4.8 05/30/2023    CO2 22.9 05/30/2023     (H) 05/30/2023    BUN 21 05/30/2023    CREATININE 1.27 (H) 05/30/2023    EGFR 45.3 (L) 05/30/2023    BCR 16.5 05/30/2023    ANIONGAP 10.1 05/30/2023     Lab Results   Component Value Date    WBC 7.12 05/30/2023    HGB 10.5 (L) 05/30/2023    HCT 32.1 (L) 05/30/2023    MCV 93.9 05/30/2023     05/30/2023   Patient has been erroneously marked as diabetic. Based on the available clinical information, she does not have diabetes and should therefore be excluded from diabetic health maintenance and quality measures for the remainder of the reporting period.     Assessment & Plan   Problems Addressed this Visit          Endocrine and Metabolic    RESOLVED: Type 2 diabetes mellitus without complication, without long-term current use of insulin - Primary (Chronic)       Gastrointestinal Abdominal     Screening for colon cancer    Relevant Orders    Cologuard - Stool, Per Rectum       Genitourinary and Reproductive     Breast cancer screening by mammogram    Relevant Orders    Mammo Screening Digital Tomosynthesis Bilateral With CAD       Hematology and Neoplasia    Iron deficiency anemia     Diagnoses         Codes Comments    Type 2 diabetes  mellitus without complication, without long-term current use of insulin    -  Primary ICD-10-CM: E11.9  ICD-9-CM: 250.00     Iron deficiency anemia, unspecified iron deficiency anemia type     ICD-10-CM: D50.9  ICD-9-CM: 280.9     Breast cancer screening by mammogram     ICD-10-CM: Z12.31  ICD-9-CM: V76.12     Screening for colon cancer     ICD-10-CM: Z12.11  ICD-9-CM: V76.51           1. Diabetes mellitus  - Resolved. Patient says she has never been diagnosed as diabetic and her A1c's have always been normal, which is what her chart reflects. She can stop the metformin.    2. Iron deficiency anemia  - She will continue to follow up with the hematologist oncologist.    3. Breast cancer screening  - She is due a mammogram and that was ordered.    4. Screening for colon cancer  - She is also due to be screened for colon cancer and her Cologuard was ordered.    She will keep follow-up with cardiology for blood pressure and cholesterol and I will see her back in 6 months for follow-up and Medicare wellness.      Transcribed from ambient dictation for Latanya Hawk MD by Stone Curtis.  08/31/23   13:31 EDT    Patient or patient representative verbalized consent to the visit recording.  I have personally performed the services described in this document as transcribed by the above individual, and it is both accurate and complete.

## 2023-09-11 RX ORDER — WARFARIN SODIUM 5 MG/1
TABLET ORAL
Qty: 115 TABLET | Refills: 0 | Status: SHIPPED | OUTPATIENT
Start: 2023-09-11 | End: 2023-09-12 | Stop reason: SDUPTHER

## 2023-09-11 NOTE — TELEPHONE ENCOUNTER
Caller: Jeanna Brown    Relationship: Self    Requested Prescriptions:   Requested Prescriptions     Pending Prescriptions Disp Refills    warfarin (COUMADIN) 5 MG tablet 115 tablet 0     Sig: TAKE 1 AND 1/2 TABLET BY MOUTH DAILY ON Monday Wednesday AND FRIDAY AND TAKE ONE TABLET BY MOUTH DAILY ON TUESDAY,THURSDAY, SATURDAY AND SUNDAY        Pharmacy where request should be sent: [facility] [id number] [facility] [address] [facility] - [phone]  - [phone] FX     Last office visit with prescribing clinician: 8/31/2023   Last telemedicine visit with prescribing clinician: Visit date not found   Next office visit with prescribing clinician: 4/2/2024     Additional details provided by patient: PATIENT WILL BE OUT THURSDAY, AND NEEDS AN EMERGENCY SUPPLY FOR 14 DAYS SENT TO [facility] UNTIL HER MAIL DELIVERY ARRIVES WITH THE MEDICATION.       Does the patient have less than a 3 day supply:  [x] Yes  [] No    Would you like a call back once the refill request has been completed: [] Yes [x] No    If the office needs to give you a call back, can they leave a voicemail: [] Yes [x] No    Tiffanie Umana Rep   09/11/23 09:15 EDT

## 2023-09-12 ENCOUNTER — TELEPHONE (OUTPATIENT)
Dept: FAMILY MEDICINE CLINIC | Facility: CLINIC | Age: 73
End: 2023-09-12

## 2023-09-12 ENCOUNTER — ANTICOAGULATION VISIT (OUTPATIENT)
Dept: FAMILY MEDICINE CLINIC | Facility: CLINIC | Age: 73
End: 2023-09-12
Payer: MEDICARE

## 2023-09-12 DIAGNOSIS — Z79.01 LONG TERM (CURRENT) USE OF ANTICOAGULANTS: Primary | ICD-10-CM

## 2023-09-12 LAB — INR PPP: 2 (ref 2–3)

## 2023-09-12 PROCEDURE — 85610 PROTHROMBIN TIME: CPT | Performed by: FAMILY MEDICINE

## 2023-09-12 PROCEDURE — 36416 COLLJ CAPILLARY BLOOD SPEC: CPT | Performed by: FAMILY MEDICINE

## 2023-09-12 RX ORDER — WARFARIN SODIUM 5 MG/1
TABLET ORAL
Qty: 115 TABLET | Refills: 0 | Status: SHIPPED | OUTPATIENT
Start: 2023-09-12

## 2023-09-12 NOTE — TELEPHONE ENCOUNTER
Patient needs a refill for Warfarin 5 mg 2 weeks sent to [facility] [address] but it looks that was sent yesterday  and patients mail order is [facility] and needs it refilled through them as well. She just wasn't going to get it in time with mail order.

## 2023-09-13 DIAGNOSIS — R19.5 POSITIVE COLORECTAL CANCER SCREENING USING COLOGUARD TEST: Primary | ICD-10-CM

## 2023-09-29 ENCOUNTER — TELEPHONE (OUTPATIENT)
Dept: FAMILY MEDICINE CLINIC | Facility: CLINIC | Age: 73
End: 2023-09-29
Payer: MEDICARE

## 2023-09-29 RX ORDER — MOMETASONE FUROATE 1 MG/G
1 CREAM TOPICAL DAILY
Qty: 15 G | Refills: 0 | Status: SHIPPED | OUTPATIENT
Start: 2023-09-29

## 2023-09-29 NOTE — TELEPHONE ENCOUNTER
Caller: Jeanna Brown    Relationship: Self    Best call back number: [phone]    What medication are you requesting: CREAM FOR ECZEMA     What are your current symptoms: ECZEMA     Have you had these symptoms before:    [x] Yes  [] No    Have you been treated for these symptoms before:   [x] Yes  [] No    If a prescription is needed, what is your preferred pharmacy and phone number: [facility] [id number] [place], [place] - [address] [facility] [phone]  - [phone] FX     Additional notes:

## 2023-10-12 ENCOUNTER — TELEPHONE (OUTPATIENT)
Dept: FAMILY MEDICINE CLINIC | Facility: CLINIC | Age: 73
End: 2023-10-12
Payer: MEDICARE

## 2023-10-12 ENCOUNTER — ANTICOAGULATION VISIT (OUTPATIENT)
Dept: FAMILY MEDICINE CLINIC | Facility: CLINIC | Age: 73
End: 2023-10-12
Payer: MEDICARE

## 2023-10-12 DIAGNOSIS — Z79.01 LONG TERM (CURRENT) USE OF ANTICOAGULANTS: Primary | ICD-10-CM

## 2023-10-12 LAB — INR PPP: 1.9 (ref 0.9–1.1)

## 2023-10-12 PROCEDURE — 85610 PROTHROMBIN TIME: CPT | Performed by: FAMILY MEDICINE

## 2023-10-12 PROCEDURE — 36416 COLLJ CAPILLARY BLOOD SPEC: CPT | Performed by: FAMILY MEDICINE

## 2023-10-12 NOTE — TELEPHONE ENCOUNTER
[facility] called. [facility] did not accept pts insurance. They have the script, but need OV notes and demographics. Are you able to update last ov note to include need for rollator?

## 2023-10-12 NOTE — TELEPHONE ENCOUNTER
Caller: Jeanna Brown    Relationship to patient: Self    Best call back number: [phone]    Patient is needing:     WOULD LIKE TO KNOW IF DOCTOR SHARYN COULD ORDER HER A ROLLATOR.     STATES SHE HAS BEEN HAVING SOME TROUBLE WALKING.     IF SO, WOULD LIKE IT SENT TO:    [facility] - [place], [place] - [address] [place] [address] [phone] [place] [phone]

## 2023-10-18 ENCOUNTER — OFFICE VISIT (OUTPATIENT)
Dept: FAMILY MEDICINE CLINIC | Facility: CLINIC | Age: 73
End: 2023-10-18
Payer: MEDICARE

## 2023-10-18 VITALS
HEIGHT: 62 IN | WEIGHT: 264 LBS | DIASTOLIC BLOOD PRESSURE: 80 MMHG | BODY MASS INDEX: 48.58 KG/M2 | HEART RATE: 96 BPM | TEMPERATURE: 97.6 F | OXYGEN SATURATION: 98 % | SYSTOLIC BLOOD PRESSURE: 127 MMHG

## 2023-10-18 DIAGNOSIS — R29.898 WEAKNESS OF BOTH LOWER EXTREMITIES: ICD-10-CM

## 2023-10-18 DIAGNOSIS — M17.12 LOCALIZED OSTEOARTHRITIS OF LEFT KNEE: Primary | ICD-10-CM

## 2023-10-18 PROCEDURE — 99213 OFFICE O/P EST LOW 20 MIN: CPT | Performed by: FAMILY MEDICINE

## 2023-10-18 PROCEDURE — 3079F DIAST BP 80-89 MM HG: CPT | Performed by: FAMILY MEDICINE

## 2023-10-18 PROCEDURE — 3074F SYST BP LT 130 MM HG: CPT | Performed by: FAMILY MEDICINE

## 2023-10-18 NOTE — PROGRESS NOTES
"Subjective   Jeanna Parks is a 73 y.o. female.     History of Present Illness  Wanting eval for poss rollator     Ms. Jeanna Parks is a 73-year-old female who presents today with concerns about weakness and balance issues.    The patient would like to obtain a rolling walker with a seat because she is having more trouble ambulating. She has weakness and left knee arthritis. She has been unable to go to the grocery store for 2 years. She is just now learning how to use the motorized carts in stores. The patient has a stool at home that she uses in the kitchen when she needs to stand for cooking and washing dishes. Her laundry is in the basement, and she can only go downstairs on her bottom and crawls to get back up. At times she has trouble making it to the toilet in time. She has enough room in her home to maneuver a rolling walker.    She states, \"It's been 2 years and I'm getting nowhere with them.\" She has been seeing Dr. Cotton for 1 year because her blood was supposed to be so bad. He prescribed over-the-counter iron and the last time she was there her iron was up to 11 g/dL. Dr. Cotton told her he would call Dr. Myers and tell him he needs to perform the surgery now. She is tired of walking the way she is and dragging her leg. It wears her out. She was scheduled for a knee arthroplasty, but it was canceled because her hemoglobin was low. She has called Dr. Myers's office without response.     The patient reports she saw Dr. Raymundo in 05/2023 and 06/2023 because he wanted her to have a 2-year stress test. He saw something and performed a cardiac catheterization. Nothing was found. Dr. Raymundo then told her that she was then cleared for knee surgery.     On the day of the catheterization, she learned that she had an appointment with Dr. Myers that same day. She has not been able to reschedule.     She is having trouble with balance when she rises too fast. She denies any chest pain. She notes " she does have trouble breathing sometimes, but not much.    She asks if she could get weight loss injections.    The following portions of the patient's history were reviewed and updated as appropriate: allergies, current medications, past family history, past medical history, past social history, past surgical history, and problem list.  Past Medical History:   Diagnosis Date    Arthritis     Congestive heart failure 12/30/2020    Gout     Hypertension 9/3/2015    Sciatica     Type 2 diabetes mellitus without complication, without long-term current use of insulin 9/3/2019    glucometer, test strips and lancets sent new onset start metformin DM education encouraged weight loss     Past Surgical History:   Procedure Laterality Date    CARDIAC CATHETERIZATION N/A 2/22/2021    Procedure: Cardiac Catheterization/Vascular Study;  Surgeon: Kit Jacques MD;  Location: [facility] CATH INVASIVE LOCATION;  Service: Cardiovascular;  Laterality: N/A;    CARDIAC CATHETERIZATION N/A 5/26/2021    Procedure: Left Heart Cath,impella assisted PCI;  Surgeon: Itz Raymundo MD;  Location: [facility] CATH INVASIVE LOCATION;  Service: Cardiovascular;  Laterality: N/A;    CARDIAC CATHETERIZATION N/A 5/26/2021    Procedure: Stent HADLEY coronary;  Surgeon: Itz Raymundo MD;  Location: [facility] CATH INVASIVE LOCATION;  Service: Cardiovascular;  Laterality: N/A;    CARDIAC CATHETERIZATION N/A 5/31/2023    Procedure: Left Heart Cath;  Surgeon: Itz Raymundo MD;  Location: [facility] CATH INVASIVE LOCATION;  Service: Cardiovascular;  Laterality: N/A;    CORONARY ANGIOPLASTY  05/26/2021    1x stent to LAD     Family History   Problem Relation Age of Onset    Heart disease Mother     Heart failure Mother     Heart disease Sister     Heart failure Sister     Heart disease Sister     Heart failure Sister      Social History     Socioeconomic History    Marital status:    Tobacco Use    Smoking status: Former  "    Types: Cigarettes     Quit date: 2020     Years since quittin.8     Passive exposure: Past    Smokeless tobacco: Never    Tobacco comments:     quit a month ago   Vaping Use    Vaping Use: Never used   Substance and Sexual Activity    Alcohol use: No    Drug use: No    Sexual activity: Defer         Current Outpatient Medications:     albuterol sulfate  (90 Base) MCG/ACT inhaler, Inhale 2 puffs Every 4 (Four) Hours As Needed for Wheezing., Disp: 18 g, Rfl: 5    allopurinol (ZYLOPRIM) 100 MG tablet, TAKE 1 TABLET DAILY, Disp: 90 tablet, Rfl: 0    amLODIPine (NORVASC) 5 MG tablet, Take 1 tablet by mouth Daily., Disp: 90 tablet, Rfl: 3    atorvastatin (LIPITOR) 40 MG tablet, TAKE 1 TABLET EVERY NIGHT, Disp: 90 tablet, Rfl: 3    budesonide-formoterol (Symbicort) 80-4.5 MCG/ACT inhaler, Inhale 2 puffs 2 (Two) Times a Day., Disp: 10.2 g, Rfl: 5    lisinopril (PRINIVIL,ZESTRIL) 2.5 MG tablet, TAKE 1 TABLET DAILY, Disp: 90 tablet, Rfl: 0    metoprolol succinate XL (TOPROL-XL) 25 MG 24 hr tablet, TAKE 1 TABLET DAILY, Disp: 90 tablet, Rfl: 3    mometasone (Elocon) 0.1 % cream, Apply 1 application  topically to the appropriate area as directed Daily., Disp: 15 g, Rfl: 0    ticagrelor (BRILINTA) 90 MG tablet tablet, Take 1 tablet by mouth 2 (Two) Times a Day., Disp: 32 tablet, Rfl: 0    warfarin (COUMADIN) 5 MG tablet, TAKE 1 AND 1/2 TABLET BY MOUTH DAILY ON  AND FRIDAY AND TAKE ONE TABLET BY MOUTH DAILY ON TUESDAY,THURSDAY, SATURDAY AND  (Patient taking differently: Take 7.5mg on M,, and 5mg all other days.), Disp: 115 tablet, Rfl: 0    Review of Systems  Review of systems was performed, and pertinent findings are noted in the HPI.    /80 (BP Location: Left arm, Patient Position: Sitting, Cuff Size: Large Adult)   Pulse 96   Temp 97.6 °F (36.4 °C) (Temporal)   Ht 157.5 cm (62\")   Wt 120 kg (264 lb)   LMP  (LMP Unknown)   SpO2 98%   BMI 48.29 kg/m²   Class 3 Severe " Obesity (BMI >=40). Obesity-related health conditions include the following: hypertension. Obesity is unchanged. BMI is is above average; BMI management plan is completed. We discussed portion control and increasing exercise.       Objective   Physical Exam  Vitals and nursing note reviewed.   Constitutional:       General: She is not in acute distress.     Appearance: Normal appearance. She is well-developed and well-groomed. She is morbidly obese.   Cardiovascular:      Rate and Rhythm: Normal rate and regular rhythm.      Heart sounds: Normal heart sounds. No murmur heard.     No friction rub. No gallop.   Pulmonary:      Effort: Pulmonary effort is normal. No respiratory distress.      Breath sounds: Normal breath sounds. No wheezing or rales.   Musculoskeletal:      Left knee: Crepitus present. No effusion, erythema or ecchymosis. Decreased range of motion. Tenderness present.      Right lower leg: No edema.      Left lower leg: No edema.   Neurological:      Mental Status: She is alert.      Motor: Motor function is intact.      Deep Tendon Reflexes: Reflexes are normal and symmetric.      Comments: LE strength 4/5     Psychiatric:         Attention and Perception: Attention normal.         Behavior: Behavior is cooperative.       The patient's blood work from 05/2023 was reviewed. Her anemia has been stable.    Assessment & Plan   Problems Addressed this Visit       Localized osteoarthritis of left knee - Primary     Other Visit Diagnoses       Weakness of both lower extremities              Diagnoses         Codes Comments    Localized osteoarthritis of left knee    -  Primary ICD-10-CM: M17.12  ICD-9-CM: 715.36     Weakness of both lower extremities     ICD-10-CM: R29.898  ICD-9-CM: 729.89           1. Localized osteoarthritis of the left knee for which she hopes to be able to have a knee replacement.  She is trying to get a follow-up appointment scheduled with her orthopedic surgeon.    2. Weakness of both  lower extremities.  For both diagnoses, I feel that she is no longer a good candidate for the cane that she is currently using. This leaves her a little unsteady and moving extremely slowly. I feel that having a rolling walker with a seat will improve her ability to do her ADL's independently as well as dramatically decrease her risk of falls.      Transcribed from ambient dictation for Latanya Hawk MD by Yu Castillo.  10/18/23   09:44 EDT    Patient or patient representative verbalized consent to the visit recording.  I have personally performed the services described in this document as transcribed by the above individual, and it is both accurate and complete.

## 2023-10-26 ENCOUNTER — ANTICOAGULATION VISIT (OUTPATIENT)
Dept: FAMILY MEDICINE CLINIC | Facility: CLINIC | Age: 73
End: 2023-10-26
Payer: MEDICARE

## 2023-10-26 LAB — INR PPP: 2.1 (ref 2–3)

## 2023-10-27 RX ORDER — ALLOPURINOL 100 MG/1
TABLET ORAL
Qty: 90 TABLET | Refills: 0 | Status: SHIPPED | OUTPATIENT
Start: 2023-10-27

## 2023-11-28 ENCOUNTER — ANTICOAGULATION VISIT (OUTPATIENT)
Dept: FAMILY MEDICINE CLINIC | Facility: CLINIC | Age: 73
End: 2023-11-28
Payer: MEDICARE

## 2023-11-28 LAB — INR PPP: 3.9 (ref 2–3)

## 2023-12-12 ENCOUNTER — ANTICOAGULATION VISIT (OUTPATIENT)
Dept: FAMILY MEDICINE CLINIC | Facility: CLINIC | Age: 73
End: 2023-12-12
Payer: MEDICARE

## 2023-12-12 LAB — INR PPP: 2.1 (ref 2–3)

## 2023-12-24 NOTE — PROGRESS NOTES
Subjective:     Encounter Date:12/27/2023      Patient ID: Jeanna Parks is a 73 y.o. female.    Chief Complaint and history of present illness:     Follow-up for CAD, CHF, cardiomyopathy, hypertension, diabetes, dyslipidemia     History of Present Illness  :     Ms. Jeanna Parks has PMH of      CAD, cath 2/22/2021 severe proximal LAD disease, 5/26/2021 HADLEY to ostial LAD, cath 5/31/2023 revealing patent ostial LAD stent, moderate disease in ostium of ramus intermedius, normal LV systolic function and LVEDP  HFrEF, right and left heart cath 2/21/2021 revealing severe pulmonary hypertension, severe TR, MR-  Moderate to severe mitral regurgitation  Severe TR, severe pulmonary hypertension  Cardiomyopathy, combination of ischemic plus or minus valvular heart disease  Hypertension  Dyslipidemia  diabetes   Obesity with BMI over 70  DVT  PE 2/25/2021  Gout, sciatica  No history of any surgeries  Positive family history of premature heart disease in multiple sisters and mother.  Former smoker quit this year        Is here for    follow-up.  Patient denies any chest pain.  Patient has some dyspnea on exertion which is mild.    Patient's arterial blood pressure is 119/81, heart rate 93, O2 sat of 98% on room air..  BMI is over 50.     Patient had a cardiac cath 2/22/2021 which revealed subtotal occlusion of proximal LAD  5/26/2021 HADLEY to ostial LAD with Impella backup  Echocardiogram 2/22/2021 reviewed revealing LV dysfunction with EF of 35-40% and diastolic dysfunction with RV enlargement and severe pulmonary hypertension and moderate to severe mitral regurgitation and biatrial enlargement.  Reviewed detailed hospital records.  Patient was in the hospital 2/19/2021-3/2/2021 with shortness of breath was diagnosed with acute PE, severe MR, severe TR, pulmonary hypertension, cor pulmonale, CAD.  Review of records reveal labs from 12/31/2020 cholesterol 137 triglycerides 129, HDL 46, LDL 68.  Hemoglobin A1c  6/30/2019 was 6.4 is improved to 5.4 on 12/30/20.  Normal CMP 12/30/2019 except for elevated glucose at 130.  Labs from 3/15/2022 reveal INR of 2.2.  Labs from 4/17/2023 revealed BUN/creatinine of 47/1.84.  GFR 29.  Lipid profile with cholesterol 97, triglycerides 72, HDL 42, LDL 40.  Labs from 5/30/2023 revealed a creatinine of 1.27 GFR 45.  Labs from 5/10/2023 revealed normal proBNP of 400.  INR 6/11/2023 was 2.2.  INR 12/12/2023 was 2.10.        Assessment ;       Dyspnea on exertion  Valvular heart disease with severe MR and TR  CAD, PCI  Ischemic cardiomyopathy EF of 35 to 40%  Valvular heart disease with severe MR and severe TR  Severe pulmonary hypertension  Hypertension  Dyslipidemia  Type 2 diabetes  CKD  Morbid obesity with BMI over 50     Recommendations and plan     Will check an echocardiogram to assess valvular heart disease  Patient is on Brilinta is complaining of dyspnea will DC Brilinta and start her on Plavix since it has been over 2 years since her stent.  Continue medical management with clopidogrel, atorvastatin, metoprolol succinate, amlodipine, warfarin   to help with CAD, CHF, cardiomyopathy, valvular heart disease, pulmonary hypertension as tolerated.  Monitor renal function and electrolytes  Reviewed diabetes with patient, patient says she does not have diabetes, was on metformin which was causing diarrhea and PMD stopped metformin.    I reviewed labs with patient from 6/30/2019 with A1c of 6.4.  We will continue atorvastatin for dyslipidemia.  We will continue warfarin for DVT PE, patient was seen by hematology Dr. Falcon and was advised to be on lifelong anticoagulation for unprovoked DVT PE.  Reviewed BMI over 50,, counseled on weight loss diet and exercise.        Procedures      Lexiscan Cardiolite performed 5/11/2023 reveals large anterior and apical MI with eri-infarct ischemia EF of 41%      ECG 12 Lead    Date/Time: 12/27/2023 12:27 PM  Performed by: Itz Raymundo,  MD    Authorized by: Itz Raymundo MD  Comparison: compared with previous ECG from 4/18/2023  Comparison to previous ECG: EKG done today reviewed/interpreted by me reveals sinus rhythm with rate of 86 bpm. No new change compared with 4/18/2023          Copied text in this portion of the note has been reviewed and is accurate as of 12/27/2023  The following portions of the patient's history were reviewed and updated as appropriate: allergies, current medications, past family history, past medical history, past social history, past surgical history and problem list.    Assessment:         [facility]       Diagnosis Plan   1. Dyspnea on exertion  Adult Transthoracic Echo Complete W/ Cont if Necessary Per Protocol      2. Severe mitral regurgitation  Adult Transthoracic Echo Complete W/ Cont if Necessary Per Protocol      3. CAD S/P percutaneous coronary angioplasty  Adult Transthoracic Echo Complete W/ Cont if Necessary Per Protocol      4. Essential hypertension  Adult Transthoracic Echo Complete W/ Cont if Necessary Per Protocol      5. Dyslipidemia  Adult Transthoracic Echo Complete W/ Cont if Necessary Per Protocol      6. Long term (current) use of anticoagulants  Adult Transthoracic Echo Complete W/ Cont if Necessary Per Protocol             Plan:               Past Medical History:  Past Medical History:   Diagnosis Date    Arthritis     Congestive heart failure 12/30/2020    Gout     Hypertension 09/03/2015    Preoperative cardiovascular examination 05/31/2023    Sciatica     Type 2 diabetes mellitus without complication, without long-term current use of insulin 09/03/2019    glucometer, test strips and lancets sent new onset start metformin DM education encouraged weight loss     Past Surgical History:  Past Surgical History:   Procedure Laterality Date    CARDIAC CATHETERIZATION N/A 2/22/2021    Procedure: Cardiac Catheterization/Vascular Study;  Surgeon: Kit Jacques MD;  Location: [facility]  CATH INVASIVE LOCATION;  Service: Cardiovascular;  Laterality: N/A;    CARDIAC CATHETERIZATION N/A 5/26/2021    Procedure: Left Heart Cath,impella assisted PCI;  Surgeon: Itz Raymundo MD;  Location: [facility] CATH INVASIVE LOCATION;  Service: Cardiovascular;  Laterality: N/A;    CARDIAC CATHETERIZATION N/A 5/26/2021    Procedure: Stent HADLEY coronary;  Surgeon: Itz Raymundo MD;  Location: [facility] CATH INVASIVE LOCATION;  Service: Cardiovascular;  Laterality: N/A;    CARDIAC CATHETERIZATION N/A 5/31/2023    Procedure: Left Heart Cath;  Surgeon: Itz Raymundo MD;  Location: [facility] CATH INVASIVE LOCATION;  Service: Cardiovascular;  Laterality: N/A;    CORONARY ANGIOPLASTY  05/26/2021    1x stent to LAD      Allergies:  No Known Allergies  Home Meds:  Current Meds:     Current Outpatient Medications:     albuterol sulfate  (90 Base) MCG/ACT inhaler, Inhale 2 puffs Every 4 (Four) Hours As Needed for Wheezing., Disp: 18 g, Rfl: 5    allopurinol (ZYLOPRIM) 100 MG tablet, TAKE 1 TABLET DAILY, Disp: 90 tablet, Rfl: 0    amLODIPine (NORVASC) 5 MG tablet, Take 1 tablet by mouth Daily., Disp: 90 tablet, Rfl: 3    atorvastatin (LIPITOR) 40 MG tablet, TAKE 1 TABLET EVERY NIGHT, Disp: 90 tablet, Rfl: 3    budesonide-formoterol (Symbicort) 80-4.5 MCG/ACT inhaler, Inhale 2 puffs 2 (Two) Times a Day., Disp: 10.2 g, Rfl: 5    metoprolol succinate XL (TOPROL-XL) 25 MG 24 hr tablet, TAKE 1 TABLET DAILY, Disp: 90 tablet, Rfl: 3    mometasone (Elocon) 0.1 % cream, Apply 1 application  topically to the appropriate area as directed Daily., Disp: 15 g, Rfl: 0    warfarin (COUMADIN) 5 MG tablet, TAKE 1 AND 1/2 TABLET BY MOUTH DAILY ON Monday Wednesday AND FRIDAY AND TAKE ONE TABLET BY MOUTH DAILY ON TUESDAY,THURSDAY, SATURDAY AND SUNDAY (Patient taking differently: Take 7.5mg on M,W,F and 5mg all other days.), Disp: 115 tablet, Rfl: 0    clopidogrel (PLAVIX) 75 MG tablet, Take 1 tablet by mouth Daily.,  "Disp: 90 tablet, Rfl: 3  Social History:   Social History     Tobacco Use    Smoking status: Former     Types: Cigarettes     Quit date: 12/1/2020     Years since quitting: 3.0     Passive exposure: Past    Smokeless tobacco: Never    Tobacco comments:     quit a month ago   Substance Use Topics    Alcohol use: No      Family History:  Family History   Problem Relation Age of Onset    Heart disease Mother     Heart failure Mother     Heart disease Sister     Heart failure Sister     Heart disease Sister     Heart failure Sister               Review of Systems   Cardiovascular:  Negative for chest pain, leg swelling and palpitations.   Respiratory:  Positive for shortness of breath.    Neurological:  Negative for dizziness and numbness.     All other systems are negative         Objective:     Physical Exam  /81 (BP Location: Left arm, Patient Position: Sitting, Cuff Size: Large Adult)   Pulse 93   Ht 157.5 cm (62\")   Wt 129 kg (285 lb)   LMP  (LMP Unknown)   SpO2 98%   BMI 52.13 kg/m²   General:  Appears in no acute distress, morbidly obese  Eyes: Sclera is anicteric,  conjunctiva is clear   HEENT:  No JVD.  No carotid bruits  Respiratory: Respirations regular and unlabored at rest.  Clear to auscultation  Cardiovascular: S1,S2 Regular rate and rhythm.  2 out of 6 holosystolic murmur.   Extremities: No digital clubbing or cyanosis, no edema  Skin: Color pink. Skin warm and dry to touch. No rashes  No xanthoma  Neuro: Alert and awake.    Lab Reviewed:         Itz Raymundo MD  12/27/2023 12:42 EST      EMR Dragon/Transcription:   \"Dictated utilizing Dragon dictation\".        "

## 2023-12-27 ENCOUNTER — OFFICE VISIT (OUTPATIENT)
Dept: CARDIOLOGY | Facility: CLINIC | Age: 73
End: 2023-12-27
Payer: MEDICARE

## 2023-12-27 VITALS
SYSTOLIC BLOOD PRESSURE: 119 MMHG | HEART RATE: 93 BPM | HEIGHT: 62 IN | DIASTOLIC BLOOD PRESSURE: 81 MMHG | OXYGEN SATURATION: 98 % | BODY MASS INDEX: 52.44 KG/M2 | WEIGHT: 285 LBS

## 2023-12-27 DIAGNOSIS — Z79.01 LONG TERM (CURRENT) USE OF ANTICOAGULANTS: ICD-10-CM

## 2023-12-27 DIAGNOSIS — Z98.61 CAD S/P PERCUTANEOUS CORONARY ANGIOPLASTY: ICD-10-CM

## 2023-12-27 DIAGNOSIS — I25.10 CAD S/P PERCUTANEOUS CORONARY ANGIOPLASTY: ICD-10-CM

## 2023-12-27 DIAGNOSIS — I34.0 SEVERE MITRAL REGURGITATION: ICD-10-CM

## 2023-12-27 DIAGNOSIS — I10 ESSENTIAL HYPERTENSION: ICD-10-CM

## 2023-12-27 DIAGNOSIS — R06.09 DYSPNEA ON EXERTION: Primary | ICD-10-CM

## 2023-12-27 DIAGNOSIS — E78.5 DYSLIPIDEMIA: ICD-10-CM

## 2023-12-27 RX ORDER — CLOPIDOGREL BISULFATE 75 MG/1
75 TABLET ORAL DAILY
Qty: 90 TABLET | Refills: 3 | Status: SHIPPED | OUTPATIENT
Start: 2023-12-27

## 2024-01-12 ENCOUNTER — ANTICOAGULATION VISIT (OUTPATIENT)
Dept: FAMILY MEDICINE CLINIC | Facility: CLINIC | Age: 74
End: 2024-01-12
Payer: MEDICARE

## 2024-01-12 DIAGNOSIS — Z79.01 LONG TERM (CURRENT) USE OF ANTICOAGULANTS: Primary | ICD-10-CM

## 2024-01-12 DIAGNOSIS — I26.09 ACUTE PULMONARY EMBOLISM WITH ACUTE COR PULMONALE, UNSPECIFIED PULMONARY EMBOLISM TYPE: ICD-10-CM

## 2024-01-12 LAB — INR PPP: 1.8 (ref 2–3)

## 2024-01-12 PROCEDURE — 85610 PROTHROMBIN TIME: CPT | Performed by: FAMILY MEDICINE

## 2024-01-12 PROCEDURE — 36416 COLLJ CAPILLARY BLOOD SPEC: CPT | Performed by: FAMILY MEDICINE

## 2024-01-26 ENCOUNTER — ANTICOAGULATION VISIT (OUTPATIENT)
Dept: FAMILY MEDICINE CLINIC | Facility: CLINIC | Age: 74
End: 2024-01-26
Payer: MEDICARE

## 2024-01-26 DIAGNOSIS — I26.09 ACUTE PULMONARY EMBOLISM WITH ACUTE COR PULMONALE, UNSPECIFIED PULMONARY EMBOLISM TYPE: ICD-10-CM

## 2024-01-26 DIAGNOSIS — Z79.01 LONG TERM (CURRENT) USE OF ANTICOAGULANTS: Primary | ICD-10-CM

## 2024-01-26 LAB — INR PPP: 2.3 (ref 2–3)

## 2024-01-26 PROCEDURE — 85610 PROTHROMBIN TIME: CPT | Performed by: FAMILY MEDICINE

## 2024-01-26 PROCEDURE — 36416 COLLJ CAPILLARY BLOOD SPEC: CPT | Performed by: FAMILY MEDICINE

## 2024-03-03 RX ORDER — WARFARIN SODIUM 5 MG/1
TABLET ORAL
Qty: 109 TABLET | Refills: 0 | Status: SHIPPED | OUTPATIENT
Start: 2024-03-03

## 2024-03-12 ENCOUNTER — OFFICE (OUTPATIENT)
Dept: URBAN - METROPOLITAN AREA CLINIC 64 | Facility: CLINIC | Age: 74
End: 2024-03-12
Payer: MEDICAID

## 2024-03-12 VITALS
DIASTOLIC BLOOD PRESSURE: 84 MMHG | WEIGHT: 291 LBS | HEART RATE: 89 BPM | SYSTOLIC BLOOD PRESSURE: 130 MMHG | HEIGHT: 63 IN

## 2024-03-12 DIAGNOSIS — I25.10 ATHEROSCLEROTIC HEART DISEASE OF NATIVE CORONARY ARTERY WITH: ICD-10-CM

## 2024-03-12 DIAGNOSIS — K59.00 CONSTIPATION, UNSPECIFIED: ICD-10-CM

## 2024-03-12 DIAGNOSIS — R19.5 OTHER FECAL ABNORMALITIES: ICD-10-CM

## 2024-03-12 PROCEDURE — 99204 OFFICE O/P NEW MOD 45 MIN: CPT | Performed by: INTERNAL MEDICINE

## 2024-04-22 ENCOUNTER — TELEPHONE (OUTPATIENT)
Dept: FAMILY MEDICINE CLINIC | Facility: CLINIC | Age: 74
End: 2024-04-22
Payer: MEDICARE

## 2024-04-22 ENCOUNTER — ANTICOAGULATION VISIT (OUTPATIENT)
Dept: FAMILY MEDICINE CLINIC | Facility: CLINIC | Age: 74
End: 2024-04-22
Payer: MEDICARE

## 2024-04-22 NOTE — TELEPHONE ENCOUNTER
Pt is overdue for INR check. Pt is also supposed to be seen every 6 months with Dr. Cisneros, but no showed her recent appointment. She needs 2 appointments please. 1 for INR check, and 1 for follow up.

## 2024-05-10 ENCOUNTER — TELEPHONE (OUTPATIENT)
Dept: CARDIOLOGY | Facility: CLINIC | Age: 74
End: 2024-05-10
Payer: MEDICARE

## 2024-05-11 RX ORDER — WARFARIN SODIUM 5 MG/1
TABLET ORAL
Qty: 109 TABLET | Refills: 0 | OUTPATIENT
Start: 2024-05-11

## 2024-05-13 RX ORDER — AMLODIPINE BESYLATE 5 MG/1
5 TABLET ORAL DAILY
Qty: 90 TABLET | Refills: 1 | Status: SHIPPED | OUTPATIENT
Start: 2024-05-13

## 2024-05-13 RX ORDER — METOPROLOL SUCCINATE 25 MG/1
TABLET, EXTENDED RELEASE ORAL
Qty: 90 TABLET | Refills: 1 | Status: SHIPPED | OUTPATIENT
Start: 2024-05-13

## 2024-05-13 NOTE — TELEPHONE ENCOUNTER
Rx Refill Note  Requested Prescriptions     Pending Prescriptions Disp Refills    amLODIPine (NORVASC) 5 MG tablet [Pharmacy Med Name: Amlodipine Besylate Tablet 5 Mg] 90 tablet 1     Sig: TAKE 1 TABLET DAILY    metoprolol succinate XL (TOPROL-XL) 25 MG 24 hr tablet [Pharmacy Med Name: METOPROLOL SUCCINATE ER 25MG TB24] 90 tablet 1     Sig: TAKE 1 TABLET DAILY      Last office visit with prescribing clinician: 12/27/2023   Last telemedicine visit with prescribing clinician: Visit date not found   Next office visit with prescribing clinician: 6/27/2024                         Would you like a call back once the refill request has been completed: [] Yes [] No    If the office needs to give you a call back, can they leave a voicemail: [] Yes [] No    Socorro Huff MA  05/13/24, 08:58 EDT

## 2024-05-29 ENCOUNTER — HOSPITAL ENCOUNTER (OUTPATIENT)
Facility: HOSPITAL | Age: 74
Setting detail: HOSPITAL OUTPATIENT SURGERY
Discharge: HOME | End: 2024-05-29
Attending: INTERNAL MEDICINE | Admitting: INTERNAL MEDICINE
Payer: MEDICARE

## 2024-05-29 ENCOUNTER — ANESTHESIA EVENT (OUTPATIENT)
Dept: GASTROENTEROLOGY | Facility: HOSPITAL | Age: 74
End: 2024-05-29
Payer: MEDICARE

## 2024-05-29 ENCOUNTER — ON CAMPUS - OUTPATIENT (OUTPATIENT)
Dept: URBAN - METROPOLITAN AREA HOSPITAL 85 | Facility: HOSPITAL | Age: 74
End: 2024-05-29
Payer: MEDICAID

## 2024-05-29 ENCOUNTER — ANESTHESIA (OUTPATIENT)
Dept: GASTROENTEROLOGY | Facility: HOSPITAL | Age: 74
End: 2024-05-29
Payer: MEDICARE

## 2024-05-29 VITALS
DIASTOLIC BLOOD PRESSURE: 59 MMHG | BODY MASS INDEX: 53.39 KG/M2 | TEMPERATURE: 97.7 F | HEART RATE: 97 BPM | HEIGHT: 62 IN | WEIGHT: 290.13 LBS | OXYGEN SATURATION: 99 % | SYSTOLIC BLOOD PRESSURE: 115 MMHG | RESPIRATION RATE: 16 BRPM

## 2024-05-29 DIAGNOSIS — R19.5 POSITIVE COLORECTAL CANCER SCREENING USING COLOGUARD TEST: ICD-10-CM

## 2024-05-29 DIAGNOSIS — R19.5 OTHER FECAL ABNORMALITIES: ICD-10-CM

## 2024-05-29 DIAGNOSIS — Z12.11 SCREEN FOR COLON CANCER: ICD-10-CM

## 2024-05-29 DIAGNOSIS — D12.8 BENIGN NEOPLASM OF RECTUM: ICD-10-CM

## 2024-05-29 DIAGNOSIS — K64.1 SECOND DEGREE HEMORRHOIDS: ICD-10-CM

## 2024-05-29 DIAGNOSIS — K57.30 DIVERTICULOSIS OF LARGE INTESTINE WITHOUT PERFORATION OR ABS: ICD-10-CM

## 2024-05-29 DIAGNOSIS — K63.5 POLYP OF COLON: ICD-10-CM

## 2024-05-29 DIAGNOSIS — Z12.11 ENCOUNTER FOR SCREENING FOR MALIGNANT NEOPLASM OF COLON: ICD-10-CM

## 2024-05-29 DIAGNOSIS — D12.2 BENIGN NEOPLASM OF ASCENDING COLON: ICD-10-CM

## 2024-05-29 DIAGNOSIS — D12.3 BENIGN NEOPLASM OF TRANSVERSE COLON: ICD-10-CM

## 2024-05-29 LAB
INR PPP: 0.99 (ref 2–3)
PROTHROMBIN TIME: 10.8 SECONDS (ref 19.4–28.5)

## 2024-05-29 PROCEDURE — 88305 TISSUE EXAM BY PATHOLOGIST: CPT | Performed by: INTERNAL MEDICINE

## 2024-05-29 PROCEDURE — 25810000003 SODIUM CHLORIDE 0.9 % SOLUTION: Performed by: NURSE ANESTHETIST, CERTIFIED REGISTERED

## 2024-05-29 PROCEDURE — 85610 PROTHROMBIN TIME: CPT | Performed by: INTERNAL MEDICINE

## 2024-05-29 PROCEDURE — 45385 COLONOSCOPY W/LESION REMOVAL: CPT | Mod: PT | Performed by: INTERNAL MEDICINE

## 2024-05-29 PROCEDURE — 25010000002 PROPOFOL 1000 MG/100ML EMULSION: Performed by: NURSE ANESTHETIST, CERTIFIED REGISTERED

## 2024-05-29 PROCEDURE — 25810000003 SODIUM CHLORIDE 0.9 % SOLUTION: Performed by: INTERNAL MEDICINE

## 2024-05-29 RX ORDER — ONDANSETRON 2 MG/ML
4 INJECTION INTRAMUSCULAR; INTRAVENOUS ONCE AS NEEDED
Status: DISCONTINUED | OUTPATIENT
Start: 2024-05-29 | End: 2024-05-29 | Stop reason: HOSPADM

## 2024-05-29 RX ORDER — PHENYLEPHRINE HCL IN 0.9% NACL 1 MG/10 ML
SYRINGE (ML) INTRAVENOUS AS NEEDED
Status: DISCONTINUED | OUTPATIENT
Start: 2024-05-29 | End: 2024-05-29 | Stop reason: SURG

## 2024-05-29 RX ORDER — EPHEDRINE SULFATE 5 MG/ML
5 INJECTION INTRAVENOUS ONCE AS NEEDED
Status: DISCONTINUED | OUTPATIENT
Start: 2024-05-29 | End: 2024-05-29 | Stop reason: HOSPADM

## 2024-05-29 RX ORDER — DIPHENHYDRAMINE HYDROCHLORIDE 50 MG/ML
12.5 INJECTION INTRAMUSCULAR; INTRAVENOUS
Status: DISCONTINUED | OUTPATIENT
Start: 2024-05-29 | End: 2024-05-29 | Stop reason: HOSPADM

## 2024-05-29 RX ORDER — PROPOFOL 10 MG/ML
INJECTION, EMULSION INTRAVENOUS AS NEEDED
Status: DISCONTINUED | OUTPATIENT
Start: 2024-05-29 | End: 2024-05-29 | Stop reason: SURG

## 2024-05-29 RX ORDER — IPRATROPIUM BROMIDE AND ALBUTEROL SULFATE 2.5; .5 MG/3ML; MG/3ML
3 SOLUTION RESPIRATORY (INHALATION) ONCE AS NEEDED
Status: DISCONTINUED | OUTPATIENT
Start: 2024-05-29 | End: 2024-05-29 | Stop reason: HOSPADM

## 2024-05-29 RX ORDER — SODIUM CHLORIDE 9 MG/ML
INJECTION, SOLUTION INTRAVENOUS CONTINUOUS PRN
Status: DISCONTINUED | OUTPATIENT
Start: 2024-05-29 | End: 2024-05-29 | Stop reason: SURG

## 2024-05-29 RX ORDER — LIDOCAINE HYDROCHLORIDE 20 MG/ML
INJECTION, SOLUTION INFILTRATION; PERINEURAL AS NEEDED
Status: DISCONTINUED | OUTPATIENT
Start: 2024-05-29 | End: 2024-05-29 | Stop reason: SURG

## 2024-05-29 RX ORDER — LABETALOL HYDROCHLORIDE 5 MG/ML
5 INJECTION, SOLUTION INTRAVENOUS
Status: DISCONTINUED | OUTPATIENT
Start: 2024-05-29 | End: 2024-05-29 | Stop reason: HOSPADM

## 2024-05-29 RX ORDER — KETAMINE HCL IN NACL, ISO-OSM 100MG/10ML
SYRINGE (ML) INJECTION AS NEEDED
Status: DISCONTINUED | OUTPATIENT
Start: 2024-05-29 | End: 2024-05-29 | Stop reason: SURG

## 2024-05-29 RX ORDER — MEPERIDINE HYDROCHLORIDE 25 MG/ML
12.5 INJECTION INTRAMUSCULAR; INTRAVENOUS; SUBCUTANEOUS
Status: DISCONTINUED | OUTPATIENT
Start: 2024-05-29 | End: 2024-05-29 | Stop reason: HOSPADM

## 2024-05-29 RX ORDER — HYDRALAZINE HYDROCHLORIDE 20 MG/ML
5 INJECTION INTRAMUSCULAR; INTRAVENOUS
Status: DISCONTINUED | OUTPATIENT
Start: 2024-05-29 | End: 2024-05-29 | Stop reason: HOSPADM

## 2024-05-29 RX ORDER — SODIUM CHLORIDE 9 MG/ML
10 INJECTION, SOLUTION INTRAVENOUS ONCE
Status: COMPLETED | OUTPATIENT
Start: 2024-05-29 | End: 2024-05-29

## 2024-05-29 RX ADMIN — Medication 100 MCG: at 11:51

## 2024-05-29 RX ADMIN — PROPOFOL INJECTABLE EMULSION 50 MG: 10 INJECTION, EMULSION INTRAVENOUS at 12:02

## 2024-05-29 RX ADMIN — PROPOFOL INJECTABLE EMULSION 100 MG: 10 INJECTION, EMULSION INTRAVENOUS at 11:45

## 2024-05-29 RX ADMIN — PROPOFOL INJECTABLE EMULSION 50 MG: 10 INJECTION, EMULSION INTRAVENOUS at 11:55

## 2024-05-29 RX ADMIN — Medication 25 MG: at 11:45

## 2024-05-29 RX ADMIN — SODIUM CHLORIDE 10 ML/HR: 9 INJECTION, SOLUTION INTRAVENOUS at 10:14

## 2024-05-29 RX ADMIN — PROPOFOL INJECTABLE EMULSION 50 MG: 10 INJECTION, EMULSION INTRAVENOUS at 11:58

## 2024-05-29 RX ADMIN — Medication 25 MG: at 11:55

## 2024-05-29 RX ADMIN — SODIUM CHLORIDE: 9 INJECTION, SOLUTION INTRAVENOUS at 11:40

## 2024-05-29 RX ADMIN — PROPOFOL INJECTABLE EMULSION 50 MG: 10 INJECTION, EMULSION INTRAVENOUS at 11:50

## 2024-05-29 RX ADMIN — LIDOCAINE HYDROCHLORIDE 80 MG: 20 INJECTION, SOLUTION INFILTRATION; PERINEURAL at 11:45

## 2024-05-29 NOTE — ANESTHESIA PREPROCEDURE EVALUATION
Anesthesia Evaluation     NPO Solid Status: > 8 hours  NPO Liquid Status: > 2 hours           Airway   Mallampati: II  TM distance: >3 FB  Neck ROM: full  No difficulty expected  Dental - normal exam     Pulmonary - normal exam   (+) pulmonary embolism,shortness of breath  Cardiovascular - normal exam    (+) hypertension, valvular problems/murmurs, CAD, cardiac stents more than 12 months ago , angina, CHF Systolic <55%    ROS comment: LVE with possible paradoxical septal motion with estimated LV ejection fraction of 45%  Normal RV size  Severe left atrial enlargement seen.  Interatrial septum appears aneurysmal.  Mitral annular calcification seen.  Mitral valve leaflets have good cusp separation.  Would recommend transesophageal echo to better evaluate aortic valve and severe left atrial enlargement if clinically indicated  Aortic valve, tricuspid valve appears structurally normal, trace tricuspid regurgitation seen.  Calculated RV systolic pressure of 32 mmHg  No pericardial effusion seen.  Proximal aorta appears normal in size.      Neuro/Psych  (+) numbness, psychiatric history  GI/Hepatic/Renal/Endo    (+) morbid obesity, diabetes mellitus type 2    Musculoskeletal     Abdominal  - normal exam    Bowel sounds: normal.   Substance History      OB/GYN          Other   arthritis,                 Anesthesia Plan    ASA 4     MAC   total IV anesthesia  intravenous induction     Anesthetic plan, risks, benefits, and alternatives have been provided, discussed and informed consent has been obtained with: patient.  Pre-procedure education provided  Plan discussed with CRNA.    CODE STATUS:

## 2024-05-29 NOTE — ANESTHESIA POSTPROCEDURE EVALUATION
Patient: Jeanna Parks    Procedure Summary       Date: 05/29/24 Room / Location: [facility] ENDOSCOPY 4 / [facility] ENDOSCOPY    Anesthesia Start: 1139 Anesthesia Stop: 1216    Procedure: COLONOSCOPY WITH POLYPECTOMY X7 Diagnosis:       Screen for colon cancer      Positive colorectal cancer screening using Cologuard test      (Screen for colon cancer [Z12.11])      (Positive colorectal cancer screening using Cologuard test [R19.5])    Surgeons: Dawood Boyce MD Provider: Rambo Ventura MD    Anesthesia Type: MAC ASA Status: 4            Anesthesia Type: MAC    Vitals  Vitals Value Taken Time   BP 92/51 05/29/24 1215   Temp     Pulse 85 05/29/24 1224   Resp 22 05/29/24 1215   SpO2 100 % 05/29/24 1224   Vitals shown include unfiled device data.        Post Anesthesia Care and Evaluation    Patient location during evaluation: PACU  Patient participation: complete - patient participated  Level of consciousness: awake  Pain scale: See nurse's notes for pain score.  Pain management: adequate    Airway patency: patent  Anesthetic complications: No anesthetic complications  PONV Status: none  Cardiovascular status: acceptable  Respiratory status: acceptable and spontaneous ventilation  Hydration status: acceptable    Comments: Patient seen and examined postoperatively; vital signs stable; SpO2 greater than or equal to 90%; cardiopulmonary status stable; nausea/vomiting adequately controlled; pain adequately controlled; no apparent anesthesia complications; patient discharged from anesthesia care when discharge criteria were met

## 2024-05-29 NOTE — H&P
GI CONSULT  NOTE:    Referring Provider:    Provider, No Known  Dawood Boyce MD    Chief complaint: <principal problem not specified>    Subjective .       Pre op diagnosis  Screen for colon cancer [Z12.11]  Positive colorectal cancer screening using Cologuard test [R19.5]      History of present illness:      Jeanna Parks is a 73 y.o. female who presents today for Procedure(s):  COLONOSCOPY for the indications listed below.     The updated Patient Profile was reviewed prior to the procedure, in conjunction with the Physical Exam, including medical conditions, surgical procedures, medications, allergies, family history and social history.     Pre-operatively, I reviewed the indication(s) for the procedure, the risks of the procedure [including but not limited to: unexpected bleeding possibly requiring hospitalization and/or unplanned repeat procedures, perforation possibly requiring surgical treatment, missed lesions and complications of sedation/MAC (also explained by anesthesia staff)].     I have evaluated the patient for risks associated with the planned anesthesia and the procedure to be performed and find the patient an acceptable candidate for IV sedation.    Multiple opportunities were provided for any questions or concerns, and all questions were answered satisfactorily before any anesthesia was administered. We will proceed with the planned procedure.    Past Medical History:  Past Medical History:   Diagnosis Date    Anticoagulant long-term use     WARFARIN, PLAVIX    Arthritis     Congestive heart failure 12/30/2020    COPD (chronic obstructive pulmonary disease)     Coronary artery disease     Gout     Hypertension 09/03/2015    Iron deficiency associated with familial restless legs syndrome     PE (pulmonary thromboembolism)     Preoperative cardiovascular examination 05/31/2023    Pulmonary hypertension     Sciatica     Type 2 diabetes mellitus without complication, without long-term  current use of insulin 09/03/2019    glucometer, test strips and lancets sent new onset start metformin DM education encouraged weight loss       Past Surgical History:  Past Surgical History:   Procedure Laterality Date    CARDIAC CATHETERIZATION N/A 2/22/2021    Procedure: Cardiac Catheterization/Vascular Study;  Surgeon: Kit Jacques MD;  Location: [facility] CATH INVASIVE LOCATION;  Service: Cardiovascular;  Laterality: N/A;    CARDIAC CATHETERIZATION N/A 5/26/2021    Procedure: Left Heart Cath,impella assisted PCI;  Surgeon: Itz Raymundo MD;  Location: [facility] CATH INVASIVE LOCATION;  Service: Cardiovascular;  Laterality: N/A;    CARDIAC CATHETERIZATION N/A 5/26/2021    Procedure: Stent HADLEY coronary;  Surgeon: Itz Raymundo MD;  Location: [facility] CATH INVASIVE LOCATION;  Service: Cardiovascular;  Laterality: N/A;    CARDIAC CATHETERIZATION N/A 5/31/2023    Procedure: Left Heart Cath;  Surgeon: Itz Raymundo MD;  Location: [facility] CATH INVASIVE LOCATION;  Service: Cardiovascular;  Laterality: N/A;    CORONARY ANGIOPLASTY  05/26/2021    1x stent to LAD       Social History:  Social History     Tobacco Use    Smoking status: Former     Current packs/day: 0.00     Types: Cigarettes     Quit date: 12/1/2020     Years since quitting: 3.4     Passive exposure: Past    Smokeless tobacco: Never    Tobacco comments:     quit a month ago   Vaping Use    Vaping status: Never Used   Substance Use Topics    Alcohol use: No    Drug use: No       Family History:  Family History   Problem Relation Age of Onset    Heart disease Mother     Heart failure Mother     Heart disease Sister     Heart failure Sister     Heart disease Sister     Heart failure Sister        Medications:  Medications Prior to Admission   Medication Sig Dispense Refill Last Dose    albuterol sulfate  (90 Base) MCG/ACT inhaler Inhale 2 puffs Every 4 (Four) Hours As Needed for Wheezing. 18 g 5 Past Month     "allopurinol (ZYLOPRIM) 100 MG tablet TAKE 1 TABLET DAILY (Patient taking differently: Take 1 tablet by mouth Daily.) 90 tablet 0 5/28/2024    amLODIPine (NORVASC) 5 MG tablet TAKE 1 TABLET DAILY 90 tablet 1 5/28/2024    atorvastatin (LIPITOR) 40 MG tablet TAKE 1 TABLET EVERY NIGHT (Patient taking differently: Take 1 tablet by mouth Every Night.) 90 tablet 3 5/28/2024    budesonide-formoterol (Symbicort) 80-4.5 MCG/ACT inhaler Inhale 2 puffs 2 (Two) Times a Day. 10.2 g 5 Past Month    clopidogrel (PLAVIX) 75 MG tablet Take 1 tablet by mouth Daily. 90 tablet 3 5/23/2024    metoprolol succinate XL (TOPROL-XL) 25 MG 24 hr tablet TAKE 1 TABLET DAILY (Patient taking differently: Take 1 tablet by mouth Daily.) 90 tablet 1 5/28/2024    mometasone (Elocon) 0.1 % cream Apply 1 application  topically to the appropriate area as directed Daily. 15 g 0 5/28/2024    warfarin (Jantoven) 5 MG tablet Take 7.5mg on M,W,F and 5mg all other days. 109 tablet 0 5/23/2024       Scheduled Meds:   Continuous Infusions:   PRN Meds:.  atropine    diphenhydrAMINE    ePHEDrine Sulfate (Pressors)    hydrALAZINE    ipratropium-albuterol    labetalol    lidocaine (cardiac)    meperidine    ondansetron    ALLERGIES:  Patient has no known allergies.    ROS:  The following systems were reviewed and negative;  Constitution:  No fevers, chills, no unintentional weight loss  Skin: no rash, no jaundice  Eyes:  No blurry vision, no eye pain  HENT:  No change in hearing or smell  Resp:  No dyspnea or cough  CV:  No chest pain or palpitations  :  No dysuria, hematuria  Musculoskeletal:  No leg cramps or arthralgias  Neuro:  No tremor, no numbness  Psych:  No depression or confsusion    Objective     Vital Signs:   Vitals:    05/29/24 0928   BP: 125/68   BP Location: Left arm   Patient Position: Lying   Pulse: 87   Resp: 19   Temp: 97.7 °F (36.5 °C)   TempSrc: Oral   SpO2: 96%   Weight: 132 kg (290 lb 2 oz)   Height: 157.5 cm (62\")       Physical Exam: "       General Appearance:    Awake and alert, in no acute distress   Head:    Normocephalic, without obvious abnormality, atraumatic   Throat:   No oral lesions, no thrush, oral mucosa moist   Lungs:     respirations regular, even and unlabored   Skin:   No rash, no jaundice       Results Review:  Lab Results (last 24 hours)       Procedure Component Value Units Date/Time    Protime-INR [[id number]]  (Abnormal) Collected: 05/29/24 1110    Specimen: Blood Updated: 05/29/24 1132     Protime 10.8 Seconds      INR 0.99            Imaging Results (Last 24 Hours)       ** No results found for the last 24 hours. **             I reviewed the patient's labs and imaging.    ASSESSMENT AND PLAN:      Active Problems:    * No active hospital problems. *       Procedure(s):  COLONOSCOPY      I discussed the patient's findings and my recommendations with the patient.    Dawood Boyce MD  05/29/24  11:42 EDT

## 2024-05-29 NOTE — DISCHARGE INSTRUCTIONS
A responsible adult should stay with you and you should rest quietly for the rest of the day.    Do not drink alcohol, drive, operate any heavy machinery or power tools or make any legal/important decisions for the next 24 hours.     Progress your diet as tolerated.  If you begin to experience severe pain, increased shortness of breath, racing heartbeat or a fever above 101 F, seek immediate medical attention.     Follow up with MD as instructed. Call office for results in 3 to 5 days if needed. Dr Boyce [phone]    Impression:  1.  2 polyps in the ascending colon that were 5 mm and sessile removed via cold snare  2.  1 polyp in the transverse colon is 5 mm and sessile removed via cold snare  3.  2 polyps in the sigmoid colon that were 5 mm and sessile removed via cold snare  4.  2 polyps in the rectum that were 4 mm to 7 mm and sessile removed via cold snare  5.  Diverticulosis moderate in severity with small to medium size openings in the sigmoid colon  6.  Grade 2 large internal hemorrhoids     Recommendations:  Follow-up biopsy results   Repeat colonoscopy in 3 years  If any rectal bleeding noted, proceed to internal hemorrhoid banding in the office        Dawood Boyce MD      Date: 5/29/2024    Time: 12:05 EDT

## 2024-05-29 NOTE — OP NOTE
COLONOSCOPY Procedure Report    Patient Name:  Jeanna Parks  YOB: 1950    Date of Surgery:  5/29/2024     Pre-Op Diagnosis:  Screen for colon cancer [Z12.11]  Positive colorectal cancer screening using Cologuard test [R19.5]       Postop diagnosis:  1.  Colon polyps  2.  Diverticulosis  3.  Internal hemorrhoids      Procedure/CPT® Codes:      Procedure(s):  COLONOSCOPY WITH POLYPECTOMY X7    Staff:  Surgeon(s):  Dawood Boyce MD      Anesthesia: Monitored Anesthesia Care    Description of Procedure:  A description of the procedure as well as risks, benefits and alternative methods were explained to the patient who voiced understanding and signed the corresponding consent form. A physical exam was performed and vital signs were monitored throughout the procedure.    A rectal exam was performed which was normal. An Olympus colonoscope was placed into the rectum and proceeded under direct visualization through the colon until the cecum and appendiceal orifice were identified. Careful visualization occurred upon slow withdraw of the scope. The scope was then retroflexed and the distal rectum was visualized. The quality of the prep was good. The procedure was not difficult and there were no immediate complications.  There was no blood loss.    Impression:  1.  2 polyps in the ascending colon that were 5 mm and sessile removed via cold snare  2.  1 polyp in the transverse colon is 5 mm and sessile removed via cold snare  3.  2 polyps in the sigmoid colon that were 5 mm and sessile removed via cold snare  4.  2 polyps in the rectum that were 4 mm to 7 mm and sessile removed via cold snare  5.  Diverticulosis moderate in severity with small to medium size openings in the sigmoid colon  6.  Grade 2 large internal hemorrhoids    Recommendations:  Follow-up biopsy results   Repeat colonoscopy in 3 years  If any rectal bleeding noted, proceed to internal hemorrhoid banding in the office      Dawood  MD Carli     Date: 5/29/2024    Time: 12:05 EDT

## 2024-05-30 LAB
LAB AP CASE REPORT: NORMAL
PATH REPORT.FINAL DX SPEC: NORMAL
PATH REPORT.GROSS SPEC: NORMAL

## 2024-06-04 ENCOUNTER — OFFICE (OUTPATIENT)
Dept: URBAN - METROPOLITAN AREA CLINIC 64 | Facility: CLINIC | Age: 74
End: 2024-06-04
Payer: MEDICAID

## 2024-06-04 VITALS
DIASTOLIC BLOOD PRESSURE: 81 MMHG | SYSTOLIC BLOOD PRESSURE: 139 MMHG | HEART RATE: 95 BPM | HEIGHT: 63 IN | WEIGHT: 292 LBS

## 2024-06-04 DIAGNOSIS — K62.89 OTHER SPECIFIED DISEASES OF ANUS AND RECTUM: ICD-10-CM

## 2024-06-04 DIAGNOSIS — Z86.010 PERSONAL HISTORY OF COLONIC POLYPS: ICD-10-CM

## 2024-06-04 DIAGNOSIS — K64.4 RESIDUAL HEMORRHOIDAL SKIN TAGS: ICD-10-CM

## 2024-06-04 PROCEDURE — 99213 OFFICE O/P EST LOW 20 MIN: CPT | Performed by: NURSE PRACTITIONER

## 2024-06-04 RX ORDER — HYDROCORTISONE ACETATE 25 MG/1
SUPPOSITORY RECTAL
Qty: 12 | Refills: 2 | Status: ACTIVE
Start: 2024-06-04

## 2024-06-24 RX ORDER — ATORVASTATIN CALCIUM 40 MG/1
40 TABLET, FILM COATED ORAL NIGHTLY
Qty: 90 TABLET | Refills: 1 | Status: SHIPPED | OUTPATIENT
Start: 2024-06-24

## 2024-06-24 NOTE — TELEPHONE ENCOUNTER
Rx Refill Note  Requested Prescriptions     Pending Prescriptions Disp Refills    atorvastatin (LIPITOR) 40 MG tablet [Pharmacy Med Name: ATORVASTATIN CALCIUM 40MG TABS] 90 tablet 1     Sig: Take 1 tablet by mouth Every Night.      Last office visit with prescribing clinician: 12/27/2023   Last telemedicine visit with prescribing clinician: Visit date not found   Next office visit with prescribing clinician: 6/27/2024                         Would you like a call back once the refill request has been completed: [] Yes [] No    If the office needs to give you a call back, can they leave a voicemail: [] Yes [] No    Maria Luz Collazo MA  06/24/24, 09:40 EDT

## 2024-06-27 ENCOUNTER — HOSPITAL ENCOUNTER (OUTPATIENT)
Dept: CARDIOLOGY | Facility: HOSPITAL | Age: 74
Discharge: HOME | End: 2024-06-27
Admitting: INTERNAL MEDICINE
Payer: MEDICARE

## 2024-06-27 ENCOUNTER — OFFICE VISIT (OUTPATIENT)
Dept: CARDIOLOGY | Facility: CLINIC | Age: 74
End: 2024-06-27
Payer: MEDICARE

## 2024-06-27 VITALS
DIASTOLIC BLOOD PRESSURE: 59 MMHG | BODY MASS INDEX: 53.37 KG/M2 | WEIGHT: 290 LBS | SYSTOLIC BLOOD PRESSURE: 115 MMHG | HEIGHT: 62 IN

## 2024-06-27 VITALS
BODY MASS INDEX: 52.81 KG/M2 | SYSTOLIC BLOOD PRESSURE: 116 MMHG | HEIGHT: 62 IN | WEIGHT: 287 LBS | DIASTOLIC BLOOD PRESSURE: 72 MMHG | OXYGEN SATURATION: 98 % | HEART RATE: 83 BPM

## 2024-06-27 DIAGNOSIS — R60.0 EDEMA LEG: Primary | ICD-10-CM

## 2024-06-27 DIAGNOSIS — E78.5 DYSLIPIDEMIA: ICD-10-CM

## 2024-06-27 DIAGNOSIS — I27.20 PULMONARY HYPERTENSION: Chronic | ICD-10-CM

## 2024-06-27 DIAGNOSIS — I10 ESSENTIAL HYPERTENSION: ICD-10-CM

## 2024-06-27 DIAGNOSIS — I25.10 CAD S/P PERCUTANEOUS CORONARY ANGIOPLASTY: ICD-10-CM

## 2024-06-27 DIAGNOSIS — R06.09 DYSPNEA ON EXERTION: ICD-10-CM

## 2024-06-27 DIAGNOSIS — I34.0 NONRHEUMATIC MITRAL VALVE REGURGITATION: ICD-10-CM

## 2024-06-27 DIAGNOSIS — Z79.01 LONG TERM (CURRENT) USE OF ANTICOAGULANTS: ICD-10-CM

## 2024-06-27 DIAGNOSIS — I34.0 SEVERE MITRAL REGURGITATION: ICD-10-CM

## 2024-06-27 DIAGNOSIS — Z98.61 CAD S/P PERCUTANEOUS CORONARY ANGIOPLASTY: ICD-10-CM

## 2024-06-27 LAB
BH CV ECHO LEFT VENTRICLE GLOBAL LONGITUDINAL STRAIN: 10.7 %
BH CV ECHO MEAS - ACS: 1.73 CM
BH CV ECHO MEAS - AO MAX PG: 5.6 MMHG
BH CV ECHO MEAS - AO MEAN PG: 3.3 MMHG
BH CV ECHO MEAS - AO ROOT DIAM: 3 CM
BH CV ECHO MEAS - AO V2 MAX: 118.3 CM/SEC
BH CV ECHO MEAS - AO V2 VTI: 23.6 CM
BH CV ECHO MEAS - AVA(I,D): 1.84 CM2
BH CV ECHO MEAS - EDV(CUBED): 170.9 ML
BH CV ECHO MEAS - EDV(MOD-SP4): 89.5 ML
BH CV ECHO MEAS - EF(MOD-BP): 50 %
BH CV ECHO MEAS - EF(MOD-SP4): 41.3 %
BH CV ECHO MEAS - ESV(CUBED): 90.8 ML
BH CV ECHO MEAS - ESV(MOD-SP4): 52.5 ML
BH CV ECHO MEAS - FS: 19 %
BH CV ECHO MEAS - IVS/LVPW: 0.82 CM
BH CV ECHO MEAS - IVSD: 0.76 CM
BH CV ECHO MEAS - LA DIMENSION: 4.2 CM
BH CV ECHO MEAS - LV MASS(C)D: 173.8 GRAMS
BH CV ECHO MEAS - LV MAX PG: 1.92 MMHG
BH CV ECHO MEAS - LV MEAN PG: 1.15 MMHG
BH CV ECHO MEAS - LV V1 MAX: 69.3 CM/SEC
BH CV ECHO MEAS - LV V1 VTI: 14.4 CM
BH CV ECHO MEAS - LVIDD: 5.5 CM
BH CV ECHO MEAS - LVIDS: 4.5 CM
BH CV ECHO MEAS - LVOT AREA: 3 CM2
BH CV ECHO MEAS - LVOT DIAM: 1.96 CM
BH CV ECHO MEAS - LVPWD: 0.93 CM
BH CV ECHO MEAS - MR MAX PG: 91.9 MMHG
BH CV ECHO MEAS - MR MAX VEL: 479.3 CM/SEC
BH CV ECHO MEAS - MV A MAX VEL: 94 CM/SEC
BH CV ECHO MEAS - MV DEC SLOPE: 501.3 CM/SEC2
BH CV ECHO MEAS - MV DEC TIME: 0.14 SEC
BH CV ECHO MEAS - MV E MAX VEL: 70.9 CM/SEC
BH CV ECHO MEAS - MV E/A: 0.75
BH CV ECHO MEAS - MV MAX PG: 8.3 MMHG
BH CV ECHO MEAS - MV MEAN PG: 3.2 MMHG
BH CV ECHO MEAS - MV V2 VTI: 25.8 CM
BH CV ECHO MEAS - MVA(VTI): 1.69 CM2
BH CV ECHO MEAS - PA ACC TIME: 0.06 SEC
BH CV ECHO MEAS - PA V2 MAX: 94 CM/SEC
BH CV ECHO MEAS - PI END-D VEL: 124.3 CM/SEC
BH CV ECHO MEAS - RAP SYSTOLE: 8 MMHG
BH CV ECHO MEAS - RV MAX PG: 2.37 MMHG
BH CV ECHO MEAS - RV V1 MAX: 77 CM/SEC
BH CV ECHO MEAS - RV V1 VTI: 14.4 CM
BH CV ECHO MEAS - RVDD: 3.2 CM
BH CV ECHO MEAS - RVSP: 36 MMHG
BH CV ECHO MEAS - SV(LVOT): 43.5 ML
BH CV ECHO MEAS - SV(MOD-SP4): 37 ML
BH CV ECHO MEAS - TR MAX PG: 28 MMHG
BH CV ECHO MEAS - TR MAX VEL: 264.3 CM/SEC

## 2024-06-27 PROCEDURE — 93356 MYOCRD STRAIN IMG SPCKL TRCK: CPT

## 2024-06-27 PROCEDURE — 93356 MYOCRD STRAIN IMG SPCKL TRCK: CPT | Performed by: INTERNAL MEDICINE

## 2024-06-27 PROCEDURE — 93306 TTE W/DOPPLER COMPLETE: CPT | Performed by: INTERNAL MEDICINE

## 2024-06-27 PROCEDURE — 93306 TTE W/DOPPLER COMPLETE: CPT

## 2024-06-27 NOTE — PROGRESS NOTES
Subjective:     Encounter Date:06/27/2024      Patient ID: Jeanna Parks is a 73 y.o. female.    Chief Complaint and history of present illness:     Follow-up for CAD, CHF, cardiomyopathy, hypertension, diabetes, dyslipidemia     History of Present Illness  :     Ms. Jeanna Parks has PMH of      CAD, cath 2/22/2021 severe proximal LAD disease, 5/26/2021 HADLEY to ostial LAD, cath 5/31/2023 revealing patent ostial LAD stent, moderate disease in ostium of ramus intermedius, normal LV systolic function and LVEDP  HFrEF, right and left heart cath 2/21/2021 revealing severe pulmonary hypertension, severe TR, MR-  Moderate to severe mitral regurgitation  Severe TR, severe pulmonary hypertension  Cardiomyopathy, combination of ischemic plus or minus valvular heart disease  Hypertension  Dyslipidemia  diabetes   Obesity with BMI over 70  DVT  PE 2/25/2021  Gout, sciatica  No history of any surgeries  Positive family history of premature heart disease in multiple sisters and mother.  Former smoker quit this year        Is here for    follow-up.  Patient denies any chest pain.  Patient has stopped taking amlodipine because it is causing swelling.     Patient's arterial blood pressure is 116/72, heart rate 83, O2 sat of 98% on room air..  BMI is over 50.     Data:  Cardiac cath 2/22/2021 which revealed subtotal occlusion of proximal LAD  5/26/2021 HADLEY to ostial LAD with Impella backup  Echocardiogram 2/22/2021 reviewed revealing LV dysfunction with EF of 35-40% and diastolic dysfunction with RV enlargement and severe pulmonary hypertension and moderate to severe mitral regurgitation and biatrial enlargement.  Echocardiogram 6/27/2024 revealed normal LV systolic function, LVEF of 50%.  Mild MR.  Calculated RV systolic pressure between 35 and 45 mm of medical    Reviewed detailed hospital records.  Patient was in the hospital 2/19/2021-3/2/2021 with shortness of breath was diagnosed with acute PE, severe MR, severe  TR, pulmonary hypertension, cor pulmonale, CAD.  Review of records reveal labs from 12/31/2020 cholesterol 137 triglycerides 129, HDL 46, LDL 68.  Hemoglobin A1c 6/30/2019 was 6.4 is improved to 5.4 on 12/30/20.  Normal CMP 12/30/2019 except for elevated glucose at 130.      Labs from 3/15/2022 reveal INR of 2.2.  Labs from 4/17/2023 revealed BUN/creatinine of 47/1.84.  GFR 29.  Lipid profile with cholesterol 97, triglycerides 72, HDL 42, LDL 40.  Labs from 5/30/2023 revealed a creatinine of 1.27 GFR 45.  Labs from 5/10/2023 revealed normal proBNP of 400.  INR 6/11/2023 was 2.2.  INR 12/12/2023 was 2.10.        Assessment ;        Edema  Valvular heart disease with severe MR and TR  CAD, PCI  Ischemic cardiomyopathy EF of 35 to 40%  Valvular heart disease with severe MR and severe TR  Severe pulmonary hypertension  Hypertension  Dyslipidemia    CKD  Morbid obesity with BMI over 50     Recommendations and plan     Will discontinue amlodipine since it is causing edema.  Monitor blood pressure.  Continue medical management with clopidogrel, atorvastatin, metoprolol succinate, warfarin   to help with CAD, CHF, cardiomyopathy, valvular heart disease, pulmonary hypertension as tolerated.  Monitor renal function and electrolytes  Reviewed diabetes with patient, patient says she does not have diabetes, was on metformin which was causing diarrhea and PMD stopped metformin.    I reviewed labs with patient from 6/30/2019 with A1c of 6.4.  We will continue atorvastatin for dyslipidemia.  We will continue warfarin for DVT PE, patient was seen by hematology Dr. Falcon and was advised to be on lifelong anticoagulation for unprovoked DVT PE.  Reviewed BMI over 50,, counseled on weight loss diet and exercise.        Procedures      Lexiscan Cardiolite performed 5/11/2023 reveals large anterior and apical MI with eri-infarct ischemia EF of 41%     Procedures    Echocardiogram 6/27/2024 revealed EF of 50% with mild MR, calculated RV  systolic pressure of 35 to 45 mmHg  Copied text in this portion of the note has been reviewed and is accurate as of 7/2/2024  The following portions of the patient's history were reviewed and updated as appropriate: allergies, current medications, past family history, past medical history, past social history, past surgical history and problem list.    Assessment:         [facility]       Diagnosis Plan   1. Edema leg        2. Nonrheumatic mitral valve regurgitation        3. Pulmonary hypertension        4. Essential hypertension        5. Dyslipidemia        6. CAD S/P percutaneous coronary angioplasty        7. Long term (current) use of anticoagulants               Plan:               Past Medical History:  Past Medical History:   Diagnosis Date    Anticoagulant long-term use     WARFARIN, PLAVIX    Arthritis     Congestive heart failure 12/30/2020    COPD (chronic obstructive pulmonary disease)     Coronary artery disease     Gout     Hypertension 09/03/2015    Iron deficiency associated with familial restless legs syndrome     PE (pulmonary thromboembolism)     Preoperative cardiovascular examination 05/31/2023    Pulmonary hypertension     Sciatica      Past Surgical History:  Past Surgical History:   Procedure Laterality Date    CARDIAC CATHETERIZATION N/A 2/22/2021    Procedure: Cardiac Catheterization/Vascular Study;  Surgeon: Kit Jacques MD;  Location: [facility] CATH INVASIVE LOCATION;  Service: Cardiovascular;  Laterality: N/A;    CARDIAC CATHETERIZATION N/A 5/26/2021    Procedure: Left Heart Cath,impella assisted PCI;  Surgeon: Itz Raymundo MD;  Location: [facility] CATH INVASIVE LOCATION;  Service: Cardiovascular;  Laterality: N/A;    CARDIAC CATHETERIZATION N/A 5/26/2021    Procedure: Stent HADLEY coronary;  Surgeon: Itz Raymundo MD;  Location: [facility] CATH INVASIVE LOCATION;  Service: Cardiovascular;  Laterality: N/A;    CARDIAC CATHETERIZATION N/A 5/31/2023    Procedure:  Left Heart Cath;  Surgeon: Itz Raymundo MD;  Location: [facility] CATH INVASIVE LOCATION;  Service: Cardiovascular;  Laterality: N/A;    COLONOSCOPY N/A 5/29/2024    Procedure: COLONOSCOPY WITH POLYPECTOMY X7;  Surgeon: Dawood Boyce MD;  Location: [facility] ENDOSCOPY;  Service: Gastroenterology;  Laterality: N/A;  POST: DIVERTICULOSIS, LIPOMA IN ASCENDING, POLYPS, INTERNAL HEMORRHOIDS    CORONARY ANGIOPLASTY  05/26/2021    1x stent to LAD      Allergies:  No Known Allergies  Home Meds:  Current Meds:     Current Outpatient Medications:     albuterol sulfate  (90 Base) MCG/ACT inhaler, Inhale 2 puffs Every 4 (Four) Hours As Needed for Wheezing., Disp: 18 g, Rfl: 5    allopurinol (ZYLOPRIM) 100 MG tablet, TAKE 1 TABLET DAILY (Patient taking differently: Take 1 tablet by mouth Daily.), Disp: 90 tablet, Rfl: 0    atorvastatin (LIPITOR) 40 MG tablet, Take 1 tablet by mouth Every Night., Disp: 90 tablet, Rfl: 1    budesonide-formoterol (Symbicort) 80-4.5 MCG/ACT inhaler, Inhale 2 puffs 2 (Two) Times a Day., Disp: 10.2 g, Rfl: 5    clopidogrel (PLAVIX) 75 MG tablet, Take 1 tablet by mouth Daily., Disp: 90 tablet, Rfl: 3    metoprolol succinate XL (TOPROL-XL) 25 MG 24 hr tablet, TAKE 1 TABLET DAILY (Patient taking differently: Take 1 tablet by mouth Daily.), Disp: 90 tablet, Rfl: 1    mometasone (Elocon) 0.1 % cream, Apply 1 application  topically to the appropriate area as directed Daily., Disp: 15 g, Rfl: 0    warfarin (Jantoven) 5 MG tablet, Take 7.5mg on M,W,F and 5mg all other days., Disp: 109 tablet, Rfl: 0  Social History:   Social History     Tobacco Use    Smoking status: Former     Current packs/day: 0.00     Types: Cigarettes     Quit date: 12/1/2020     Years since quitting: 3.5     Passive exposure: Past    Smokeless tobacco: Never    Tobacco comments:     quit a month ago   Substance Use Topics    Alcohol use: No      Family History:  Family History   Problem Relation Age of Onset    Heart  "disease Mother     Heart failure Mother     Heart disease Sister     Heart failure Sister     Heart disease Sister     Heart failure Sister               Review of Systems   Cardiovascular:  Positive for leg swelling. Negative for chest pain and palpitations.   Respiratory:  Negative for shortness of breath.    Neurological:  Negative for dizziness and numbness.     All other systems are negative         Objective:     Physical Exam  /72 (BP Location: Left arm, Patient Position: Sitting, Cuff Size: Large Adult)   Pulse 83   Ht 157.5 cm (62\")   Wt 130 kg (287 lb)   LMP  (LMP Unknown)   SpO2 98%   BMI 52.49 kg/m²   General:  Appears in no acute distress  Eyes: Sclera is anicteric,  conjunctiva is clear   HEENT:  No JVD.  No carotid bruits  Respiratory: Respirations regular and unlabored at rest.  Clear to auscultation  Cardiovascular: S1,S2 Regular rate and rhythm. No murmur, rub or gallop auscultated.   Extremities: No digital clubbing or cyanosis, no edema  Skin: Color pink. Skin warm and dry to touch. No rashes  No xanthoma  Neuro: Alert and awake.    Lab Reviewed:         Itz Raymundo MD  7/2/2024 09:56 EDT      EMR Dragon/Transcription:   \"Dictated utilizing Dragon dictation\".        "

## 2024-08-12 RX ORDER — WARFARIN SODIUM 5 MG/1
TABLET ORAL
Qty: 109 TABLET | Refills: 0 | OUTPATIENT
Start: 2024-08-12

## 2024-09-24 RX ORDER — AMLODIPINE BESYLATE 5 MG/1
5 TABLET ORAL DAILY
Qty: 90 TABLET | Refills: 3 | Status: SHIPPED | OUTPATIENT
Start: 2024-09-24

## 2024-09-25 RX ORDER — METOPROLOL SUCCINATE 25 MG/1
25 TABLET, EXTENDED RELEASE ORAL DAILY
Qty: 90 TABLET | Refills: 3 | Status: SHIPPED | OUTPATIENT
Start: 2024-09-25

## 2024-11-15 RX ORDER — CLOPIDOGREL BISULFATE 75 MG/1
75 TABLET ORAL DAILY
Qty: 90 TABLET | Refills: 1 | Status: SHIPPED | OUTPATIENT
Start: 2024-11-15

## 2024-11-15 NOTE — TELEPHONE ENCOUNTER
Rx Refill Note  Requested Prescriptions     Pending Prescriptions Disp Refills    clopidogrel (PLAVIX) 75 MG tablet [Pharmacy Med Name: Clopidogrel Bisulfate 75 Mg Tablet] 90 tablet 2     Sig: TAKE 1 TABLET DAILY      Last office visit with prescribing clinician: 6/27/2024   Last telemedicine visit with prescribing clinician: Visit date not found   Next office visit with prescribing clinician: 7/3/2025                         Would you like a call back once the refill request has been completed: [] Yes [] No    If the office needs to give you a call back, can they leave a voicemail: [] Yes [] No    Socorro Huff MA  11/15/24, 08:19 EST

## 2024-12-11 RX ORDER — ATORVASTATIN CALCIUM 40 MG/1
40 TABLET, FILM COATED ORAL
Qty: 90 TABLET | Refills: 1 | Status: SHIPPED | OUTPATIENT
Start: 2024-12-11

## 2024-12-11 NOTE — TELEPHONE ENCOUNTER
Rx Refill Note  Requested Prescriptions     Pending Prescriptions Disp Refills    atorvastatin (LIPITOR) 40 MG tablet [Pharmacy Med Name: ATORVASTATIN CALCIUM 40MG TABS] 90 tablet 1     Sig: TAKE ONE TABLET BY MOUTH EVERY NIGHT      Last office visit with prescribing clinician: 6/27/2024   Last telemedicine visit with prescribing clinician: Visit date not found   Next office visit with prescribing clinician: 7/3/2025                         Would you like a call back once the refill request has been completed: [] Yes [] No    If the office needs to give you a call back, can they leave a voicemail: [] Yes [] No    Socorro Huff MA  12/11/24, 08:47 EST

## 2025-04-24 RX ORDER — CLOPIDOGREL BISULFATE 75 MG/1
75 TABLET ORAL DAILY
Qty: 90 TABLET | Refills: 0 | Status: SHIPPED | OUTPATIENT
Start: 2025-04-24

## 2025-04-24 NOTE — TELEPHONE ENCOUNTER
Rx Refill Note  Requested Prescriptions     Pending Prescriptions Disp Refills    clopidogrel (PLAVIX) 75 MG tablet [Pharmacy Med Name: Clopidogrel Bisulfate 75 Mg Tablet] 90 tablet 1     Sig: TAKE ONE TABLET BY MOUTH EVERY DAY      Last office visit with prescribing clinician: 6/27/2024   Last telemedicine visit with prescribing clinician: Visit date not found   Next office visit with prescribing clinician: 7/3/2025                         Would you like a call back once the refill request has been completed: [] Yes [] No    If the office needs to give you a call back, can they leave a voicemail: [] Yes [] No    Socorro Huff MA  04/24/25, 08:48 EDT

## 2025-05-05 ENCOUNTER — TELEPHONE (OUTPATIENT)
Dept: CARDIOLOGY | Facility: CLINIC | Age: 75
End: 2025-05-05
Payer: MEDICARE

## 2025-05-05 NOTE — TELEPHONE ENCOUNTER
Caller: Jeanna Brown    Relationship: Self    Best call back number:       PATIENT WAS SEEN BY PCP LAST WEEK, WAS TOLD TO MAKE APPT WITH CARDIOLOGY AS SHE IS HAVING BREATHING PROBLEMS.  SOB ON EXERTION.  NOT AT THE TIME OF THE CALL.

## 2025-05-08 ENCOUNTER — TELEPHONE (OUTPATIENT)
Dept: CARDIOLOGY | Facility: CLINIC | Age: 75
End: 2025-05-08
Payer: MEDICARE

## 2025-05-08 DIAGNOSIS — I25.118 CORONARY ARTERY DISEASE OF NATIVE ARTERY OF NATIVE HEART WITH STABLE ANGINA PECTORIS: Chronic | ICD-10-CM

## 2025-05-08 DIAGNOSIS — I10 ESSENTIAL HYPERTENSION: Primary | Chronic | ICD-10-CM

## 2025-05-08 DIAGNOSIS — I50.42 CHRONIC COMBINED SYSTOLIC AND DIASTOLIC CONGESTIVE HEART FAILURE: ICD-10-CM

## 2025-05-10 ENCOUNTER — LAB (OUTPATIENT)
Dept: LAB | Facility: HOSPITAL | Age: 75
End: 2025-05-10
Payer: MEDICARE

## 2025-05-10 DIAGNOSIS — I50.42 CHRONIC COMBINED SYSTOLIC AND DIASTOLIC CONGESTIVE HEART FAILURE: ICD-10-CM

## 2025-05-10 DIAGNOSIS — I25.118 CORONARY ARTERY DISEASE OF NATIVE ARTERY OF NATIVE HEART WITH STABLE ANGINA PECTORIS: Chronic | ICD-10-CM

## 2025-05-10 DIAGNOSIS — I10 ESSENTIAL HYPERTENSION: Chronic | ICD-10-CM

## 2025-05-10 LAB
ALBUMIN SERPL-MCNC: 3.8 G/DL (ref 3.5–5.2)
ALBUMIN/GLOB SERPL: 1.1 G/DL
ALP SERPL-CCNC: 131 U/L (ref 39–117)
ALT SERPL W P-5'-P-CCNC: 12 U/L (ref 1–33)
ANION GAP SERPL CALCULATED.3IONS-SCNC: 11.3 MMOL/L (ref 5–15)
AST SERPL-CCNC: 19 U/L (ref 1–32)
BILIRUB SERPL-MCNC: 0.3 MG/DL (ref 0–1.2)
BUN SERPL-MCNC: 17 MG/DL (ref 8–23)
BUN/CREAT SERPL: 13.8 (ref 7–25)
CALCIUM SPEC-SCNC: 9.5 MG/DL (ref 8.6–10.5)
CHLORIDE SERPL-SCNC: 109 MMOL/L (ref 98–107)
CHOLEST SERPL-MCNC: 135 MG/DL (ref 0–200)
CO2 SERPL-SCNC: 22.7 MMOL/L (ref 22–29)
CREAT SERPL-MCNC: 1.23 MG/DL (ref 0.57–1)
EGFRCR SERPLBLD CKD-EPI 2021: 46.5 ML/MIN/1.73
GLOBULIN UR ELPH-MCNC: 3.5 GM/DL
GLUCOSE SERPL-MCNC: 117 MG/DL (ref 65–99)
HDLC SERPL-MCNC: 53 MG/DL (ref 40–60)
LDLC SERPL CALC-MCNC: 49 MG/DL (ref 0–100)
LDLC/HDLC SERPL: 0.76 {RATIO}
POTASSIUM SERPL-SCNC: 4.2 MMOL/L (ref 3.5–5.2)
PROT SERPL-MCNC: 7.3 G/DL (ref 6–8.5)
SODIUM SERPL-SCNC: 143 MMOL/L (ref 136–145)
TRIGL SERPL-MCNC: 209 MG/DL (ref 0–150)
VLDLC SERPL-MCNC: 33 MG/DL (ref 5–40)

## 2025-05-10 PROCEDURE — 80053 COMPREHEN METABOLIC PANEL: CPT

## 2025-05-10 PROCEDURE — 36415 COLL VENOUS BLD VENIPUNCTURE: CPT

## 2025-05-10 PROCEDURE — 80061 LIPID PANEL: CPT

## 2025-05-13 ENCOUNTER — OFFICE VISIT (OUTPATIENT)
Dept: CARDIOLOGY | Facility: CLINIC | Age: 75
End: 2025-05-13
Payer: MEDICARE

## 2025-05-13 VITALS — BODY MASS INDEX: 53.92 KG/M2 | HEART RATE: 76 BPM | WEIGHT: 293 LBS | OXYGEN SATURATION: 96 % | HEIGHT: 62 IN

## 2025-05-13 DIAGNOSIS — I25.118 CORONARY ARTERY DISEASE OF NATIVE ARTERY OF NATIVE HEART WITH STABLE ANGINA PECTORIS: ICD-10-CM

## 2025-05-13 DIAGNOSIS — I27.20 PULMONARY HYPERTENSION: ICD-10-CM

## 2025-05-13 DIAGNOSIS — N18.31 STAGE 3A CHRONIC KIDNEY DISEASE: ICD-10-CM

## 2025-05-13 DIAGNOSIS — R06.09 DYSPNEA ON EXERTION: ICD-10-CM

## 2025-05-13 DIAGNOSIS — I10 ESSENTIAL HYPERTENSION: ICD-10-CM

## 2025-05-13 DIAGNOSIS — Z79.01 LONG TERM (CURRENT) USE OF ANTICOAGULANTS: ICD-10-CM

## 2025-05-13 DIAGNOSIS — E78.5 DYSLIPIDEMIA: ICD-10-CM

## 2025-05-13 DIAGNOSIS — I50.42 CHRONIC COMBINED SYSTOLIC AND DIASTOLIC CONGESTIVE HEART FAILURE: Primary | ICD-10-CM

## 2025-05-13 RX ORDER — FERROUS SULFATE 324(65)MG
324 TABLET, DELAYED RELEASE (ENTERIC COATED) ORAL 3 TIMES WEEKLY
COMMUNITY

## 2025-05-13 RX ORDER — FUROSEMIDE 40 MG/1
40 TABLET ORAL DAILY
COMMUNITY
End: 2025-05-13 | Stop reason: SDUPTHER

## 2025-05-13 RX ORDER — FUROSEMIDE 40 MG/1
40 TABLET ORAL DAILY
Qty: 90 TABLET | Refills: 3 | Status: SHIPPED | OUTPATIENT
Start: 2025-05-13

## 2025-05-13 NOTE — PROGRESS NOTES
Subjective:     Encounter Date:05/13/2025      Patient ID: Jeanna Parks is a 74 y.o. female.    Chief Complaint and history of present illness:     Follow-up for CAD, CHF, right heart failure, severe pulmonary hypertension and mitral regurgitation, cardiomyopathy, hypertension, diabetes, dyslipidemia     History of Present Illness  :     Ms. Jeanna Parks has PMH of      CAD, cath 2/22/2021 severe proximal LAD disease, 5/26/2021 HADLEY to ostial LAD, cath 5/31/2023 revealing patent ostial LAD stent, moderate disease in ostium of ramus intermedius, normal LV systolic function and LVEDP  HFrEF, right and left heart cath 2/21/2021 revealing severe pulmonary hypertension, severe TR, MR-  Moderate to severe mitral regurgitation  Severe TR, severe pulmonary hypertension  Cardiomyopathy, combination of ischemic plus or minus valvular heart disease  Hypertension  Dyslipidemia  diabetes   Obesity with BMI over 70  DVT  PE 2/25/2021  Gout, sciatica  No history of any surgeries  Positive family history of premature heart disease in multiple sisters and mother.  Former smoker quit this year        Is here for    follow-up.  Patient had weight gain and shortness of breath and edema.  Saw PMD was started on diuretics.  Patient has renal dysfunction.     Patient's arterial blood pressure is 143/71, heart rate 76 beats per, O2 sat of 96% on room air..  BMI is over 50.     Data:  Cardiac cath 2/22/2021 which revealed subtotal occlusion of proximal LAD  5/26/2021 HADLEY to ostial LAD with Impella backup  Echocardiogram 2/22/2021 reviewed revealing LV dysfunction with EF of 35-40% and diastolic dysfunction with RV enlargement and severe pulmonary hypertension and moderate to severe mitral regurgitation and biatrial enlargement.  Echocardiogram 6/27/2024 revealed normal LV systolic function, LVEF of 50%.  Mild MR.  Calculated RV systolic pressure between 35 and 45 mm of medical     Reviewed detailed hospital records.   Patient was in the hospital 2/19/2021-3/2/2021 with shortness of breath was diagnosed with acute PE, severe MR, severe TR, pulmonary hypertension, cor pulmonale, CAD.  Review of records reveal labs from 12/31/2020 cholesterol 137 triglycerides 129, HDL 46, LDL 68.  Hemoglobin A1c 6/30/2019 was 6.4 is improved to 5.4 on 12/30/20.  Normal CMP 12/30/2019 except for elevated glucose at 130.       Labs from 3/15/2022 reveal INR of 2.2.  Labs from 4/17/2023 revealed BUN/creatinine of 47/1.84.  GFR 29.  Lipid profile with cholesterol 97, triglycerides 72, HDL 42, LDL 40.  Labs from 5/30/2023 revealed a creatinine of 1.27 GFR 45.  Labs from 5/10/2023 revealed normal proBNP of 400.  INR 6/11/2023 was 2.2.  INR 12/12/2023 was 2.10.        Assessment ;        Edema  Shortness of breath  Combined systolic and diastolic congestive heart failure due to valvular heart disease, right heart failure  Valvular heart disease with severe MR and TR  CAD, PCI  Ischemic cardiomyopathy EF of 35 to 40%  Valvular heart disease with severe MR and severe TR  Severe pulmonary hypertension  Hypertension  Dyslipidemia  Diabetes with hemoglobin A1c of 6.4 on 6/30/2019  CKD  Morbid obesity with BMI over 50     Recommendations and plan     Start Lasix  Recheck labs  Schedule an echo to assess valvular heart disease  Check BNP level  Will follow-up after echo and consider right and left heart cath.  Reviewed renal dysfunction.  Send her to nephrologist  Continue medical management with clopidogrel, Eliquis, metoprolol, atorvastatin and furosemide as tolerated.  Will hold off on ACE inhibitors, ARB, Arni, Aldactone due to renal dysfunction.  Follow-up with PMD for diabetes.  Continue atorvastatin for dyslipidemia  Patient is on Eliquis for unprovoked DVT PE.  Reviewed BMI over 50, counseled on weight loss diet and exercise.  Reviewed valvular heart disease and EKG results with patient     Procedures      Lexiscan Cardiolite performed 5/11/2023 reveals  large anterior and apical MI with eri-infarct ischemia EF of 41%     Procedures     Echocardiogram 6/27/2024 revealed EF of 50% with mild MR, calculated RV systolic pressure of 35 to 45 mmHg  Copied text in this portion of the note has been reviewed and is accurate as of 7/2/2024  The following portions of the patient's history were reviewed and updated as appropriate: allergies, current medications, past family history, past medical history, past social history, past surgical history and problem list.           ECG 12 Lead    Date/Time: 5/13/2025 10:49 AM  Performed by: Itz Raymundo MD    Authorized by: Itz Raymundo MD  Comparison: compared with previous ECG from 12/27/2023  Comparison to previous ECG: EKG done today reviewed/interpreted by me reveals sinus at the rate of 89 bpm, no significant change compared to EKG from 12/27/2023          ECHOCARDIOGRAM:  Results for orders placed during the hospital encounter of 06/27/24    Adult Transthoracic Echo Complete W/ Cont if Necessary Per Protocol    Interpretation Summary    Left ventricular systolic function is normal. Calculated left ventricular EF = 50%    Left ventricular diastolic function was normal.    Estimated right ventricular systolic pressure from tricuspid regurgitation is mildly elevated (35-45 mmHg).    Conclusion      Normal LV size with possible apical hypokinesis.  Rest of the LV is chato well.  Estimated LV ejection fraction of 50%  Normal RV size  Normal atrial size  Pulmonic valve is not well visualized.  Aortic valve, mitral valve, tricuspid valve appears structurally normal, mild mitral regurgitation seen.  No pericardial effusion seen.  Proximal aorta appears normal in size.      STRESS TEST  Results for orders placed during the hospital encounter of 05/11/23    Stress Test With Myocardial Perfusion One Day    Interpretation Summary  LEXISCAN CARDIOLITE REPORT    DATE OF PROCEDURE:  05/11/23    INDICATION FOR  PROCEDURE: Dyspnea on exertion, preoperative cardiovascular evaluation, CHF, cardiomyopathy, hypertension, obesity, CAD, PCI    PROCEDURE PERFORMED: Lexiscan Cardiolite    PROCEDURE COMMENTS:    After informed consent was obtained.  Patient's resting heart rate was 78 bpm, resting blood pressure was 144/76, resting EKG revealed sinus rhythm with rate of 78 bpm with Q waves in V1 V2.  Patient was given 0.4 mg of regadenosine for stress testing.  There was no significant change in heart rate, blood pressure, symptoms with regadenoson injection.  Patient tolerated procedure well.  Complications were none.    NUCLEAR IMAGIN.  There was large severe predominantly fixed defect involving whole of apex consistent with MI with eri-infarct ischemia..  2.  Gated images reveal apical akinesis, LVEF of 41%.    CONCLUSION:  1.  Lexiscan Cardiolite with abnormal perfusion, large apical MI with eri-infarct ischemia.  2.  Apical akinesis.  LVEF of 41%.    RECOMMENDATIONS:    Clinical correlation recommended.      Itz Raymundo MD  23  18:10 EDT          HEART CATHETERIZATION  Results for orders placed during the hospital encounter of 23    Cardiac Catheterization/Vascular Study    Conclusion  Table formatting from the original result was not included.  May 31, 2023      Heart Cath Report    NAME:              Jeanna Parks  :                1950  AGE/SEX:        72 y.o. female  MRN:                [id number]        Procedures Performed    Left heart catheterization  Selective coronary angiography  Left ventriculography  Mynx closure device    :   Itz Raymundo MD    Vascular Access Site: Femoral    Indication for procedure: Dyspnea on exertion consistent with angina, abnormal stress test, CAD, PCI, preop for knee replacement, CHF, cardiomyopathy, TR      Procedure Note    After discussing the risks, benefits, and alternatives of the procedure, informed consent was  obtained.  Timeout was done before the procedure.  Moderate conscious sedation was given utilizing IV Versed and fentanyl administered by RN with continuous EKG oximetry and hemodynamic monitoring supervised by me throughout the entire case, conscious sedation time was 30 minutes.  I was present with the patient for the duration of moderate sedation and supervised staff who had no other duties and monitored the patient for the entire procedure patient had Gonzalez 2-3 sedation scale. the vascular access site was prepped and draped in the usual sterile fashion.  2% lidocaine was used for local anesthesia. Appropriate landmarks were assessed.  A 6 Moldovan short sheath was inserted in the artery using the modified Seldinger technique.    Selective coronary angiography was performed with JL4 and JR4 diagnostic catheters. Left ventriculogram  was performed with JR4 catheter.  All exchanges were performed over the wire.  No specimens were removed.  There were no apparent acute or early complications.  The patient tolerated the procedure well and was transferred to the recovery area in stable condition.      Closure device: Mynx device was deployed successfully after right iliofemoral angiogram was performed. Good hemostasis was achieved.    Complications:  None  Blood Loss: minimal    Hemodynamics    Pressures    Ao:    104/57 mmHg  LV:    96/7 mmHg  End-diastolic pressure:  7  mmHg  No significant aortic valvular gradient on pullback    Coronary Angiography    Left Main :  The left main   has mild luminal irregularities 20% distally.  Trifurcates into the ramus LAD LCx.    Left Anterior Descending : Stent patent in the ostium of LAD with excellent long-term results.  Distal LAD near the apical segment has 40 to 50% diffuse luminal irregularities.    Ramus intermedius : 40 to 50% ostial narrowing.    Left Circumflex : Is codominant vessel without disease    Right Coronary Artery :  The right coronary artery   is codominant  vessel without disease    Dominance:  [x]  Left  []  Right  [x]  Co-Dominant    Left Ventriculography:    Estimated Ejection Fraction: 55 %  Wall motion abnormalities:  None  Mitral Regurgitation:  None    Impression:    Patent stent in ostial LAD  Moderate disease in ostium of ramus intermedius  Preserved LV systolic function and normal LVEDP    Recommendations:    Okay for knee replacement surgery from cardiovascular standpoint.        I sincerely appreciate the opportunity to participate in your patient's care. Please feel free to contact me anytime if I can be of assistance in this or any other way.      Pertinent History    Past Medical History:  Diagnosis Date   Arthritis   Congestive heart failure 12/30/2020   Gout   Hypertension 9/3/2015   Sciatica   Type 2 diabetes mellitus without complication, without long-term current use of insulin 9/3/2019  glucometer, test strips and lancets sent new onset start metformin DM education encouraged weight loss    Past Surgical History:  Procedure Laterality Date   CARDIAC CATHETERIZATION N/A 2/22/2021  Procedure: Cardiac Catheterization/Vascular Study;  Surgeon: Kit Jacques MD;  Location: [facility] CATH INVASIVE LOCATION;  Service: Cardiovascular;  Laterality: N/A;   CARDIAC CATHETERIZATION N/A 5/26/2021  Procedure: Left Heart Cath,impella assisted PCI;  Surgeon: Itz Raymundo MD;  Location:  [facility] CATH INVASIVE LOCATION;  Service: Cardiovascular;  Laterality: N/A;   CARDIAC CATHETERIZATION N/A 5/26/2021  Procedure: Stent HADLEY coronary;  Surgeon: Itz Raymundo MD;  Location: [facility] CATH INVASIVE LOCATION;  Service: Cardiovascular;  Laterality: N/A;   CORONARY ANGIOPLASTY  05/26/2021  1x stent to LAD    Prior to Admission medications  Medication Sig Start Date End Date Taking? Authorizing Provider  albuterol sulfate  (90 Base) MCG/ACT inhaler Inhale 2 puffs Every 4 (Four) Hours As Needed for Wheezing. 1/25/22  Yes Kenn  Latanya TOVAR MD  allopurinol (ZYLOPRIM) 100 MG tablet TAKE 1 TABLET DAILY 4/30/23  Yes Rene Hawk MD  amLODIPine (NORVASC) 5 MG tablet Take 1 tablet by mouth Daily. 5/11/23  Yes Itz Raymundo MD  atorvastatin (LIPITOR) 40 MG tablet TAKE 1 TABLET EVERY NIGHT 4/11/23  Yes Itz Raymundo MD  budesonide-formoterol (Symbicort) 80-4.5 MCG/ACT inhaler Inhale 2 puffs 2 (Two) Times a Day. 1/25/22  Yes Latanya Hawk MD  lisinopril (PRINIVIL,ZESTRIL) 2.5 MG tablet TAKE 1 TABLET DAILY 4/11/23  Yes Itz Raymundo MD  metFORMIN (GLUCOPHAGE) 500 MG tablet Take 1 tablet by mouth 2 (Two) Times a Day With Meals.  Patient taking differently: Take 1 tablet by mouth Daily. 6/23/22  Yes Latanya Hawk MD  metoprolol succinate XL (TOPROL-XL) 25 MG 24 hr tablet TAKE 1 TABLET DAILY 5/23/23  Yes Itz Raymundo MD  ticagrelor (BRILINTA) 90 MG tablet tablet Take 1 tablet by mouth 2 (Two) Times a Day. 7/21/22  Yes Itz Raymundo MD  warfarin (COUMADIN) 5 MG tablet TAKE 1 AND 1/2 TABLET BY MOUTH DAILY ON Monday AND FRIDAY AND TAKE ONE TABLET BY MOUTH DAILY ON TUESDAY, Wednesday,THURSDAY, SATURDAY AND SUNDAY 2/14/23  Yes Rene Hawk MD      Pre-Procedure Notes  H&P Performed  [x]  Yes []  No       []  N/A    Indications:  []  ACS <= 24 HRS  []  ACS >24 HRS  []  New Onset Angina <= 2 mos  []  Worsening Angina  []  Resuscitated Cardiac Arrest  []  Angina on Exertion:  []  Suspected CAD  []  Valvular Disease  []  Pericardial Disease  []  Cardiac Arrythmia  []  Cardiomyopathy  []  LV Dysfunction  []  Syncope  []  Post Cardiac Transplant  []  Eval. For Exercise Clearance  []  Other  []  Pre-Operative Evaluation  If Pre-Op Eval:  Evaluation for Surgery Type:  []  Cardiac Surgery   []  Non-Cardiac Surgery  Functional Capacity:  []  <4 METS  []  >=4 METS w/o symptoms  []  >= 4 METS with symptoms  []  Unknown  Surgical Risk:  []  Low  []   Intermediate  []  High Risk: Vascular  []  High Risk Non-Vascular    Risks, Benefits, & Complications Discussed:  [x]  Yes  []  No  []  N/A    Questions Answered:  [x]  Yes  []  No  []  N/A    Consent Obtained:  [x]  Yes  []  No  []  N/A    CHF: [x]  Yes  []  No  If Yes:  Newly Diagnosed?  []  Yes  [x]  No  If Yes:  HF Type:  []  Diastolic  [x]  Systolic  []  Unknown      Itz Raymundo MD  5/31/2023  14:36 EDT  Electronically signed by Itz Raymundo MD, 05/31/23, 2:36 PM EDT.      Copied text in this portion of the note has been reviewed and is accurate as of 5/13/2025  The following portions of the patient's history were reviewed and updated as appropriate: allergies, current medications, past family history, past medical history, past social history, past surgical history and problem list.    Assessment:         MDM       Diagnosis Plan   1. Chronic combined systolic and diastolic congestive heart failure  Adult Transthoracic Echo Complete W/ Cont if Necessary Per Protocol    BNP    Basic Metabolic Panel      2. Essential hypertension  Adult Transthoracic Echo Complete W/ Cont if Necessary Per Protocol    BNP    Basic Metabolic Panel      3. Coronary artery disease of native artery of native heart with stable angina pectoris  Adult Transthoracic Echo Complete W/ Cont if Necessary Per Protocol    BNP    Basic Metabolic Panel      4. Pulmonary hypertension  Adult Transthoracic Echo Complete W/ Cont if Necessary Per Protocol    BNP    Basic Metabolic Panel      5. Dyslipidemia  Adult Transthoracic Echo Complete W/ Cont if Necessary Per Protocol    BNP    Basic Metabolic Panel      6. Long term (current) use of anticoagulants  Adult Transthoracic Echo Complete W/ Cont if Necessary Per Protocol    BNP    Basic Metabolic Panel      7. Dyspnea on exertion               Plan:               Past Medical History:  Past Medical History:   Diagnosis Date    Anticoagulant long-term use     WARFARIN,  PLAVIX    Arthritis     Congestive heart failure 12/30/2020    COPD (chronic obstructive pulmonary disease)     Coronary artery disease     Gout     Hypertension 09/03/2015    Iron deficiency associated with familial restless legs syndrome     PE (pulmonary thromboembolism)     Preoperative cardiovascular examination 05/31/2023    Pulmonary hypertension     Sciatica      Past Surgical History:  Past Surgical History:   Procedure Laterality Date    CARDIAC CATHETERIZATION N/A 2/22/2021    Procedure: Cardiac Catheterization/Vascular Study;  Surgeon: Kit Jacques MD;  Location: [facility] CATH INVASIVE LOCATION;  Service: Cardiovascular;  Laterality: N/A;    CARDIAC CATHETERIZATION N/A 5/26/2021    Procedure: Left Heart Cath,impella assisted PCI;  Surgeon: Itz Raymundo MD;  Location: [facility] CATH INVASIVE LOCATION;  Service: Cardiovascular;  Laterality: N/A;    CARDIAC CATHETERIZATION N/A 5/26/2021    Procedure: Stent HADLEY coronary;  Surgeon: Itz Raymundo MD;  Location: [facility] CATH INVASIVE LOCATION;  Service: Cardiovascular;  Laterality: N/A;    CARDIAC CATHETERIZATION N/A 5/31/2023    Procedure: Left Heart Cath;  Surgeon: Itz Raymundo MD;  Location: [facility] CATH INVASIVE LOCATION;  Service: Cardiovascular;  Laterality: N/A;    COLONOSCOPY N/A 5/29/2024    Procedure: COLONOSCOPY WITH POLYPECTOMY X7;  Surgeon: Dawood Boyce MD;  Location: [facility] ENDOSCOPY;  Service: Gastroenterology;  Laterality: N/A;  POST: DIVERTICULOSIS, LIPOMA IN ASCENDING, POLYPS, INTERNAL HEMORRHOIDS    CORONARY ANGIOPLASTY  05/26/2021    1x stent to LAD      Allergies:  No Known Allergies  Home Meds:  Current Meds:     Current Outpatient Medications:     albuterol sulfate  (90 Base) MCG/ACT inhaler, Inhale 2 puffs Every 4 (Four) Hours As Needed for Wheezing., Disp: 18 g, Rfl: 5    allopurinol (ZYLOPRIM) 100 MG tablet, TAKE 1 TABLET DAILY (Patient taking differently: Take 1 tablet by mouth  "Daily.), Disp: 90 tablet, Rfl: 0    apixaban (ELIQUIS) 2.5 MG tablet tablet, Take 1 tablet by mouth 2 (Two) Times a Day., Disp: , Rfl:     atorvastatin (LIPITOR) 40 MG tablet, TAKE ONE TABLET BY MOUTH EVERY NIGHT, Disp: 90 tablet, Rfl: 1    clopidogrel (PLAVIX) 75 MG tablet, TAKE ONE TABLET BY MOUTH EVERY DAY, Disp: 90 tablet, Rfl: 0    ferrous sulfate 324 (65 Fe) MG tablet delayed-release EC tablet, Take 1 tablet by mouth 3 (Three) Times a Week., Disp: , Rfl:     furosemide (LASIX) 40 MG tablet, Take 1 tablet by mouth Daily., Disp: 90 tablet, Rfl: 3    metoprolol succinate XL (TOPROL-XL) 25 MG 24 hr tablet, TAKE ONE TABLET BY MOUTH EVERY DAY, Disp: 90 tablet, Rfl: 3    budesonide-formoterol (Symbicort) 80-4.5 MCG/ACT inhaler, Inhale 2 puffs 2 (Two) Times a Day., Disp: 10.2 g, Rfl: 5    mometasone (Elocon) 0.1 % cream, Apply 1 application  topically to the appropriate area as directed Daily., Disp: 15 g, Rfl: 0  Social History:   Social History     Tobacco Use    Smoking status: Former     Current packs/day: 0.00     Types: Cigarettes     Quit date: 2020     Years since quittin.4     Passive exposure: Past    Smokeless tobacco: Never    Tobacco comments:     quit a month ago   Substance Use Topics    Alcohol use: No      Family History:  Family History   Problem Relation Age of Onset    Heart disease Mother     Heart failure Mother     Heart disease Sister     Heart failure Sister     Heart disease Sister     Heart failure Sister               Review of Systems   Constitutional: Positive for malaise/fatigue.   Cardiovascular:  Negative for chest pain, leg swelling and palpitations.   Respiratory:  Positive for shortness of breath.    Skin:  Negative for rash.   Neurological:  Negative for dizziness, light-headedness and numbness.     All other systems are negative         Objective:     Physical Exam  Pulse 76   Ht 157.5 cm (62\")   Wt 136 kg (299 lb)   LMP  (LMP Unknown)   SpO2 96%   BMI 54.69 kg/m² " "  General:  Appears in no acute distress  Eyes: Sclera is anicteric,  conjunctiva is clear   HEENT:  No JVD.  No carotid bruits  Respiratory: Respirations regular and unlabored at rest.  Clear to auscultation  Cardiovascular: S1,S2 Regular rate and rhythm.  2 out of 6 holosystolic murmur.   Extremities: No digital clubbing or cyanosis, no edema  Skin: Color pink. Skin warm and dry to touch. No rashes  No xanthoma  Neuro: Alert and awake.    Lab Reviewed:         Itz Raymundo MD  5/13/2025 10:53 EDT      EMR Dragon/Transcription:   \"Dictated utilizing Dragon dictation\".        "

## 2025-05-19 ENCOUNTER — LAB (OUTPATIENT)
Dept: LAB | Facility: HOSPITAL | Age: 75
End: 2025-05-19
Payer: MEDICARE

## 2025-05-19 DIAGNOSIS — I27.20 PULMONARY HYPERTENSION: ICD-10-CM

## 2025-05-19 DIAGNOSIS — E78.5 DYSLIPIDEMIA: ICD-10-CM

## 2025-05-19 DIAGNOSIS — I50.42 CHRONIC COMBINED SYSTOLIC AND DIASTOLIC CONGESTIVE HEART FAILURE: ICD-10-CM

## 2025-05-19 DIAGNOSIS — Z79.01 LONG TERM (CURRENT) USE OF ANTICOAGULANTS: ICD-10-CM

## 2025-05-19 DIAGNOSIS — I25.118 CORONARY ARTERY DISEASE OF NATIVE ARTERY OF NATIVE HEART WITH STABLE ANGINA PECTORIS: ICD-10-CM

## 2025-05-19 DIAGNOSIS — I10 ESSENTIAL HYPERTENSION: ICD-10-CM

## 2025-05-19 LAB
ANION GAP SERPL CALCULATED.3IONS-SCNC: 12.9 MMOL/L (ref 5–15)
BUN SERPL-MCNC: 26 MG/DL (ref 8–23)
BUN/CREAT SERPL: 17.2 (ref 7–25)
CALCIUM SPEC-SCNC: 9.9 MG/DL (ref 8.6–10.5)
CHLORIDE SERPL-SCNC: 101 MMOL/L (ref 98–107)
CO2 SERPL-SCNC: 23.1 MMOL/L (ref 22–29)
CREAT SERPL-MCNC: 1.51 MG/DL (ref 0.57–1)
EGFRCR SERPLBLD CKD-EPI 2021: 36.4 ML/MIN/1.73
GLUCOSE SERPL-MCNC: 140 MG/DL (ref 65–99)
NT-PROBNP SERPL-MCNC: 312 PG/ML (ref 0–900)
POTASSIUM SERPL-SCNC: 4.1 MMOL/L (ref 3.5–5.2)
SODIUM SERPL-SCNC: 137 MMOL/L (ref 136–145)

## 2025-05-19 PROCEDURE — 36415 COLL VENOUS BLD VENIPUNCTURE: CPT

## 2025-05-19 PROCEDURE — 80048 BASIC METABOLIC PNL TOTAL CA: CPT

## 2025-05-19 PROCEDURE — 83880 ASSAY OF NATRIURETIC PEPTIDE: CPT

## 2025-05-20 ENCOUNTER — RESULTS FOLLOW-UP (OUTPATIENT)
Dept: CARDIOLOGY | Facility: CLINIC | Age: 75
End: 2025-05-20
Payer: MEDICARE

## 2025-05-20 NOTE — TELEPHONE ENCOUNTER
BNP normal    Kidney function has worsened.  Did she get into a kidney doctor?     Decrease furosemide to 20mg daily

## 2025-05-21 RX ORDER — FUROSEMIDE 20 MG/1
20 TABLET ORAL DAILY
Start: 2025-05-21

## 2025-05-27 RX ORDER — ATORVASTATIN CALCIUM 40 MG/1
40 TABLET, FILM COATED ORAL
Qty: 90 TABLET | Refills: 3 | Status: SHIPPED | OUTPATIENT
Start: 2025-05-27

## 2025-05-27 NOTE — TELEPHONE ENCOUNTER
Rx Refill Note  Requested Prescriptions     Pending Prescriptions Disp Refills    atorvastatin (LIPITOR) 40 MG tablet [Pharmacy Med Name: ATORVASTATIN CALCIUM 40MG TABS] 90 tablet 1     Sig: TAKE ONE TABLET BY MOUTH EVERY NIGHT      Last office visit with prescribing clinician: 5/13/2025   Last telemedicine visit with prescribing clinician: Visit date not found   Next office visit with prescribing clinician: 11/13/2025                         Would you like a call back once the refill request has been completed: [] Yes [] No    If the office needs to give you a call back, can they leave a voicemail: [] Yes [] No    Socorro Huff MA  05/27/25, 11:25 EDT

## 2025-05-30 ENCOUNTER — HOSPITAL ENCOUNTER (OUTPATIENT)
Dept: CARDIOLOGY | Facility: HOSPITAL | Age: 75
Discharge: HOME | End: 2025-05-30
Payer: MEDICARE

## 2025-05-30 VITALS
DIASTOLIC BLOOD PRESSURE: 80 MMHG | HEIGHT: 62 IN | BODY MASS INDEX: 53.92 KG/M2 | SYSTOLIC BLOOD PRESSURE: 152 MMHG | HEART RATE: 86 BPM | WEIGHT: 293 LBS

## 2025-05-30 DIAGNOSIS — I25.118 CORONARY ARTERY DISEASE OF NATIVE ARTERY OF NATIVE HEART WITH STABLE ANGINA PECTORIS: ICD-10-CM

## 2025-05-30 DIAGNOSIS — E78.5 DYSLIPIDEMIA: ICD-10-CM

## 2025-05-30 DIAGNOSIS — Z79.01 LONG TERM (CURRENT) USE OF ANTICOAGULANTS: ICD-10-CM

## 2025-05-30 DIAGNOSIS — I50.42 CHRONIC COMBINED SYSTOLIC AND DIASTOLIC CONGESTIVE HEART FAILURE: ICD-10-CM

## 2025-05-30 DIAGNOSIS — I10 ESSENTIAL HYPERTENSION: ICD-10-CM

## 2025-05-30 DIAGNOSIS — I27.20 PULMONARY HYPERTENSION: ICD-10-CM

## 2025-05-30 LAB
AORTIC DIMENSIONLESS INDEX: 0.56 (DI)
AV MEAN PRESS GRAD SYS DOP V1V2: 3.5 MMHG
AV VMAX SYS DOP: 116.3 CM/SEC
BH CV ECHO MEAS - AO MAX PG: 5.4 MMHG
BH CV ECHO MEAS - AO ROOT DIAM: 3.3 CM
BH CV ECHO MEAS - AO V2 VTI: 23.6 CM
BH CV ECHO MEAS - AVA(I,D): 1.86 CM2
BH CV ECHO MEAS - EDV(CUBED): 87.2 ML
BH CV ECHO MEAS - EDV(MOD-SP2): 59.8 ML
BH CV ECHO MEAS - EDV(MOD-SP4): 85.9 ML
BH CV ECHO MEAS - EF(MOD-SP2): 40.1 %
BH CV ECHO MEAS - EF(MOD-SP4): 49.3 %
BH CV ECHO MEAS - ESV(CUBED): 36.3 ML
BH CV ECHO MEAS - ESV(MOD-SP2): 35.9 ML
BH CV ECHO MEAS - ESV(MOD-SP4): 43.6 ML
BH CV ECHO MEAS - FS: 25.3 %
BH CV ECHO MEAS - IVS/LVPW: 1.02 CM
BH CV ECHO MEAS - IVSD: 1.03 CM
BH CV ECHO MEAS - LA DIMENSION: 4 CM
BH CV ECHO MEAS - LAT PEAK E' VEL: 6.2 CM/SEC
BH CV ECHO MEAS - LV MASS(C)D: 152.8 GRAMS
BH CV ECHO MEAS - LV MAX PG: 1.56 MMHG
BH CV ECHO MEAS - LV MEAN PG: 0.92 MMHG
BH CV ECHO MEAS - LV V1 MAX: 62.5 CM/SEC
BH CV ECHO MEAS - LV V1 VTI: 13.1 CM
BH CV ECHO MEAS - LVIDD: 4.4 CM
BH CV ECHO MEAS - LVIDS: 3.3 CM
BH CV ECHO MEAS - LVOT AREA: 3.3 CM2
BH CV ECHO MEAS - LVOT DIAM: 2.06 CM
BH CV ECHO MEAS - LVPWD: 1 CM
BH CV ECHO MEAS - MED PEAK E' VEL: 4.8 CM/SEC
BH CV ECHO MEAS - MR MAX PG: 98.3 MMHG
BH CV ECHO MEAS - MR MAX VEL: 495.7 CM/SEC
BH CV ECHO MEAS - MV A MAX VEL: 99.2 CM/SEC
BH CV ECHO MEAS - MV DEC SLOPE: 352.8 CM/SEC2
BH CV ECHO MEAS - MV DEC TIME: 0.17 SEC
BH CV ECHO MEAS - MV E MAX VEL: 61.7 CM/SEC
BH CV ECHO MEAS - MV E/A: 0.62
BH CV ECHO MEAS - MV MAX PG: 5.4 MMHG
BH CV ECHO MEAS - MV MEAN PG: 2.12 MMHG
BH CV ECHO MEAS - MV V2 VTI: 34.7 CM
BH CV ECHO MEAS - MVA(VTI): 1.26 CM2
BH CV ECHO MEAS - PA V2 MAX: 77.5 CM/SEC
BH CV ECHO MEAS - RAP SYSTOLE: 3 MMHG
BH CV ECHO MEAS - RV MAX PG: 1.56 MMHG
BH CV ECHO MEAS - RV V1 MAX: 62.4 CM/SEC
BH CV ECHO MEAS - RV V1 VTI: 11.4 CM
BH CV ECHO MEAS - RVSP: 26.6 MMHG
BH CV ECHO MEAS - SV(LVOT): 43.8 ML
BH CV ECHO MEAS - SV(MOD-SP2): 24 ML
BH CV ECHO MEAS - SV(MOD-SP4): 42.3 ML
BH CV ECHO MEAS - TAPSE (>1.6): 1.85 CM
BH CV ECHO MEAS - TR MAX PG: 23.6 MMHG
BH CV ECHO MEAS - TR MAX VEL: 242.9 CM/SEC
BH CV ECHO MEASUREMENTS AVERAGE E/E' RATIO: 11.22
LV EF BIPLANE MOD: 46 %

## 2025-05-30 PROCEDURE — 93306 TTE W/DOPPLER COMPLETE: CPT

## 2025-06-02 ENCOUNTER — OFFICE VISIT (OUTPATIENT)
Dept: CARDIOLOGY | Facility: CLINIC | Age: 75
End: 2025-06-02
Payer: MEDICARE

## 2025-06-02 VITALS
WEIGHT: 293 LBS | SYSTOLIC BLOOD PRESSURE: 112 MMHG | HEART RATE: 79 BPM | OXYGEN SATURATION: 97 % | DIASTOLIC BLOOD PRESSURE: 62 MMHG | HEIGHT: 62 IN | BODY MASS INDEX: 53.92 KG/M2

## 2025-06-02 DIAGNOSIS — I50.22 CHRONIC HEART FAILURE WITH MILDLY REDUCED EJECTION FRACTION (HFMREF, 41-49%): ICD-10-CM

## 2025-06-02 DIAGNOSIS — N18.30 STAGE 3 CHRONIC KIDNEY DISEASE, UNSPECIFIED WHETHER STAGE 3A OR 3B CKD: ICD-10-CM

## 2025-06-02 DIAGNOSIS — Z71.2 ENCOUNTER TO DISCUSS TEST RESULTS: Primary | ICD-10-CM

## 2025-06-02 DIAGNOSIS — Z79.01 LONG TERM (CURRENT) USE OF ANTICOAGULANTS: ICD-10-CM

## 2025-06-02 DIAGNOSIS — I51.9 DIASTOLIC DYSFUNCTION, LEFT VENTRICLE: ICD-10-CM

## 2025-06-02 DIAGNOSIS — I10 ESSENTIAL HYPERTENSION: Chronic | ICD-10-CM

## 2025-06-02 DIAGNOSIS — I42.0 DILATED CARDIOMYOPATHY: Chronic | ICD-10-CM

## 2025-06-02 DIAGNOSIS — E66.01 OBESITY, MORBID, BMI 50 OR HIGHER: Chronic | ICD-10-CM

## 2025-06-02 PROBLEM — I50.9 ACUTE EXACERBATION OF CHF (CONGESTIVE HEART FAILURE): Status: RESOLVED | Noted: 2020-12-30 | Resolved: 2025-06-02

## 2025-06-02 PROBLEM — I34.0 MILD MITRAL REGURGITATION: Status: ACTIVE | Noted: 2021-02-19

## 2025-06-02 PROBLEM — I50.9 CONGESTIVE HEART FAILURE: Status: RESOLVED | Noted: 2020-12-30 | Resolved: 2025-06-02

## 2025-06-02 PROCEDURE — 1159F MED LIST DOCD IN RCRD: CPT | Performed by: NURSE PRACTITIONER

## 2025-06-02 PROCEDURE — 3074F SYST BP LT 130 MM HG: CPT | Performed by: NURSE PRACTITIONER

## 2025-06-02 PROCEDURE — 3078F DIAST BP <80 MM HG: CPT | Performed by: NURSE PRACTITIONER

## 2025-06-02 PROCEDURE — 1160F RVW MEDS BY RX/DR IN RCRD: CPT | Performed by: NURSE PRACTITIONER

## 2025-06-02 PROCEDURE — 99214 OFFICE O/P EST MOD 30 MIN: CPT | Performed by: NURSE PRACTITIONER

## 2025-06-02 NOTE — PROGRESS NOTES
Subjective:     Encounter Date:06/02/2025      Patient ID: Jeanna Parks is a 74 y.o. female.    Chief Complaint: Discuss echo results     History of Present Illness      Ms. Jeanna Parks has PMH of      CAD, cath 2/22/2021 severe proximal LAD disease, 5/26/2021 HADLEY to ostial LAD, cath 5/31/2023 revealing patent ostial LAD stent, moderate disease in ostium of ramus intermedius, normal LV systolic function and LVEDP  HFrEF, right and left heart cath 2/21/2021 revealing severe pulmonary hypertension, severe TR, MR-  Moderate to severe mitral regurgitation  Severe TR, severe pulmonary hypertension  Cardiomyopathy, combination of ischemic plus or minus valvular heart disease  Hypertension  Dyslipidemia  diabetes   Obesity with BMI over 50  DVT  PE 2/25/2021  Gout, sciatica  No history of any surgeries  Positive family history of premature heart disease in multiple sisters and mother.  Former smoker quit this year    Here to discuss echocardiogram results.  Patient reports she had recent weight gain.  She was started on diuretics and echocardiogram was ordered.  Patient has now developed worsening renal dysfunction and we suggested decreasing lasix to 20mg daily.     Patient denies any further weight gain, but denies any significant weight loss since being on diuretic.  (Weight is up and down).  She does report feeling better being on diuretic.     Blood pressure in office today is 112/62  HR 81 oxygen 98% on room air weight 301 lbs BMI 54              Data:  Cardiac cath 2/22/2021 which revealed subtotal occlusion of proximal LAD  5/26/2021 HADLEY to ostial LAD with Impella backup  Echocardiogram 2/22/2021 reviewed revealing LV dysfunction with EF of 35-40% and diastolic dysfunction with RV enlargement and severe pulmonary hypertension and moderate to severe mitral regurgitation and biatrial enlargement.  Echocardiogram 6/27/2024 revealed normal LV systolic function, LVEF of 50%.  Mild MR.  Calculated RV  systolic pressure between 35 and 45 mm of mercury      Reviewed detailed hospital records.  Patient was in the hospital 2/19/2021-3/2/2021 with shortness of breath was diagnosed with acute PE, severe MR, severe TR, pulmonary hypertension, cor pulmonale, CAD    Echocardiogram 5/30/2025    Left ventricular ejection fraction appears to be 46 - 50%.    The left ventricular cavity is borderline dilated.    The findings are consistent with dilated cardiomyopathy.    Left ventricular diastolic function is consistent with (grade I) impaired relaxation.    Estimated right ventricular systolic pressure from tricuspid regurgitation is normal (<35 mmHg).        Lab Review:     5/10/2025: potassium 4.2 bun 17 creatinine 1.23 eGFR 46.5 glucose 117  HDL 53 LDL 49 trigs 209     5/19/2025: potassium 4.1 bun 26 creatinine 1.51 eGFR 36.4 glucose 140     The following portions of the patient's history were reviewed and updated as appropriate: allergies, current medications, past family history, past medical history, past social history, past surgical history, and problem list.        Review of Systems   Constitutional: Negative for malaise/fatigue.   Cardiovascular:  Negative for chest pain, dyspnea on exertion, leg swelling and syncope.   Respiratory:  Negative for cough and shortness of breath.    All other systems reviewed and are negative.        Past Medical History:   Diagnosis Date    Anticoagulant long-term use     WARFARIN, PLAVIX    Arthritis     Congestive heart failure 12/30/2020    COPD (chronic obstructive pulmonary disease)     Coronary artery disease     Gout     Hypertension 09/03/2015    Iron deficiency associated with familial restless legs syndrome     PE (pulmonary thromboembolism)     Preoperative cardiovascular examination 05/31/2023    Pulmonary hypertension     Sciatica      Past Surgical History:   Procedure Laterality Date    CARDIAC CATHETERIZATION N/A 2/22/2021    Procedure: Cardiac Catheterization/Vascular  "Study;  Surgeon: Kit Jacques MD;  Location: [facility] CATH INVASIVE LOCATION;  Service: Cardiovascular;  Laterality: N/A;    CARDIAC CATHETERIZATION N/A 5/26/2021    Procedure: Left Heart Cath,impella assisted PCI;  Surgeon: Itz Raymundo MD;  Location: [facility] CATH INVASIVE LOCATION;  Service: Cardiovascular;  Laterality: N/A;    CARDIAC CATHETERIZATION N/A 5/26/2021    Procedure: Stent HADLEY coronary;  Surgeon: Itz Raymundo MD;  Location: [facility] CATH INVASIVE LOCATION;  Service: Cardiovascular;  Laterality: N/A;    CARDIAC CATHETERIZATION N/A 5/31/2023    Procedure: Left Heart Cath;  Surgeon: Itz Raymundo MD;  Location: [facility] CATH INVASIVE LOCATION;  Service: Cardiovascular;  Laterality: N/A;    COLONOSCOPY N/A 5/29/2024    Procedure: COLONOSCOPY WITH POLYPECTOMY X7;  Surgeon: Dawood Boyce MD;  Location: [facility] ENDOSCOPY;  Service: Gastroenterology;  Laterality: N/A;  POST: DIVERTICULOSIS, LIPOMA IN ASCENDING, POLYPS, INTERNAL HEMORRHOIDS    CORONARY ANGIOPLASTY  05/26/2021    1x stent to LAD     /62 (BP Location: Left arm, Patient Position: Sitting, Cuff Size: Large Adult)   Pulse 79   Ht 157.5 cm (62\")   Wt 136 kg (299 lb)   LMP  (LMP Unknown)   SpO2 97%   BMI 54.69 kg/m²   Family History   Problem Relation Age of Onset    Heart disease Mother     Heart failure Mother     Heart disease Sister     Heart failure Sister     Heart disease Sister     Heart failure Sister        Current Outpatient Medications:     albuterol sulfate  (90 Base) MCG/ACT inhaler, Inhale 2 puffs Every 4 (Four) Hours As Needed for Wheezing., Disp: 18 g, Rfl: 5    allopurinol (ZYLOPRIM) 100 MG tablet, TAKE 1 TABLET DAILY (Patient taking differently: Take 1 tablet by mouth Daily.), Disp: 90 tablet, Rfl: 0    apixaban (ELIQUIS) 2.5 MG tablet tablet, Take 1 tablet by mouth 2 (Two) Times a Day., Disp: , Rfl:     atorvastatin (LIPITOR) 40 MG tablet, TAKE ONE TABLET BY MOUTH " EVERY NIGHT, Disp: 90 tablet, Rfl: 3    budesonide-formoterol (Symbicort) 80-4.5 MCG/ACT inhaler, Inhale 2 puffs 2 (Two) Times a Day., Disp: 10.2 g, Rfl: 5    clopidogrel (PLAVIX) 75 MG tablet, TAKE ONE TABLET BY MOUTH EVERY DAY, Disp: 90 tablet, Rfl: 0    ferrous sulfate 324 (65 Fe) MG tablet delayed-release EC tablet, Take 1 tablet by mouth 3 (Three) Times a Week., Disp: , Rfl:     furosemide (LASIX) 20 MG tablet, Take 1 tablet by mouth Daily., Disp: , Rfl:     metoprolol succinate XL (TOPROL-XL) 25 MG 24 hr tablet, TAKE ONE TABLET BY MOUTH EVERY DAY, Disp: 90 tablet, Rfl: 3    mometasone (Elocon) 0.1 % cream, Apply 1 application  topically to the appropriate area as directed Daily., Disp: 15 g, Rfl: 0  No Known Allergies  Social History     Socioeconomic History    Marital status:    Tobacco Use    Smoking status: Former     Current packs/day: 0.00     Types: Cigarettes     Quit date: 2020     Years since quittin.5     Passive exposure: Past    Smokeless tobacco: Never    Tobacco comments:     quit a month ago   Vaping Use    Vaping status: Never Used   Substance and Sexual Activity    Alcohol use: No    Drug use: No    Sexual activity: Defer                Objective:     Vitals reviewed.   Constitutional:       Appearance: Healthy appearance. Not in distress. Morbidly obese.   Neck:      Vascular: No JVR. JVD normal.   Pulmonary:      Effort: Pulmonary effort is normal.      Breath sounds: Normal breath sounds. No wheezing. No rhonchi. No rales.   Chest:      Chest wall: Not tender to palpatation.   Cardiovascular:      PMI at left midclavicular line. Normal rate. Regular rhythm. Normal S1. Normal S2.       Murmurs: There is no murmur.      No gallop.  No click. No rub.   Pulses:     Intact distal pulses.   Edema:     Peripheral edema absent.   Abdominal:      General: Bowel sounds are normal.      Palpations: Abdomen is soft.      Tenderness: There is no abdominal tenderness.   Musculoskeletal:  Normal range of motion.         General: No tenderness. Skin:     General: Skin is warm and dry.   Neurological:      General: No focal deficit present.      Mental Status: Alert and oriented to person, place and time.       Procedures                  Assessment:     [facility]       Diagnosis Plan   1. Encounter to discuss test results        2. Stage 3 chronic kidney disease, unspecified whether stage 3a or 3b CKD  Ambulatory Referral to Nephrology      3. Essential hypertension        4. Dilated cardiomyopathy        5. Chronic heart failure with mildly reduced ejection fraction (HFmrEF, 41-49%)        6. Long term (current) use of anticoagulants        7. Obesity, morbid, BMI 50 or higher        8. Diastolic dysfunction, left ventricle                       Plan:   Chart reviewed   Labs reviewed-- worsening renal function   Medications reviewed   Echocardiogram reviewed with patient  EF 46-50%, DD grade 1,  mild MR  --- previous echocardiogram with lower EF 35% severe MR, TR and pulmonary hypertension in the setting of PE.     Advised to continue monitoring weight, low sodium diet     Continue Eliquis for history of PE (recently switched from wafarin)     Continue plavix, statin, metoprolol, lasix   Not on ACE/ARB/ARNI/Spironolactone or SGLT-2 due to renal dysfunction and borderline blood pressure     Referral placed to nephrology to evaluate CKD       Advised patient to call for any issues   Otherwise follow up with Dr. Raymundo in 6 month     Electronically signed by RAJESH Olson, 06/02/25, 4:34 PM EDT.        This document is intended for medical expert use only.  Reading of this document by patients and/or patient's family without participating medical staff guidance may result in misinterpretation and unintended morbidity. Any interpretation of such data is the responsibility of the patient and/or family member responsible for the patient in concert with their primary or specialist providers, not to be left  for sources of online search as such as [employer], [employer] or similar queries.  Relying on these approaches to knowledge may result in misinterpretation, misguided goals of care and even death should patient or family members try recommendations outside of the realm of professional medical care in a supervised inpatient environment.

## 2025-07-14 RX ORDER — CLOPIDOGREL BISULFATE 75 MG/1
75 TABLET ORAL DAILY
Qty: 90 TABLET | Refills: 3 | Status: SHIPPED | OUTPATIENT
Start: 2025-07-14

## 2025-07-14 NOTE — TELEPHONE ENCOUNTER
Rx Refill Note  Requested Prescriptions     Pending Prescriptions Disp Refills    clopidogrel (PLAVIX) 75 MG tablet [Pharmacy Med Name: Clopidogrel Bisulfate 75 Mg Tablet] 90 tablet 0     Sig: TAKE ONE TABLET BY MOUTH EVERY DAY      Last office visit with prescribing clinician: 5/13/2025   Last telemedicine visit with prescribing clinician: Visit date not found   Next office visit with prescribing clinician: 12/18/2025                         Would you like a call back once the refill request has been completed: [] Yes [] No    If the office needs to give you a call back, can they leave a voicemail: [] Yes [] No    Socorro Huff MA  07/14/25, 15:43 EDT

## (undated) DEVICE — GW PTFE EMERALD HEPCOAT FC J TIP STD .035 3MM 150CM

## (undated) DEVICE — THE SUPERCROSS MICROCATHETER IS INTENDED TO BE USED IN CONJUNCTION WITH STEERABLE GUIDEWIRES TO ACCESS DISCRETE REGIONS OF THE CORONARY AND/OR PERIPHERAL VASCULATURE. IT MAY BE USED TO FACILITATE PLACEMENT AND EXCHANGE OF GUIDEWIRES AND OTHER INTERVENTIONAL DEVICES AND TO SUBSELECTIVELY INFUSE/DELIVER DIAGNOSTIC AND THERAPEUTIC AGENTS.: Brand: SUPERCROSS™ AT MICROCATHETER

## (undated) DEVICE — GW CHOICE PT J 300CM

## (undated) DEVICE — PINNACLE INTRODUCER SHEATH: Brand: PINNACLE

## (undated) DEVICE — PK TRY HEART CATH 50

## (undated) DEVICE — ST ACC MICROPUNCTURE STFF/CANN PLAT/TP 4F 21G 40CM

## (undated) DEVICE — CATH DIAG IMPULSE PIG 5F 100CM

## (undated) DEVICE — CATH DIAG IMPULSE FL4 5F 100CM

## (undated) DEVICE — ELECTRD DEFIB M/FUNC PROPADZ RADIOL 2PK

## (undated) DEVICE — GW DIAG EMERALD HEPCOAT MOVE JTIP STD .035 3MM 150CM

## (undated) DEVICE — TRAP WIDEEYE POLYP

## (undated) DEVICE — PK ENDO GI 50

## (undated) DEVICE — BALN EUPHORA 3X25MM

## (undated) DEVICE — MYNXGRIP 6F/7F: Brand: MYNXGRIP

## (undated) DEVICE — CATH DIAG IMPULSE FR4 6F 100CM

## (undated) DEVICE — DESTINATION PERIPHERAL GUIDING SHEATH: Brand: DESTINATION

## (undated) DEVICE — KT CATH CAD SUP IMPELLA/CP 9/14F 5L

## (undated) DEVICE — KT DYEVERT PLS EZ W/SMART SYR FOR HI VISC CONTRST DISP

## (undated) DEVICE — HI-TORQUE IRON MAN GUIDE WIRE .014 STRAIGHT TIP 3.0 CM X 300 CM: Brand: HI-TORQUE IRON MAN

## (undated) DEVICE — GC 7F 078 XB 3.5: Brand: VISTA BRITE TIP

## (undated) DEVICE — RADIFOCUS OBTURATOR: Brand: RADIFOCUS

## (undated) DEVICE — SKIN PREP TRAY W/CHG: Brand: MEDLINE INDUSTRIES, INC.

## (undated) DEVICE — Device

## (undated) DEVICE — CATH DIAG IMPULSE FL4 6F 100CM

## (undated) DEVICE — SWAN-GANZ TRUE SIZE THERMODILUTION "S" TIP: Brand: SWAN-GANZ TRUE SIZE

## (undated) DEVICE — GW RUNTHROUGH NS HYPERCOAT .014 3X180CM

## (undated) DEVICE — CATH DIAG IMPULSE FR4 5F 100CM

## (undated) DEVICE — SNAR POLYP HOTSNARE/BRAIDED OVL/MINI 7F 2.8X10MM 230CM 1P/U